# Patient Record
Sex: MALE | Race: WHITE | NOT HISPANIC OR LATINO | Employment: UNEMPLOYED
[De-identification: names, ages, dates, MRNs, and addresses within clinical notes are randomized per-mention and may not be internally consistent; named-entity substitution may affect disease eponyms.]

---

## 2017-06-10 ENCOUNTER — HEALTH MAINTENANCE LETTER (OUTPATIENT)
Age: 22
End: 2017-06-10

## 2017-09-18 ENCOUNTER — OFFICE VISIT (OUTPATIENT)
Dept: BEHAVIORAL HEALTH | Facility: OTHER | Age: 22
End: 2017-09-18
Attending: SOCIAL WORKER
Payer: COMMERCIAL

## 2017-09-18 ENCOUNTER — OFFICE VISIT (OUTPATIENT)
Dept: FAMILY MEDICINE | Facility: OTHER | Age: 22
End: 2017-09-18
Attending: NURSE PRACTITIONER
Payer: COMMERCIAL

## 2017-09-18 VITALS
SYSTOLIC BLOOD PRESSURE: 98 MMHG | BODY MASS INDEX: 18.32 KG/M2 | DIASTOLIC BLOOD PRESSURE: 60 MMHG | RESPIRATION RATE: 16 BRPM | HEART RATE: 71 BPM | WEIGHT: 128 LBS | HEIGHT: 70 IN | OXYGEN SATURATION: 98 % | TEMPERATURE: 98.5 F

## 2017-09-18 DIAGNOSIS — R69 DIAGNOSIS DEFERRED: Primary | ICD-10-CM

## 2017-09-18 DIAGNOSIS — F29 PSYCHOSIS, UNSPECIFIED PSYCHOSIS TYPE (H): Primary | ICD-10-CM

## 2017-09-18 PROCEDURE — 99212 OFFICE O/P EST SF 10 MIN: CPT

## 2017-09-18 PROCEDURE — 99203 OFFICE O/P NEW LOW 30 MIN: CPT | Performed by: NURSE PRACTITIONER

## 2017-09-18 PROCEDURE — 99207 ZZC NO CHARGE BEHAVIORAL WARM HANDOFF: CPT | Performed by: SOCIAL WORKER

## 2017-09-18 ASSESSMENT — PAIN SCALES - GENERAL: PAINLEVEL: NO PAIN (0)

## 2017-09-18 NOTE — PATIENT INSTRUCTIONS
Psychologists/ counselors  Yordy Mccarthy  [phone]  Dr. Nitin Eid [phone]  [facility]  [phone]  [facility] [phone]  Gregg Mejia  [phone]   [employer]  [phone]  (kids)  [employer] [phone]  (teens)  [facility]   [phone]  Counseling  [facility]      [phone]  [facility] [phone]    [facility]     [phone]   [facility] counseling [phone]  [facility]  [phone]  Yoni Sullivan [phone]  Sandra Kinney [phone]  [facility]     [phone]  [facility] behavioral/ adult family     [phone]  [facility] Well[facility]     [phone]  [facility]     [phone]  [place]  Russell Syed  [phone]  [facility]     [phone]  [facility] Dr. HUY Allen     [phone]  [facility] Psychological Services     [phone]

## 2017-09-18 NOTE — PROGRESS NOTES
"  SUBJECTIVE:   Joseph Ayon Jr is a 22 year old male who presents to clinic today for the following health issues:      New Patient/Transfer of Care    Joseph has been out of the area the past couple of years. He was living with his mom for the past few years. He did have a shot every 2 weeks, but cannot remember what it was. He believes he last had the shot a few months ago, but is not certain. He was on commitment in [facility] and [place], but that is completed at this time.  He moved back a couple weeks ago. He was working the the [employer] team of [facility]. He states he has not taking any medications for    referral      Duration: 2 years    Description (location/character/radiation): relates 2 history of mental changes and [facility] to see what happened to him and why    Intensity:  Severe in past, relates doing better now    Accompanying signs and symptoms: unknown    History (similar episodes/previous evaluation): None    Precipitating or alleviating factors: None    Therapies tried and outcome: None  PHQ-9 SCORE 3/13/2014 3/20/2014 4/1/2014   Total Score 14 11 16       States \"feels great\" wants to know what is going on - states he feels like he just won something and cannot under stand how mental illness can just go away.  [facility] mental health treatment - unable to remember when  Denies drug use except marijuana - 1/8 last a day half - but cannot afford it on a regular basis  Denies alcohol use  Unable to sit still in the room  Denies suicidal or homicidal ideation  States he has not heard any voices in his head for 1 to 2 years and does not see things/faces any more         Problem list and histories reviewed & adjusted, as indicated.  Additional history: as documented    Patient Active Problem List   Diagnosis     Psychosis     Past Surgical History:   Procedure Laterality Date     NO HISTORY OF SURGERY         Social History   Substance Use " "Topics     Smoking status: Current Every Day Smoker     Packs/day: 1.00     Years: 9.00     Types: Cigarettes     Smokeless tobacco: Never Used     Alcohol use Yes      Comment: rarely     Family History   Problem Relation Age of Onset     Neurologic Disorder Maternal Grandmother          No current outpatient prescriptions on file.     No Known Allergies      Reviewed and updated as needed this visit by clinical staffTobacco  Allergies  Meds  Med Hx  Surg Hx  Fam Hx  Soc Hx      Reviewed and updated as needed this visit by Provider         ROS:  C: NEGATIVE for fever, chills, change in weight  I: NEGATIVE for worrisome rashes, moles or lesions  E: NEGATIVE for vision changes or irritation  E/M: NEGATIVE for ear, mouth and throat problems  R: NEGATIVE for significant cough or SOB  CV: NEGATIVE for chest pain, palpitations or peripheral edema  GI: NEGATIVE for nausea, abdominal pain, heartburn, or change in bowel habits  : NEGATIVE for frequency, dysuria, or hematuria  M: NEGATIVE for significant arthralgias or myalgia  N: NEGATIVE for weakness, dizziness or paresthesias  E: NEGATIVE for temperature intolerance, skin/hair changes  H: NEGATIVE for bleeding problems  PSYCHIATRIC: HX depression    OBJECTIVE:     BP 98/60 (BP Location: Right arm, Patient Position: Sitting, Cuff Size: Adult Regular)  Pulse 71  Temp 98.5  F (36.9  C) (Tympanic)  Resp 16  Ht 5' 10\" (1.778 m)  Wt 128 lb (58.1 kg)  SpO2 98%  BMI 18.37 kg/m2  Body mass index is 18.37 kg/(m^2).   GENERAL: healthy, alert and no distress  EYES: Eyes grossly normal to inspection, PERRL and conjunctivae and sclerae normal  HENT: ear canals and TM's normal, nose and mouth without ulcers or lesions  NECK: no adenopathy, no asymmetry, masses, or scars and thyroid normal to palpation  RESP: lungs clear to auscultation - no rales, rhonchi or wheezes  CV: regular rate and rhythm, normal S1 S2, no S3 or S4, no murmur, click or rub, no peripheral edema and " peripheral pulses strong  ABDOMEN: soft, nontender, no hepatosplenomegaly, no masses and bowel sounds normal  MS: no gross musculoskeletal defects noted, no edema  SKIN: no suspicious lesions or rashes  NEURO: Normal strength and tone, mentation intact and speech normal  PSYCH: mentation cannot recall the last few years very well - sporadic memories, anxious and unable to sit still in the room   LYMPH: normal ant/post cervical, supraclavicular nodes    Diagnostic Test Results:  none     ASSESSMENT:       PLAN:   ASSESSMENT / PLAN:  (F29) Psychosis, unspecified psychosis type  (primary encounter diagnosis)  Comment: Jackie Wellington from [facility] into see patient. The plan is for Joseph to be involved with [facility] for support and assistance with his mental health. Joseph is referred to [facility] for medication management and treatment. Joseph agreed with the plan and wants the support.   Plan:  MENTAL HEALTH REFERRAL - [facility] & [facility]                Tobacco Cessation:   reports that he has been smoking Cigarettes.  He has a 9.00 pack-year smoking history. He has never used smokeless tobacco.  Tobacco Cessation Action Plan: Information offered: Patient not interested at this time        See Patient Instructions    GORGE Bruno [facility] AMANDA

## 2017-09-18 NOTE — PROGRESS NOTES
BEHAVIORAL HEALTH CLINICIAN introduced and explained integrated health model, brief therapy interventions and referral/ support services for ongoing therapy interventions.  Discussed [facility] services, patient interested and scheduled follow up appointment.  Presenting Issue: disorganization, psychosis, mental health stabilization and connection with community based services.    JASON Campos, [facility], [facility] September 18, 2017

## 2017-09-18 NOTE — MR AVS SNAPSHOT
"              After Visit Summary   9/18/2017    Joseph Ayon Jr    MRN: [id number]           Patient Information     Date Of Birth          1995        Visit Information        Provider Department      9/18/2017 2:00 PM Jackie Wellington LICSW [facility]        Today's Diagnoses     Diagnosis deferred    -  1       Follow-ups after your visit        Your next 10 appointments already scheduled     Sep 19, 2017  1:00 PM CDT   (Arrive by 12:45 PM)   New Visit with DANY Park, WIL Lowery   [facility]    [address] [place] [id number] [phone]              Who to contact     If you have questions or need follow up information about today's clinic visit or your schedule please contact [facility] directly at [phone].  Normal or non-critical lab and imaging results will be communicated to you by MyChart, letter or phone within 4 business days after the clinic has received the results. If you do not hear from us within 7 days, please contact the clinic through MyChart or phone. If you have a critical or abnormal lab result, we will notify you by phone as soon as possible.  Submit refill requests through [employer] or call your pharmacy and they will forward the refill request to us. Please allow 3 business days for your refill to be completed.          Additional Information About Your Visit        MyChart Information     [employer] lets you send messages to your doctor, view your test results, renew your prescriptions, schedule appointments and more. To sign up, go to [email or web address] . Click on \"Log in\" on the left side of the screen, which will take you to the Welcome page. Then click on \"Sign up Now\" on the right side of the page.     You will be asked to enter the access code listed below, as well as some personal information. Please follow the directions to create your username and " password.     Your access code is: [email or web address]  Expires: 2017  3:10 PM     Your access code will  in 90 days. If you need help or a new code, please call your [place] clinic or [phone].        Care EveryWhere ID     This is your Care EveryWhere ID. This could be used by other organizations to access your [place] medical records  [id number]         Blood Pressure from Last 3 Encounters:   17 98/60   14 94/66   14 106/72    Weight from Last 3 Encounters:   17 128 lb (58.1 kg)   14 125 lb (56.7 kg) (10 %)*   14 122 lb (55.3 kg) (7 %)*     * Growth percentiles are based on [facility] 2-20 Years data.              Today, you had the following     No orders found for display         Today's Medication Changes          These changes are accurate as of: 17  3:38 PM.  If you have any questions, ask your nurse or doctor.               Stop taking these medicines if you haven't already. Please contact your care team if you have questions.     traZODone 50 MG tablet   Commonly known as:  DESYREL   Stopped by:  Marlene Obando APRN CNP                    Primary Care Provider    No Pcp Confirmed       No address on file        Equal Access to Services     GALLITO ACUNA AH: Hadii sruthi fourniero Sokhanh, waaxda luqadaha, qaybta kaalmada adeegyada, dwayne castillo . So [facility] [phone].    ATENCIÓN: Si habla español, tiene a little disposición servicios gratuitos de asistencia lingüística. Llame al [phone].    We comply with applicable federal civil rights laws and [place] laws. We do not discriminate on the basis of race, color, national origin, age, disability sex, sexual orientation or gender identity.            Thank you!     Thank you for choosing [facility]  for your care. Our goal is always to provide you with excellent care. Hearing back from our patients is one way we can continue to improve our services. Please take a  few minutes to complete the written survey that you may receive in the mail after your visit with us. Thank you!             Your Updated Medication List - Protect others around you: Learn how to safely use, store and throw away your medicines at www.disposemymeds.org.      Notice  As of 9/18/2017  3:38 PM    You have not been prescribed any medications.

## 2017-09-18 NOTE — MR AVS SNAPSHOT
After Visit Summary   9/18/2017    Joseph Ayon Jr    MRN: [id number]           Patient Information     Date Of Birth          1995        Visit Information        Provider Department      9/18/2017 2:30 PM Marlene Obando APRN CNP [facility]        Today's Diagnoses     Psychosis, unspecified psychosis type    -  1      Care Instructions    Psychologists/ counselors  [facility]  [phone]  Dr. Nitin Eid [phone]  [facility]  [phone]  [facility] [phone]  Gregg Mejia  [phone]   [facility]  [phone]  (kids)  [facility] [phone]  (teens)  [facility]   [phone]  Counseling  [facility]      [phone]  [facility] [phone]    [facility]     [phone]   [facility] counseling [phone]  [facility]  [phone]  Yoni Sullivan [phone]  Sandra Kinney [phone]  [facility]     [phone]  Childrens behavioral/ adult family     [phone]  [facility] Health [facility] Wellness     [phone]  [facility]     [phone]  [place]  Russell Syed  [phone]  [facility]     [phone]  [facility] Dr. HUY Allen     [phone]  [facility] Services     [phone]                        Follow-ups after your visit        Who to contact     If you have questions or need follow up information about today's clinic visit or your schedule please contact [facility] directly at [phone].  Normal or non-critical lab and imaging results will be communicated to you by MyChart, letter or phone within 4 business days after the clinic has received the results. If you do not hear from us within 7 days, please contact the clinic through MyChart or phone. If you have a critical or abnormal lab result, we will notify you by phone  "as soon as possible.  Submit refill requests through [employer] or call your pharmacy and they will forward the refill request to us. Please allow 3 business days for your refill to be completed.          Additional Information About Your Visit        [employer] Information     [employer] lets you send messages to your doctor, view your test results, renew your prescriptions, schedule appointments and more. To sign up, go to [email or web address] . Click on \"Log in\" on the left side of the screen, which will take you to the Welcome page. Then click on \"Sign up Now\" on the right side of the page.     You will be asked to enter the access code listed below, as well as some personal information. Please follow the directions to create your username and password.     Your access code is: [email or web address]  Expires: 2017  3:10 PM     Your access code will  in 90 days. If you need help or a new code, please call your [place] clinic or [phone].        Care EveryWhere ID     This is your Care EveryWhere ID. This could be used by other organizations to access your [place] medical records  [id number]        Your Vitals Were     Pulse Temperature Respirations Height Pulse Oximetry BMI (Body Mass Index)    71 98.5  F (36.9  C) (Tympanic) 16 5' 10\" (1.778 m) 98% 18.37 kg/m2       Blood Pressure from Last 3 Encounters:   17 98/60   14 94/66   14 106/72    Weight from Last 3 Encounters:   17 128 lb (58.1 kg)   14 125 lb (56.7 kg) (10 %)*   14 122 lb (55.3 kg) (7 %)*     * Growth percentiles are based on CDC 2-20 Years data.              Today, you had the following     No orders found for display         Today's Medication Changes          These changes are accurate as of: 17  3:10 PM.  If you have any questions, ask your nurse or doctor.               Stop taking these medicines if you haven't already. Please contact your care team if you have questions.     traZODone 50 MG tablet "   Commonly known as:  DESYREL   Stopped by:  Marlene Obando, GORGE WARD                    Primary Care Provider    No Pcp Confirmed       No address on file        Equal Access to Services     VIDYA ACUNA : Hadii aad ku hadlindahaydee [place], emilie bernadinehasmukh, alie kaamaliada mercy, dwayne purviin hayaaletty gutiérreznara hatfield anna chance. So [facility] [phone].    ATENCIÓN: Si habla español, tiene a little disposición servicios gratuitos de asistencia lingüística. Llame al [phone].    We comply with applicable federal civil rights laws and [place] laws. We do not discriminate on the basis of race, color, national origin, age, disability sex, sexual orientation or gender identity.            Thank you!     Thank you for choosing [facility]  for your care. Our goal is always to provide you with excellent care. Hearing back from our patients is one way we can continue to improve our services. Please take a few minutes to complete the written survey that you may receive in the mail after your visit with us. Thank you!             Your Updated Medication List - Protect others around you: Learn how to safely use, store and throw away your medicines at www.disposemymeds.org.      Notice  As of 9/18/2017  3:10 PM    You have not been prescribed any medications.

## 2017-09-18 NOTE — NURSING NOTE
"Chief Complaint   Patient presents with     Establish Care     Referral     mental health-was doing very well at disc golf and started having mental health issues seeing things, memory loss       Initial BP 98/60 (BP Location: Right arm, Patient Position: Sitting, Cuff Size: Adult Regular)  Pulse 71  Temp 98.5  F (36.9  C) (Tympanic)  Resp 16  Ht 5' 10\" (1.778 m)  Wt 128 lb (58.1 kg)  SpO2 98%  BMI 18.37 kg/m2 Estimated body mass index is 18.37 kg/(m^2) as calculated from the following:    Height as of this encounter: 5' 10\" (1.778 m).    Weight as of this encounter: 128 lb (58.1 kg).  Medication Reconciliation: complete   Chastity Flores      "

## 2017-09-22 ENCOUNTER — OFFICE VISIT (OUTPATIENT)
Dept: BEHAVIORAL HEALTH | Facility: OTHER | Age: 22
End: 2017-09-22
Attending: SOCIAL WORKER
Payer: COMMERCIAL

## 2017-09-22 DIAGNOSIS — R69 DIAGNOSIS DEFERRED: Primary | ICD-10-CM

## 2017-09-22 NOTE — PROGRESS NOTES
Patient came into enroll in [facility], but is already enrolled in [facility] so he is ineligible. This worker did offer other services such as care coordination and therapy. Patient made a therapy appointment for later in the morning, but did not show up. Patient was given one bus pass.

## 2017-09-22 NOTE — MR AVS SNAPSHOT
"              After Visit Summary   2017    Joseph Ayon Jr    MRN: [id number]           Patient Information     Date Of Birth          1995        Visit Information        Provider Department      2017 10:00 AM [facility]        Today's Diagnoses     Diagnosis deferred    -  1       Follow-ups after your visit        Who to contact     If you have questions or need follow up information about today's clinic visit or your schedule please contact [facility] directly at [phone].  Normal or non-critical lab and imaging results will be communicated to you by [employer], letter or phone within 4 business days after the clinic has received the results. If you do not hear from us within 7 days, please contact the clinic through [employer] or phone. If you have a critical or abnormal lab result, we will notify you by phone as soon as possible.  Submit refill requests through [employer] or call your pharmacy and they will forward the refill request to us. Please allow 3 business days for your refill to be completed.          Additional Information About Your Visit        MyChart Information     [employer] lets you send messages to your doctor, view your test results, renew your prescriptions, schedule appointments and more. To sign up, go to [email or web address] . Click on \"Log in\" on the left side of the screen, which will take you to the Welcome page. Then click on \"Sign up Now\" on the right side of the page.     You will be asked to enter the access code listed below, as well as some personal information. Please follow the directions to create your username and password.     Your access code is: [email or web address]  Expires: 2017  3:10 PM     Your access code will  in 90 days. If you need help or a new code, please call your [facility] or [phone].        Care EveryWhere ID     This is your Care EveryWhere ID. This could be used by other " organizations to access your [place] medical records  [id number]         Blood Pressure from Last 3 Encounters:   09/18/17 98/60   04/01/14 94/66   03/20/14 106/72    Weight from Last 3 Encounters:   09/18/17 128 lb (58.1 kg)   04/01/14 125 lb (56.7 kg) (10 %)*   03/20/14 122 lb (55.3 kg) (7 %)*     * Growth percentiles are based on [facility] 2-20 Years data.              Today, you had the following     No orders found for display       Primary Care Provider    None Specified       No primary provider on file.        Equal Access to Services     VIDYA [facility] : Hadenriqueta Toscano, emilie almaraz, alie madrid, dwayne castillo . So [facility] [phone].    ATENCIÓN: Si habla español, tiene a little disposición servicios gratuitos de asistencia lingüística. Llame al [phone].    We comply with applicable federal civil rights laws and [place] laws. We do not discriminate on the basis of race, color, national origin, age, disability sex, sexual orientation or gender identity.            Thank you!     Thank you for choosing [facility]  for your care. Our goal is always to provide you with excellent care. Hearing back from our patients is one way we can continue to improve our services. Please take a few minutes to complete the written survey that you may receive in the mail after your visit with us. Thank you!             Your Updated Medication List - Protect others around you: Learn how to safely use, store and throw away your medicines at www.disposemymeds.org.      Notice  As of 9/22/2017  3:54 PM    You have not been prescribed any medications.

## 2017-11-03 ENCOUNTER — HISTORY (OUTPATIENT)
Dept: EMERGENCY MEDICINE | Facility: OTHER | Age: 22
End: 2017-11-03

## 2017-12-06 ENCOUNTER — TRANSFERRED RECORDS (OUTPATIENT)
Dept: HEALTH INFORMATION MANAGEMENT | Facility: HOSPITAL | Age: 22
End: 2017-12-06

## 2017-12-08 ENCOUNTER — TRANSFERRED RECORDS (OUTPATIENT)
Dept: HEALTH INFORMATION MANAGEMENT | Facility: HOSPITAL | Age: 22
End: 2017-12-08

## 2017-12-13 ENCOUNTER — OFFICE VISIT (OUTPATIENT)
Dept: PSYCHIATRY | Facility: OTHER | Age: 22
End: 2017-12-13
Attending: NURSE PRACTITIONER
Payer: COMMERCIAL

## 2017-12-13 VITALS
SYSTOLIC BLOOD PRESSURE: 118 MMHG | TEMPERATURE: 98.9 F | DIASTOLIC BLOOD PRESSURE: 76 MMHG | OXYGEN SATURATION: 98 % | BODY MASS INDEX: 20.52 KG/M2 | WEIGHT: 143 LBS | HEART RATE: 125 BPM

## 2017-12-13 DIAGNOSIS — F20.0 CHRONIC PARANOID SCHIZOPHRENIA (H): ICD-10-CM

## 2017-12-13 DIAGNOSIS — F20.3 UNDIFFERENTIATED SCHIZOPHRENIA (H): Primary | ICD-10-CM

## 2017-12-13 LAB
BASOPHILS # BLD AUTO: 0.1 10E9/L (ref 0–0.2)
BASOPHILS NFR BLD AUTO: 0.8 %
DIFFERENTIAL METHOD BLD: NORMAL
EOSINOPHIL # BLD AUTO: 0.1 10E9/L (ref 0–0.7)
EOSINOPHIL NFR BLD AUTO: 1.7 %
ERYTHROCYTE [DISTWIDTH] IN BLOOD BY AUTOMATED COUNT: 12.3 % (ref 10–15)
HCT VFR BLD AUTO: 40.2 % (ref 40–53)
HGB BLD-MCNC: 14.3 G/DL (ref 13.3–17.7)
IMM GRANULOCYTES # BLD: 0 10E9/L (ref 0–0.4)
IMM GRANULOCYTES NFR BLD: 0.3 %
LYMPHOCYTES # BLD AUTO: 1.8 10E9/L (ref 0.8–5.3)
LYMPHOCYTES NFR BLD AUTO: 25.3 %
MCH RBC QN AUTO: 29.8 PG (ref 26.5–33)
MCHC RBC AUTO-ENTMCNC: 35.6 G/DL (ref 31.5–36.5)
MCV RBC AUTO: 84 FL (ref 78–100)
MONOCYTES # BLD AUTO: 0.7 10E9/L (ref 0–1.3)
MONOCYTES NFR BLD AUTO: 10.1 %
NEUTROPHILS # BLD AUTO: 4.4 10E9/L (ref 1.6–8.3)
NEUTROPHILS NFR BLD AUTO: 61.8 %
NRBC # BLD AUTO: 0 10*3/UL
NRBC BLD AUTO-RTO: 0 /100
PLATELET # BLD AUTO: 236 10E9/L (ref 150–450)
RBC # BLD AUTO: 4.8 10E12/L (ref 4.4–5.9)
WBC # BLD AUTO: 7.2 10E9/L (ref 4–11)

## 2017-12-13 PROCEDURE — 36415 COLL VENOUS BLD VENIPUNCTURE: CPT | Mod: ZL | Performed by: NURSE PRACTITIONER

## 2017-12-13 PROCEDURE — 85025 COMPLETE CBC W/AUTO DIFF WBC: CPT | Mod: ZL | Performed by: NURSE PRACTITIONER

## 2017-12-13 PROCEDURE — 99213 OFFICE O/P EST LOW 20 MIN: CPT

## 2017-12-13 PROCEDURE — 99213 OFFICE O/P EST LOW 20 MIN: CPT | Performed by: NURSE PRACTITIONER

## 2017-12-13 RX ORDER — CLOZAPINE 100 MG/1
200 TABLET ORAL 2 TIMES DAILY
COMMUNITY
End: 2017-12-18

## 2017-12-13 ASSESSMENT — ANXIETY QUESTIONNAIRES
4. TROUBLE RELAXING: NEARLY EVERY DAY
1. FEELING NERVOUS, ANXIOUS, OR ON EDGE: SEVERAL DAYS
5. BEING SO RESTLESS THAT IT IS HARD TO SIT STILL: MORE THAN HALF THE DAYS
3. WORRYING TOO MUCH ABOUT DIFFERENT THINGS: MORE THAN HALF THE DAYS
IF YOU CHECKED OFF ANY PROBLEMS ON THIS QUESTIONNAIRE, HOW DIFFICULT HAVE THESE PROBLEMS MADE IT FOR YOU TO DO YOUR WORK, TAKE CARE OF THINGS AT HOME, OR GET ALONG WITH OTHER PEOPLE: SOMEWHAT DIFFICULT
7. FEELING AFRAID AS IF SOMETHING AWFUL MIGHT HAPPEN: NEARLY EVERY DAY
2. NOT BEING ABLE TO STOP OR CONTROL WORRYING: MORE THAN HALF THE DAYS
GAD7 TOTAL SCORE: 16
6. BECOMING EASILY ANNOYED OR IRRITABLE: NEARLY EVERY DAY

## 2017-12-13 ASSESSMENT — PATIENT HEALTH QUESTIONNAIRE - PHQ9: SUM OF ALL RESPONSES TO PHQ QUESTIONS 1-9: 6

## 2017-12-13 ASSESSMENT — PAIN SCALES - GENERAL: PAINLEVEL: NO PAIN (0)
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
## 2017-12-13 NOTE — MR AVS SNAPSHOT
"              After Visit Summary   12/13/2017    Joseph Ayon Jr    MRN: [id number]           Patient Information     Date Of Birth          1995        Visit Information        Provider Department      12/13/2017 10:00 AM Clair Luong APRN CNP [facility]        Today's Diagnoses     Undifferentiated schizophrenia (H)    -  1    Chronic paranoid schizophrenia (H)           Follow-ups after your visit        Your next 10 appointments already scheduled     Dec 20, 2017 10:45 AM CST   LAB with HC LAB   [facility]    [address] [facility] [address] [facility] [place] [address]   [phone]            Dec 20, 2017 11:00 AM CST   (Arrive by 10:45 AM)   Return Visit with GORGE Donaldson CNP   [facility]    [address]  [place] [address] [phone]              Who to contact     If you have questions or need follow up information about today's clinic visit or your schedule please contact [facility] directly at [phone].  Normal or non-critical lab and imaging results will be communicated to you by MyChart, letter or phone within 4 business days after the clinic has received the results. If you do not hear from us within 7 days, please contact the clinic through Mamahart or phone. If you have a critical or abnormal lab result, we will notify you by phone as soon as possible.  Submit refill requests through [email or web address] or call your pharmacy and they will forward the refill request to us. Please allow 3 business days for your refill to be completed.          Additional Information About Your Visit        MyChart Information     [email or web address] lets you send messages to your doctor, view your test results, renew your prescriptions, schedule appointments and more. To sign up, go to [email or web address] . Click on \"Log in\" on the left side of the screen, which will take you to the Welcome " "page. Then click on \"Sign up Now\" on the right side of the page.     You will be asked to enter the access code listed below, as well as some personal information. Please follow the directions to create your username and password.     Your access code is: [email or web address]  Expires: 2017  2:10 PM     Your access code will  in 90 days. If you need help or a new code, please call your [place] clinic or [phone].        Care EveryWhere ID     This is your Care EveryWhere ID. This could be used by other organizations to access your [place] medical records  [id number]        Your Vitals Were     Pulse Temperature Pulse Oximetry BMI (Body Mass Index)          125 98.9  F (37.2  C) (Tympanic) 98% 20.52 kg/m2         Blood Pressure from Last 3 Encounters:   17 118/76   17 98/60   14 94/66    Weight from Last 3 Encounters:   17 143 lb (64.9 kg)   17 128 lb (58.1 kg)   14 125 lb (56.7 kg) (10 %)*     * Growth percentiles are based on CDC 2-20 Years data.              We Performed the Following     CBC with platelets and differential     Clozapine Level ([facility])     CRP cardiac risk     Troponin I        Primary Care Provider Fax #    Physician No Ref-Primary [phone]       No address on file        Equal Access to Services     GALLITO ACUNA : Hadii sruthi fourniero Sokhanh, waaxda luqadaha, qaybta kaalmada aderubi, dwayne castillo . So [facility] [phone].    ATENCIÓN: Si habla español, tiene a little disposición servicios gratuitos de asistencia lingüística. Llame al [phone].    We comply with applicable federal civil rights laws and [place] laws. We do not discriminate on the basis of race, color, national origin, age, disability, sex, sexual orientation, or gender identity.            Thank you!     [facility] you [facility]  for your [facility]. Our goal is always to provide you with excellent care. Hearing back from our " patients is one way we can continue to improve our services. Please take a few minutes to complete the written survey that you may receive in the mail after your visit with us. Thank you!             Your Updated Medication List - Protect others around you: Learn how to safely use, store and throw away your medicines at www.disposemymeds.org.          This list is accurate as of: 12/13/17 11:07 AM.  Always use your most recent med list.                   Brand Name Dispense Instructions for use Diagnosis    CLOZARIL 100 MG tablet   Generic drug:  cloZAPine      Take 200 mg by mouth 2 times daily

## 2017-12-13 NOTE — NURSING NOTE
"Chief Complaint   Patient presents with     Mental Health Problem     patient recently got discharged for baxtor,  currently on clozaril- medication helping symptoms        Initial /76 (BP Location: Left arm, Patient Position: Chair, Cuff Size: Adult Regular)  Pulse 125  Temp 98.9  F (37.2  C) (Tympanic)  Wt 143 lb (64.9 kg)  SpO2 98%  BMI 20.52 kg/m2 Estimated body mass index is 20.52 kg/(m^2) as calculated from the following:    Height as of 9/18/17: 5' 10\" (1.778 m).    Weight as of this encounter: 143 lb (64.9 kg).  Medication Reconciliation: complete     PEPE VÁZQUEZ      "

## 2017-12-13 NOTE — PROGRESS NOTES
Wrote order for standing weekly CBC w/diff and platelets and weekly Clozapine level, with the request that result be faxed to pharmacy and to current prescriber:     Added to order:   [facility] [phone]  AND  Please FAX results to [facility] at [phone]  e faxed to

## 2017-12-14 ASSESSMENT — ANXIETY QUESTIONNAIRES: GAD7 TOTAL SCORE: 16

## 2017-12-18 ENCOUNTER — OFFICE VISIT (OUTPATIENT)
Dept: PSYCHIATRY | Facility: OTHER | Age: 22
End: 2017-12-18
Attending: NURSE PRACTITIONER
Payer: COMMERCIAL

## 2017-12-18 VITALS
BODY MASS INDEX: 21.47 KG/M2 | SYSTOLIC BLOOD PRESSURE: 130 MMHG | TEMPERATURE: 97.9 F | OXYGEN SATURATION: 99 % | HEIGHT: 70 IN | HEART RATE: 104 BPM | WEIGHT: 150 LBS | DIASTOLIC BLOOD PRESSURE: 80 MMHG

## 2017-12-18 DIAGNOSIS — F25.0 SCHIZOAFFECTIVE DISORDER, BIPOLAR TYPE (H): Primary | ICD-10-CM

## 2017-12-18 DIAGNOSIS — F20.0 CHRONIC PARANOID SCHIZOPHRENIA (H): ICD-10-CM

## 2017-12-18 DIAGNOSIS — Z76.0 ENCOUNTER FOR MEDICATION REFILL: ICD-10-CM

## 2017-12-18 DIAGNOSIS — F20.0 CHRONIC PARANOID SCHIZOPHRENIA (H): Primary | ICD-10-CM

## 2017-12-18 LAB
BASOPHILS # BLD AUTO: 0.1 10E9/L (ref 0–0.2)
BASOPHILS NFR BLD AUTO: 0.8 %
DIFFERENTIAL METHOD BLD: NORMAL
EOSINOPHIL # BLD AUTO: 0.1 10E9/L (ref 0–0.7)
EOSINOPHIL NFR BLD AUTO: 2 %
ERYTHROCYTE [DISTWIDTH] IN BLOOD BY AUTOMATED COUNT: 12.7 % (ref 10–15)
HCT VFR BLD AUTO: 40 % (ref 40–53)
HGB BLD-MCNC: 13.7 G/DL (ref 13.3–17.7)
IMM GRANULOCYTES # BLD: 0 10E9/L (ref 0–0.4)
IMM GRANULOCYTES NFR BLD: 0.3 %
LYMPHOCYTES # BLD AUTO: 1.7 10E9/L (ref 0.8–5.3)
LYMPHOCYTES NFR BLD AUTO: 27.1 %
MCH RBC QN AUTO: 29.6 PG (ref 26.5–33)
MCHC RBC AUTO-ENTMCNC: 34.3 G/DL (ref 31.5–36.5)
MCV RBC AUTO: 86 FL (ref 78–100)
MONOCYTES # BLD AUTO: 0.5 10E9/L (ref 0–1.3)
MONOCYTES NFR BLD AUTO: 8.5 %
NEUTROPHILS # BLD AUTO: 3.8 10E9/L (ref 1.6–8.3)
NEUTROPHILS NFR BLD AUTO: 61.3 %
NRBC # BLD AUTO: 0 10*3/UL
NRBC BLD AUTO-RTO: 0 /100
PLATELET # BLD AUTO: 243 10E9/L (ref 150–450)
RBC # BLD AUTO: 4.63 10E12/L (ref 4.4–5.9)
TROPONIN I SERPL-MCNC: <0.015 UG/L (ref 0–0.04)
WBC # BLD AUTO: 6.1 10E9/L (ref 4–11)

## 2017-12-18 PROCEDURE — 85025 COMPLETE CBC W/AUTO DIFF WBC: CPT | Mod: ZL | Performed by: NURSE PRACTITIONER

## 2017-12-18 PROCEDURE — 36415 COLL VENOUS BLD VENIPUNCTURE: CPT | Mod: ZL | Performed by: NURSE PRACTITIONER

## 2017-12-18 PROCEDURE — 86141 C-REACTIVE PROTEIN HS: CPT | Mod: ZL | Performed by: NURSE PRACTITIONER

## 2017-12-18 PROCEDURE — 99000 SPECIMEN HANDLING OFFICE-LAB: CPT | Performed by: NURSE PRACTITIONER

## 2017-12-18 PROCEDURE — 99212 OFFICE O/P EST SF 10 MIN: CPT

## 2017-12-18 PROCEDURE — 99214 OFFICE O/P EST MOD 30 MIN: CPT | Mod: 25 | Performed by: NURSE PRACTITIONER

## 2017-12-18 PROCEDURE — 80159 DRUG ASSAY CLOZAPINE: CPT | Mod: ZL | Performed by: NURSE PRACTITIONER

## 2017-12-18 PROCEDURE — 84484 ASSAY OF TROPONIN QUANT: CPT | Mod: ZL,59 | Performed by: NURSE PRACTITIONER

## 2017-12-18 RX ORDER — CLOZAPINE 200 MG/1
200 TABLET ORAL 2 TIMES DAILY
Qty: 14 TABLET | Refills: 0 | Status: SHIPPED | OUTPATIENT
Start: 2017-12-18 | End: 2017-12-29

## 2017-12-18 ASSESSMENT — ANXIETY QUESTIONNAIRES
6. BECOMING EASILY ANNOYED OR IRRITABLE: NEARLY EVERY DAY
3. WORRYING TOO MUCH ABOUT DIFFERENT THINGS: NEARLY EVERY DAY
4. TROUBLE RELAXING: NEARLY EVERY DAY
5. BEING SO RESTLESS THAT IT IS HARD TO SIT STILL: MORE THAN HALF THE DAYS
7. FEELING AFRAID AS IF SOMETHING AWFUL MIGHT HAPPEN: NEARLY EVERY DAY
IF YOU CHECKED OFF ANY PROBLEMS ON THIS QUESTIONNAIRE, HOW DIFFICULT HAVE THESE PROBLEMS MADE IT FOR YOU TO DO YOUR WORK, TAKE CARE OF THINGS AT HOME, OR GET ALONG WITH OTHER PEOPLE: VERY DIFFICULT
1. FEELING NERVOUS, ANXIOUS, OR ON EDGE: NEARLY EVERY DAY
GAD7 TOTAL SCORE: 20
2. NOT BEING ABLE TO STOP OR CONTROL WORRYING: NEARLY EVERY DAY

## 2017-12-18 ASSESSMENT — PATIENT HEALTH QUESTIONNAIRE - PHQ9: SUM OF ALL RESPONSES TO PHQ QUESTIONS 1-9: 22

## 2017-12-18 ASSESSMENT — PAIN SCALES - GENERAL: PAINLEVEL: NO PAIN (0)

## 2017-12-18 NOTE — MR AVS SNAPSHOT
"              After Visit Summary   12/18/2017    Joseph Ayon Jr    MRN: [id number]           Patient Information     Date Of Birth          1995        Visit Information        Provider Department      12/18/2017 11:00 AM Clair Luong APRN CNP [facility]        Today's Diagnoses     Schizoaffective disorder, bipolar type (H)    -  1    Encounter for medication refill           Follow-ups after your visit        Your next 10 appointments already scheduled     Dec 20, 2017 10:45 AM CST   LAB with HC LAB   [facility] ([facility] )    [address]  [place] [address]   [phone]              Who to contact     If you have questions or need follow up information about today's clinic visit or your schedule please contact [facility] directly at [phone].  Normal or non-critical lab and imaging results will be communicated to you by MyChart, letter or phone within 4 business days after the clinic has received the results. If you do not hear from us within 7 days, please contact the clinic through MyChart or phone. If you have a critical or abnormal lab result, we will notify you by phone as soon as possible.  Submit refill requests through Service Seeking or call your pharmacy and they will forward the refill request to us. Please allow 3 business days for your refill to be completed.          Additional Information About Your Visit        MyChart Information     Service Seeking lets you send messages to your doctor, view your test results, renew your prescriptions, schedule appointments and more. To sign up, go to [email or web address] . Click on \"Log in\" on the left side of the screen, which will take you to the Welcome page. Then click on \"Sign up Now\" on the right side of the page.     You will be asked to enter the access code listed below, as well as some personal information. Please follow the directions to create your username and " "password.     Your access code is: [email or web address]  Expires: 3/18/2018 11:27 AM     Your access code will  in 90 days. If you need help or a new code, please call your [place] clinic or [phone].        Care EveryWhere ID     This is your Care EveryWhere ID. This could be used by other organizations to access your [place] medical records  [id number]        Your Vitals Were     Pulse Temperature Height Pulse Oximetry BMI (Body Mass Index)       104 97.9  F (36.6  C) (Tympanic) 5' 10\" (1.778 m) 99% 21.52 kg/m2        Blood Pressure from Last 3 Encounters:   17 130/80   17 118/76   17 98/60    Weight from Last 3 Encounters:   17 150 lb (68 kg)   17 143 lb (64.9 kg)   17 128 lb (58.1 kg)              We Performed the Following     EKG 12-lead complete w/read - (Clinic Performed)        Primary Care Provider Fax #    Physician No Ref-Primary [phone]       No address on file        Equal Access to Services     GALLITO ACUNA : Hadii sruthi Toscano, waaxda luandreiadaha, qaybta kaalmada adenarayarc, dwayne castillo . So [facility] [phone].    ATENCIÓN: Si habla español, tiene a little disposición servicios gratuitos de asistencia lingüística. Llame al [phone].    We comply with applicable federal civil rights laws and [place] laws. We do not discriminate on the basis of race, color, national origin, age, disability, sex, sexual orientation, or gender identity.            Thank you!     Thank you for choosing [facility]  for your care. Our goal is always to provide you with excellent care. Hearing back from our patients is one way we can continue to improve our services. Please take a few minutes to complete the written survey that you may receive in the mail after your visit with us. Thank you!             Your Updated Medication List - Protect others around you: Learn how to safely use, store and throw away your medicines at " www.disposemymeds.org.          This list is accurate as of: 12/18/17 11:27 AM.  Always use your most recent med list.                   Brand Name Dispense Instructions for use Diagnosis    CLOZARIL 100 MG tablet   Generic drug:  cloZAPine      Take 200 mg by mouth 2 times daily

## 2017-12-18 NOTE — TELEPHONE ENCOUNTER
Joseph works with Renetta Luong here in clinic. He is taking clozapine 200 mg twice daily and was in psychiatric hospital at [place]. I reviewed discharge summary and confirmed clozapine is 200 mg twice daily. Renetta not registered yet in clozapineREMS so I will fill Joseph's meds. I reviewed his chart.  Most recent CBC with diff is already recorded in the clozapine registry. I will send clozapine 1-week supply thus 14 tabs over to [facility]. Most recent ANC is 5310 and I see in discharge summary they have bmp, BMI, lipid panel from 12/2017 as well.

## 2017-12-18 NOTE — PROGRESS NOTES
"PSYCHIATRY CLINIC PROGRESS NOTE   20 minute medication management, more than 50% of time spent counseling patient on medications, medication side effects, symptom history and management   SUBJECTIVE / INTERIM HISTORY                                                                       Joseph presents alone in a heavy jacket that he keeps on. His facial tic has resolved at least at this moment, at this time not showing at all. His speech is calm and quiet, and slightly slurred from his edentulous state.    Joseph has just gone to the lab and is monosyllabic today. In response to how the voices are says he doesn't know.  He denies smoking cigarettes, today, but has a very strong cigarette scent. We discuss how cigarettes may lower the level of his medications as an interaction.   He did not know that. He responds appropriately.   Joseph does not feel like hurting anyone today.  He does not feel like hurting himself.   Joseph will talk about his children, he has twins who are two years old and live in the [facility], he is trying to see whether he can leave [facility] to live closer to them.   He sleeps okay he says. \"Depends\"  He very much does not want to answer questions today, but does agree to an EKG for his Clozaril.   SUBSTANCE USE- no ETOH, no meth, does smoke MJ, daily.      SYMPTOMS- Somewhat depressed mood, low energy. No SI.   MEDICAL ROS- No N/V/D  MEDICAL / SURGICAL HISTORY                pregnant [if applicable]--no   Medical Team:     PMD-     Therapist-       Patient Active Problem List   Diagnosis     Schizoaffective disorder     Anxiety                           ALLERGY   Review of patient's allergies indicates  No Known Allergies    MEDICATIONS                                                                                                   Current Outpatient Prescriptions   Medication Sig     Current Outpatient Prescriptions   Medication Sig Dispense Refill     cloZAPine (CLOZARIL) 100 MG " "tablet Take 200 mg by mouth 2 times daily                                                    No current facility-administered medications for this visit.          VITALS    /80 (BP Location: Right arm, Patient Position: Chair, Cuff Size: Adult Regular)  Pulse 104  Temp 97.9  F (36.6  C) (Tympanic)  Ht 5' 10\" (1.778 m)  Wt 150 lb (68 kg)  SpO2 99%  BMI 21.52 kg/m2    PHQ9                        PHQ-9 SCORE date date date   Total Score - - -   Total Score 1 1          MENTAL STATUS EXAM                                                                                        Alert. Oriented to person, place, and date / time. Casually groomed, calm, cooperative with minimal eye contact. No problems with speech except those naturally occurring from his edentulous state,  No remarkable psychomotor behavior. Mood was flat and affect with very restricted range. Thought process, including associations, was unremarkable and thought content was devoid of suicidal and homicidal ideation but evidences some psychotic thought. Audio hallucinations still present. About which patient refuses to talk.. Insight was poor. Judgment was intact and adequate for safety. Fund of knowledge was intact. Pt demonstrates no obvious problems with attention, concentration, language, recent or remote memory although these were not formally tested.      ASSESSMENT                                                                                                       HISTORICAL:  follow-up psych consult       CURRENT: This patient provides a history which supports the diagnoses Schizoaffective Disorder, depressed mood and anxiety with psychotic features, notably AH. He denies commands from his AH today. He has requested an increase in his Clozaril but understands       TREATMENT RISK STATEMENT: The risks, benefits, alternatives and potential adverse effects have been explained and are understood by the pt. The pt agrees to the treatment plan " with the ability to do so. The pt knows to call the clinic for any problems or access emergency care if needed.    DIAGNOSES                Schizoaffective Disorder . Bipolar type (F25.0)        LABS      Results for orders placed or performed in visit on 12/18/17   CBC with platelets and differential   Result Value Ref Range    WBC 6.1 4.0 - 11.0 10e9/L    RBC Count 4.63 4.4 - 5.9 10e12/L    Hemoglobin 13.7 13.3 - 17.7 g/dL    Hematocrit 40.0 40.0 - 53.0 %    MCV 86 78 - 100 fl    MCH 29.6 26.5 - 33.0 pg    MCHC 34.3 31.5 - 36.5 g/dL    RDW 12.7 10.0 - 15.0 %    Platelet Count 243 150 - 450 10e9/L    Diff Method Automated Method     % Neutrophils 61.3 %    % Lymphocytes 27.1 %    % Monocytes 8.5 %    % Eosinophils 2.0 %    % Basophils 0.8 %    % Immature Granulocytes 0.3 %    Nucleated RBCs 0 0 /100    Absolute Neutrophil 3.8 1.6 - 8.3 10e9/L    Absolute Lymphocytes 1.7 0.8 - 5.3 10e9/L    Absolute Monocytes 0.5 0.0 - 1.3 10e9/L    Absolute Eosinophils 0.1 0.0 - 0.7 10e9/L    Absolute Basophils 0.1 0.0 - 0.2 10e9/L    Abs Immature Granulocytes 0.0 0 - 0.4 10e9/L    Absolute Nucleated RBC 0.0           PLAN                                                                                                                          1) MEDICATIONS:  Clozaril  200mg PO X 2 daily     2) THERAPY: No Change. Patient not interested at this time.   3) LABS:  Clozaril level and WBC done on 12/15/2017, Troponin and CRP being done today     4) PT MONITOR [call for probs]: Worsening symptoms, side effects from medications, SI/HI     5) REFERRALS [CD tx, medical, tests other]: EKG     6)  RTC: 3 weeks

## 2017-12-18 NOTE — NURSING NOTE
"No chief complaint on file.      Initial /80 (BP Location: Right arm, Patient Position: Chair, Cuff Size: Adult Regular)  Pulse 104  Temp 97.9  F (36.6  C) (Tympanic)  Ht 5' 10\" (1.778 m)  Wt 150 lb (68 kg)  SpO2 99%  BMI 21.52 kg/m2 Estimated body mass index is 21.52 kg/(m^2) as calculated from the following:    Height as of this encounter: 5' 10\" (1.778 m).    Weight as of this encounter: 150 lb (68 kg).  Medication Reconciliation: complete     Crystal De La Cruz   "

## 2017-12-19 LAB — CRP SERPL HS-MCNC: 0.3 MG/L

## 2017-12-19 ASSESSMENT — ANXIETY QUESTIONNAIRES: GAD7 TOTAL SCORE: 20

## 2017-12-21 LAB
CLOZAPINE AND METABOLITES TOTAL: 870 NG/ML
CLOZAPINE SERPL-MCNC: 485 NG/ML
CLOZAPINE-N-OXIDE QUANT: 151 NG/ML
NORCLOZAPINE SERPL-MCNC: 234 NG/ML

## 2017-12-26 DIAGNOSIS — F20.0 CHRONIC PARANOID SCHIZOPHRENIA (H): ICD-10-CM

## 2017-12-26 LAB
BASOPHILS # BLD AUTO: 0 10E9/L (ref 0–0.2)
BASOPHILS NFR BLD AUTO: 0.2 %
CRP SERPL-MCNC: 9.9 MG/L (ref 0–8)
DIFFERENTIAL METHOD BLD: NORMAL
EOSINOPHIL # BLD AUTO: 0.2 10E9/L (ref 0–0.7)
EOSINOPHIL NFR BLD AUTO: 1.9 %
ERYTHROCYTE [DISTWIDTH] IN BLOOD BY AUTOMATED COUNT: 12.6 % (ref 10–15)
HCT VFR BLD AUTO: 43.4 % (ref 40–53)
HGB BLD-MCNC: 14.8 G/DL (ref 13.3–17.7)
LYMPHOCYTES # BLD AUTO: 2.4 10E9/L (ref 0.8–5.3)
LYMPHOCYTES NFR BLD AUTO: 28 %
MCH RBC QN AUTO: 29.8 PG (ref 26.5–33)
MCHC RBC AUTO-ENTMCNC: 34.1 G/DL (ref 31.5–36.5)
MCV RBC AUTO: 87 FL (ref 78–100)
MONOCYTES # BLD AUTO: 0.9 10E9/L (ref 0–1.3)
MONOCYTES NFR BLD AUTO: 10.8 %
NEUTROPHILS # BLD AUTO: 5 10E9/L (ref 1.6–8.3)
NEUTROPHILS NFR BLD AUTO: 59.1 %
PLATELET # BLD AUTO: 289 10E9/L (ref 150–450)
RBC # BLD AUTO: 4.97 10E12/L (ref 4.4–5.9)
TROPONIN I SERPL-MCNC: <0.015 UG/L (ref 0–0.04)
WBC # BLD AUTO: 8.5 10E9/L (ref 4–11)

## 2017-12-26 PROCEDURE — 84484 ASSAY OF TROPONIN QUANT: CPT | Performed by: PHYSICIAN ASSISTANT

## 2017-12-26 PROCEDURE — 36415 COLL VENOUS BLD VENIPUNCTURE: CPT | Performed by: PHYSICIAN ASSISTANT

## 2017-12-26 PROCEDURE — 86140 C-REACTIVE PROTEIN: CPT | Performed by: PHYSICIAN ASSISTANT

## 2017-12-26 PROCEDURE — 80159 DRUG ASSAY CLOZAPINE: CPT | Mod: 90 | Performed by: PHYSICIAN ASSISTANT

## 2017-12-26 PROCEDURE — 99000 SPECIMEN HANDLING OFFICE-LAB: CPT | Performed by: PHYSICIAN ASSISTANT

## 2017-12-26 PROCEDURE — 85025 COMPLETE CBC W/AUTO DIFF WBC: CPT | Performed by: PHYSICIAN ASSISTANT

## 2017-12-28 LAB
CLOZAPINE AND METABOLITES TOTAL: NOT DETECTED NG/ML
CLOZAPINE SERPL-MCNC: NOT DETECTED NG/ML
CLOZAPINE-N-OXIDE QUANT: NOT DETECTED NG/ML
NORCLOZAPINE SERPL-MCNC: NOT DETECTED NG/ML

## 2017-12-29 ENCOUNTER — OFFICE VISIT (OUTPATIENT)
Dept: FAMILY MEDICINE | Facility: CLINIC | Age: 22
End: 2017-12-29
Payer: COMMERCIAL

## 2017-12-29 VITALS
SYSTOLIC BLOOD PRESSURE: 136 MMHG | DIASTOLIC BLOOD PRESSURE: 85 MMHG | BODY MASS INDEX: 20.04 KG/M2 | WEIGHT: 140 LBS | HEART RATE: 97 BPM | HEIGHT: 70 IN

## 2017-12-29 DIAGNOSIS — F31.12 BIPOLAR 1 DISORDER, MANIC, MODERATE (H): Primary | ICD-10-CM

## 2017-12-29 DIAGNOSIS — F29 PSYCHOSIS, UNSPECIFIED PSYCHOSIS TYPE (H): ICD-10-CM

## 2017-12-29 PROCEDURE — 99214 OFFICE O/P EST MOD 30 MIN: CPT | Performed by: PHYSICIAN ASSISTANT

## 2017-12-29 RX ORDER — CLOZAPINE 200 MG/1
200 TABLET ORAL 2 TIMES DAILY
Qty: 30 TABLET | Refills: 1 | Status: SHIPPED | OUTPATIENT
Start: 2017-12-29 | End: 2018-01-19

## 2017-12-29 RX ORDER — LAMOTRIGINE 25 MG/1
TABLET ORAL
Qty: 30 TABLET | Refills: 1 | Status: ON HOLD | OUTPATIENT
Start: 2017-12-29 | End: 2018-01-03

## 2017-12-29 NOTE — MR AVS SNAPSHOT
After Visit Summary   12/29/2017    Joseph Ayon Jr    MRN: [id number]           Patient Information     Date Of Birth          1995        Visit Information        Provider Department      12/29/2017 4:40 PM Jacky Agustin PA-C [facility]        Today's Diagnoses     Bipolar 1 disorder, manic, moderate (H)    -  1    Psychosis, unspecified psychosis type           Follow-ups after your visit        Additional Services     MENTAL HEALTH REFERRAL  - Adult; Psychiatry and Medication Management; Psychiatry; [facility] [place]: Collaborative Care Psychiatry Service   [place], [place] [phone]  Medication management & future refills will be returned to [facility] PCP upon compl...       All scheduling is subject to the client's specific insurance plan & benefits, provider/location availability, and provider clinical specialities.  Please arrive 15 minutes early for your first appointment and bring your completed paperwork.    Please be aware that coverage of these services is subject to the terms and limitations of your health insurance plan.  Call member services at your health plan with any benefit or coverage questions.                            Who to contact     Normal or non-critical lab and imaging results will be communicated to you by MyChart, letter or phone within 4 business days after the clinic has received the results. If you do not hear from us within 7 days, please contact the clinic through [employer] or phone. If you have a critical or abnormal lab result, we will notify you by phone as soon as possible.  Submit refill requests through [employer] or call your pharmacy and they will forward the refill request to us. Please allow 3 business days for your refill to be completed.          If you need to speak with a  for additional information , please call: [phone]             Additional Information About Your Visit        MyChart Information     [employer]  "lets you send messages to your doctor, view your test results, renew your prescriptions, schedule appointments and more. To sign up, go to [email or web address] . Click on \"Log in\" on the left side of the screen, which will take you to the Welcome page. Then click on \"Sign up Now\" on the right side of the page.     You will be asked to enter the access code listed below, as well as some personal information. Please follow the directions to create your username and password.     Your access code is: [email or web address]  Expires: 3/18/2018 11:27 AM     Your access code will  in 90 days. If you need help or a new code, please call your [facility] clinic or [phone].        Care EveryWhere ID     This is your Care EveryWhere ID. This could be used by other organizations to access your [facility] medical records  [id number]        Your Vitals Were     Pulse Height BMI (Body Mass Index)             97 5' 10\" (1.778 m) 20.09 kg/m2          Blood Pressure from Last 3 Encounters:   17 136/85   17 130/80   17 118/76    Weight from Last 3 Encounters:   17 140 lb (63.5 kg)   17 150 lb (68 kg)   17 143 lb (64.9 kg)              We Performed the Following     MENTAL HEALTH REFERRAL  - Adult; Psychiatry and Medication Management; Psychiatry; [facility]: [facility]iatry Service   [place], [place] [phone]  Medication management & future refills will be returned to [facility] PCP upon compl...          Today's Medication Changes          These changes are accurate as of: 17  4:59 PM.  If you have any questions, ask your nurse or doctor.               Start taking these medicines.        Dose/Directions    lamoTRIgine 25 MG tablet   Commonly known as:  LaMICtal   Used for:  Bipolar 1 disorder, manic, moderate (H)   Started by:  Jacky Agustin PA-C        Take 1 tab daily for 2 weeks, then increase to 2 tabs daily thereafter   Quantity:  30 tablet   Refills:  1          "   Where to get your medicines      These medications were sent to [facility] [place] - [address] [facility] [address] [facility] [address] [facility]  [address] [address] [address]     Phone:  [phone]     lamoTRIgine 25 MG tablet         Some of these will need a paper prescription and others can be bought over the counter.  Ask your nurse if you have questions.     Bring a paper prescription for each of these medications     Clozapine 200 MG tablet                Primary Care Provider Fax #    Physician No Ref-Primary [phone]       No address on file        Equal Access to Services     VIDYA ACUNA : Hadii sruthi ku hadasho Soomaali, waaxda luqadaha, qaybta kaalmada adeegyada, dwayne castillo . So [facility] [phone].    ATENCIÓN: Si habla español, tiene a little disposición servicios gratuitos de asistencia lingüística. Drakeame al [phone].    We comply with applicable federal civil rights laws and [place] laws. We do not discriminate on the basis of race, color, national origin, age, disability, sex, sexual orientation, or gender identity.            Thank you!     Thank you for choosing [facility]  for your care. Our goal is always to provide you with excellent care. Hearing back from our patients is one way we can continue to improve our services. Please take a few minutes to complete the written survey that you may receive in the mail after your visit with us. Thank you!             Your Updated Medication List - Protect others around you: Learn how to safely use, store and throw away your medicines at www.disposemymeds.org.          This list is accurate as of: 12/29/17  4:59 PM.  Always use your most recent med list.                   Brand Name Dispense Instructions for use Diagnosis    Clozapine 200 MG tablet    CLOZARIL    30 tablet    Take 1 tablet (200 mg) by mouth 2 times daily    Psychosis, unspecified psychosis type       lamoTRIgine 25 MG tablet    LaMICtal    30  tablet    Take 1 tab daily for 2 weeks, then increase to 2 tabs daily thereafter    Bipolar 1 disorder, manic, moderate (H)

## 2017-12-29 NOTE — PROGRESS NOTES
SUBJECTIVE:   Joseph Ayon Jr is a 22 year old male who presents to clinic today for the following health issues:      Mental Health Problem-needing mental health referral today for future medication and weekly blood draws at [facility]. Referral placed.    History of bipolar disorder with psychotic features.   Currently clozapine is helping a lot.   Living with his mother [facility] currently.   Still hearing some voices. Some delusions re: grandiosity. No depression currently. Reports that he doesn't lose energy. He reports not being suicidal, in fact he feels the best he ever has. He reports not sleeping and not even felling tired.   Problem list and histories reviewed & adjusted, as indicated.  Additional history: as documented        Reviewed and updated as needed this visit by clinical staff  Tobacco       Reviewed and updated as needed this visit by Provider         All other systems negative except as outline above  OBJECTIVE:  Eye exam - right eye normal lid, conjunctiva, cornea, pupil and fundus, left eye normal lid, conjunctiva, cornea, pupil and fundus.  Thyroid not palpable, not enlarged, no nodules detected.  CHEST:chest clear to IPPA, no tachypnea, retractions or cyanosis and S1, S2 normal, no murmur, no gallop, rate regular.    Joseph was seen today for mental health problem.    Diagnoses and all orders for this visit:    Bipolar 1 disorder, manic, moderate (H)  -     MENTAL HEALTH REFERRAL  - Adult; Psychiatry and Medication Management; Psychiatry; [facility]: [facility]iatry Service - [place], [place] [phone]  Medication management & future refills will be returned to G PCP upon compl...  -     lamoTRIgine (LAMICTAL) 25 MG tablet; Take 1 tab daily for 2 weeks, then increase to 2 tabs daily thereafter    Psychosis, unspecified psychosis type  -     MENTAL HEALTH REFERRAL  - Adult; Psychiatry and Medication Management; Psychiatry; [facility]: Collaborative Care  Psychiatry Service - [place], [place] [phone]  Medication management & future refills will be returned to [facility] PCP upon compl...  -     Clozapine (CLOZARIL) 200 MG tablet; Take 1 tablet (200 mg) by mouth 2 times daily      If can't get in with psych , i'd like him to see me in 2 wks for a recheck.

## 2018-01-02 ENCOUNTER — TELEPHONE (OUTPATIENT)
Dept: FAMILY MEDICINE | Facility: CLINIC | Age: 23
End: 2018-01-02

## 2018-01-02 ENCOUNTER — HOSPITAL ENCOUNTER (EMERGENCY)
Facility: CLINIC | Age: 23
Discharge: PSYCHIATRIC OR SUBSTANCE USE FACILITY | End: 2018-01-03
Attending: EMERGENCY MEDICINE | Admitting: EMERGENCY MEDICINE
Payer: COMMERCIAL

## 2018-01-02 VITALS
BODY MASS INDEX: 20.09 KG/M2 | RESPIRATION RATE: 16 BRPM | TEMPERATURE: 98.5 F | OXYGEN SATURATION: 98 % | WEIGHT: 140 LBS | HEART RATE: 105 BPM

## 2018-01-02 DIAGNOSIS — R44.0 AUDITORY HALLUCINATIONS: ICD-10-CM

## 2018-01-02 DIAGNOSIS — R41.89 DISORGANIZED THINKING: ICD-10-CM

## 2018-01-02 DIAGNOSIS — F30.9 MANIA (H): ICD-10-CM

## 2018-01-02 LAB
AMPHETAMINES UR QL SCN: NEGATIVE
BARBITURATES UR QL: NEGATIVE
BENZODIAZ UR QL: NEGATIVE
CANNABINOIDS UR QL SCN: NEGATIVE
COCAINE UR QL: NEGATIVE
OPIATES UR QL SCN: NEGATIVE
PCP UR QL SCN: NEGATIVE

## 2018-01-02 PROCEDURE — 90791 PSYCH DIAGNOSTIC EVALUATION: CPT

## 2018-01-02 PROCEDURE — 99285 EMERGENCY DEPT VISIT HI MDM: CPT | Mod: Z6 | Performed by: EMERGENCY MEDICINE

## 2018-01-02 PROCEDURE — 25000132 ZZH RX MED GY IP 250 OP 250 PS 637: Performed by: EMERGENCY MEDICINE

## 2018-01-02 PROCEDURE — 80307 DRUG TEST PRSMV CHEM ANLYZR: CPT | Performed by: EMERGENCY MEDICINE

## 2018-01-02 PROCEDURE — 99285 EMERGENCY DEPT VISIT HI MDM: CPT | Mod: 25 | Performed by: EMERGENCY MEDICINE

## 2018-01-02 RX ORDER — CLOZAPINE 100 MG/1
200 TABLET ORAL ONCE
Status: COMPLETED | OUTPATIENT
Start: 2018-01-02 | End: 2018-01-02

## 2018-01-02 RX ADMIN — CLOZAPINE 200 MG: 100 TABLET ORAL at 21:53

## 2018-01-02 ASSESSMENT — ENCOUNTER SYMPTOMS
HEMATOLOGIC/LYMPHATIC NEGATIVE: 1
RESPIRATORY NEGATIVE: 1
ALLERGIC/IMMUNOLOGIC NEGATIVE: 1
EYES NEGATIVE: 1
MUSCULOSKELETAL NEGATIVE: 1
HALLUCINATIONS: 1
NEUROLOGICAL NEGATIVE: 1
CARDIOVASCULAR NEGATIVE: 1
GASTROINTESTINAL NEGATIVE: 1
CONSTITUTIONAL NEGATIVE: 1
ENDOCRINE NEGATIVE: 1

## 2018-01-02 NOTE — TELEPHONE ENCOUNTER
Chart reviewed. Patient has not yet established care with Matilda High, RN, MS, APRN. Unclear who the PCP is.     RN attempted outreach.  RN could NOT leave a voicemail, mailbox is full.     Patient should be directed to seek care at the nearest emergency room. Patient could also seek assistance from [facility], his PCP, or current psychiatry provider.

## 2018-01-02 NOTE — TELEPHONE ENCOUNTER
Patient is calling back stated he just got off the phone with nurse but is very nervous about voices, would like to speak with provider about what he is to do.  Please call to advice  Thank you

## 2018-01-02 NOTE — TELEPHONE ENCOUNTER
"Call was transferred to me by [facility], concerned about patient reporting he was hearing voices,  \"I am hearing voices in my head and they are not good. \"  States he did talk to Jacky Agustin about this when he was in on 12/29/17  \"I have had these for a year . It is not normal to have these voices in your head. I can't wait until the 15th to see the psychiatrist. \"    Right now is at his Mom's in [place]. )   When asked what has changed,   \"the voices have increased since I was in to see Jacky. \"  Specifically denies that the voices are telling him to hurt himself or others.   No suicidal thoughts or plan.   No homicidal thoughts or plan.   \"the pills help. I love taking my pills.  \"  \"I am supposed to get my blood done tomorrow and  more pills then, \"    When asked how I can help him today, he says \"I don't know but I will just go to the emergency room if I have to\"   Reviewed note from recent clinic visit , and advised him to perhaps see Jacky Agustin again, as per his note, (if he can't get in to psych to see him again)   \"Ok, well, ok, I will call him then. \"  Also encouraged him to call [facility] again to possibly be seen sooner somewhere else. \"ok. \"  Gave him that number again also.     Matilda/ CM, palmira. He has appt with Matilda on the 15th which I did verify with him.    Isabelle Akins RNC          "

## 2018-01-03 ENCOUNTER — HOSPITAL ENCOUNTER (INPATIENT)
Facility: CLINIC | Age: 23
LOS: 1 days | Discharge: HOME | DRG: 885 | End: 2018-01-03
Attending: PSYCHIATRY & NEUROLOGY | Admitting: PSYCHIATRY & NEUROLOGY
Payer: COMMERCIAL

## 2018-01-03 VITALS
HEIGHT: 70 IN | TEMPERATURE: 97.1 F | HEART RATE: 93 BPM | DIASTOLIC BLOOD PRESSURE: 84 MMHG | SYSTOLIC BLOOD PRESSURE: 129 MMHG | BODY MASS INDEX: 19.61 KG/M2 | RESPIRATION RATE: 16 BRPM | WEIGHT: 137 LBS

## 2018-01-03 DIAGNOSIS — Z79.899 ENCOUNTER FOR LONG-TERM (CURRENT) USE OF MEDICATIONS: Primary | ICD-10-CM

## 2018-01-03 DIAGNOSIS — F25.0 SCHIZOAFFECTIVE DISORDER, BIPOLAR TYPE (H): ICD-10-CM

## 2018-01-03 PROBLEM — R45.851 SUICIDAL THOUGHTS: Status: ACTIVE | Noted: 2018-01-03

## 2018-01-03 PROCEDURE — 25000132 ZZH RX MED GY IP 250 OP 250 PS 637: Performed by: EMERGENCY MEDICINE

## 2018-01-03 PROCEDURE — 12400007 ZZH R&B MH INTERMEDIATE UMMC

## 2018-01-03 PROCEDURE — 25000132 ZZH RX MED GY IP 250 OP 250 PS 637: Performed by: PSYCHIATRY & NEUROLOGY

## 2018-01-03 RX ORDER — LAMOTRIGINE 25 MG/1
50 TABLET ORAL DAILY
Status: DISCONTINUED | OUTPATIENT
Start: 2018-01-03 | End: 2018-01-03 | Stop reason: HOSPADM

## 2018-01-03 RX ORDER — CLOZAPINE 100 MG/1
200 TABLET ORAL 2 TIMES DAILY
Status: DISCONTINUED | OUTPATIENT
Start: 2018-01-03 | End: 2018-01-03 | Stop reason: HOSPADM

## 2018-01-03 RX ORDER — PERPHENAZINE 4 MG/1
4-8 TABLET ORAL 2 TIMES DAILY PRN
Qty: 30 TABLET | Refills: 0 | Status: SHIPPED | OUTPATIENT
Start: 2018-01-03 | End: 2022-06-16

## 2018-01-03 RX ORDER — DIAZEPAM 5 MG
5 TABLET ORAL
Status: COMPLETED | OUTPATIENT
Start: 2018-01-03 | End: 2018-01-03

## 2018-01-03 RX ORDER — TRAZODONE HYDROCHLORIDE 50 MG/1
50 TABLET, FILM COATED ORAL
Status: DISCONTINUED | OUTPATIENT
Start: 2018-01-03 | End: 2018-01-03 | Stop reason: HOSPADM

## 2018-01-03 RX ORDER — ACETAMINOPHEN 325 MG/1
650 TABLET ORAL EVERY 4 HOURS PRN
Status: DISCONTINUED | OUTPATIENT
Start: 2018-01-03 | End: 2018-01-03 | Stop reason: HOSPADM

## 2018-01-03 RX ORDER — HYDROXYZINE HYDROCHLORIDE 25 MG/1
25-50 TABLET, FILM COATED ORAL EVERY 4 HOURS PRN
Status: DISCONTINUED | OUTPATIENT
Start: 2018-01-03 | End: 2018-01-03 | Stop reason: HOSPADM

## 2018-01-03 RX ORDER — OLANZAPINE 10 MG/2ML
10 INJECTION, POWDER, FOR SOLUTION INTRAMUSCULAR
Status: DISCONTINUED | OUTPATIENT
Start: 2018-01-03 | End: 2018-01-03 | Stop reason: HOSPADM

## 2018-01-03 RX ORDER — OLANZAPINE 10 MG/1
10 TABLET ORAL
Status: DISCONTINUED | OUTPATIENT
Start: 2018-01-03 | End: 2018-01-03 | Stop reason: HOSPADM

## 2018-01-03 RX ORDER — ALUMINA, MAGNESIA, AND SIMETHICONE 2400; 2400; 240 MG/30ML; MG/30ML; MG/30ML
30 SUSPENSION ORAL EVERY 4 HOURS PRN
Status: DISCONTINUED | OUTPATIENT
Start: 2018-01-03 | End: 2018-01-03 | Stop reason: HOSPADM

## 2018-01-03 RX ORDER — LAMOTRIGINE 25 MG/1
TABLET ORAL
Refills: 0 | COMMUNITY
Start: 2018-01-03 | End: 2018-02-01

## 2018-01-03 RX ADMIN — DIAZEPAM 5 MG: 5 TABLET ORAL at 00:11

## 2018-01-03 RX ADMIN — LAMOTRIGINE 50 MG: 25 TABLET ORAL at 11:33

## 2018-01-03 RX ADMIN — CLOZAPINE 200 MG: 100 TABLET ORAL at 11:32

## 2018-01-03 ASSESSMENT — ACTIVITIES OF DAILY LIVING (ADL): GROOMING: SHOWER

## 2018-01-03 NOTE — PROGRESS NOTES
01/03/18 0134   Patient Belongings   Did you bring any home meds/supplements to the hospital?  No   Patient Belongings clothing;cell phone/electronics;shoes   Belongings Search Yes   Clothing Search Yes   Second Staff Amie MELENDEZ   General Info Comment Non sent to security     Items kept in storage  Coat, Cell phone, Sweat shirt (hoodie), Shoes, Belt, Hat.    Non sent to security    No ID, No Credit Card, No Cash    Admission Signature    Patient Signature      __________________    Staff Signature      _______________     2nd Staff if pt can't sign      ___________________    Discharge Signature    All my personal items were returned to me     Patient Signature      __________________    Staff Signature      __________________

## 2018-01-03 NOTE — ED NOTES
Pt brought back to room 5.  Health officer hold placed on pt and security called.   Pt reports hallucinations with seeing objects x 4 days and hallucinations with self harm.    Pt placed in orange scrubs.   Pt remains calm and cooperative.   Pt denies drug use.   Explained procedure and protocol to pt, pt gave verbal understanding

## 2018-01-03 NOTE — PLAN OF CARE
Problem: Suicidal Behavior (Adult)  Goal: Suicidal Behavior is Absent/Minimized/Managed  Outcome: No Change  Patient is denying thoughts of suicide, mostly complains of anxiety. The plan is for him to discharge to home with a RX of Stelazine. He states feeling safe for discharge. Will follow up with outside providers.
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
## 2018-01-03 NOTE — IP AVS SNAPSHOT
MRN:[id number]                      After Visit Summary   1/3/2018    Joseph Ayon Jr    MRN: [id number]           Thank you!     Thank you for choosing [facility] for your care. Our goal is always to provide you with excellent care.        Patient Information     Date Of Birth          1995        About your hospital stay     You were admitted on:  January 3, 2018 You last received care in the:  [facility] 30NR    You were discharged on:  January 3, 2018       Who to Call     For medical emergencies, please call 911.  For non-urgent questions about your medical care, please call your primary care provider or clinic, None          Attending Provider     Provider Garth Shepherd MD Psychiatry       Primary Care Provider Fax #    Physician No Ref-Primary [phone]      Your next 10 appointments already scheduled     Sedrick 15, 2018  8:45 AM CST   New Visit with GORGE Rabago [facility] ([facility])    [address] [facility] [address] [phone]              Future tests that were ordered for you     Clozapine Level ([facility])                 Further instructions from your care team        Behavioral Discharge Planning and Instructions      Summary:  You were admitted on 1/3/2018  due to Psychotic Symptomology.  You were treated by Dr. Garth Tapia MD and discharged on 01/03/2018 from [facility] to your mother's Home in [place].  Your mother was in agreement with your plan.      Principal Diagnosis:   Schizoaffective Disorder    Health Care Follow-up Appointments:   Date/Time: Monday, January 15, 2018 @ 8:45am   Provider: GORGE Gaines, [facility]  [address] [place] [address]  Phone: [phone]  Attend all scheduled appointments with your outpatient providers. Call at least 24 hours in advance if you need to reschedule an appointment to ensure continued access to your  outpatient providers.   Major Treatments, Procedures and Findings:  You were provided with: a psychiatric assessment, assessed for medical stability, medication evaluation and/or management, group therapy, individual therapy, milieu management and medical interventions    Symptoms to Report: feeling more aggressive, increased confusion, losing more sleep, mood getting worse or thoughts of suicide    Early warning signs can include: increased depression or anxiety sleep disturbances increased thoughts or behaviors of suicide or self-harm  increased unusual thinking, such as paranoia or hearing voices    Safety and Wellness:  Take all medicines as directed.  Make no changes unless your doctor suggests them.      Follow treatment recommendations.  Refrain from alcohol and non-prescribed drugs.  If there is a concern for safety, call [phone].    Resources:   [place] [facility] Crisis [facility] - [facility] Part - 6.012.975.6935    [place] Mental Health Intake:   In the [facility] call [phone] for information about services.   In [place], [place] or [place] call [phone] or [phone] for information.  For [place]  and mental health intake call [phone].    [place] of [facility]ness  You and your family would benefit from the support groups at [facility] [place] [facility].   Www.namihelps.org    www.[facility].org    The [place] [facility]ness - [facility] has a parent support and educator staff - Himanshu Bazzi - that can be a support for your parents. There are also many classes that are available to you and your family.  Himanshu can be reached at [phone] xt [phone] or through e-mail at [email or web address]@[email or web address].   Joseph,  please take care and make your recovery a daily recovery.  The treatment team has appreciated the opportunity to work with you.  If you have any questions or concerns  "our unit number is [phone].  You may be receiving a follow-up phone call within the next three days from a representative from behavioral [facility].  You have identified the best phone number to reach you as [phone].          Pending Results     No orders found from 2018 to 2018.            Admission Information     Date & Time Provider Department Dept. Phone    1/3/2018 Garth Tapia MD [facility] 30NR [phone]      Your Vitals Were     Blood Pressure Pulse Temperature Respirations Height Weight    129/84 (BP Location: Right arm) 93 97.1  F (36.2  C) (Oral) 16 1.778 m (5' 10\") 62.1 kg (137 lb)    BMI (Body Mass Index)                   19.66 kg/m2           MyChart Information     [employer]APP lets you send messages to your doctor, view your test results, renew your prescriptions, schedule appointments and more. To sign up, go to [email or web address] . Click on \"Log in\" on the left side of the screen, which will take you to the Welcome page. Then click on \"Sign up Now\" on the right side of the page.     You will be asked to enter the access code listed below, as well as some personal information. Please follow the directions to create your username and password.     Your access code is: [email or web address]  Expires: 3/18/2018 11:27 AM     Your access code will  in 90 days. If you need help or a new code, please call your [facility] clinic or [phone].        Care EveryWhere ID     This is your Care EveryWhere ID. This could be used by other organizations to access your [facility] medical records  [id number]        Equal Access to Services     GALLITO ACUNA : Hadii sruthi Toscano, emilie almaraz, dwayne sánchez. So [facility] [phone].    ATENCIÓN: Si habla español, tiene a little disposición servicios gratuitos de asistencia lingüística. Llame al [phone].    We comply with applicable federal civil rights laws and [place] laws. We do " not discriminate on the basis of race, color, national origin, age, disability, sex, sexual orientation, or gender identity.               Review of your medicines      START taking        Dose / Directions    perphenazine 4 MG tablet   Used for:  Schizoaffective disorder, bipolar type (H)        Dose:  4-8 mg   Take 1-2 tablets (4-8 mg) by mouth 2 times daily as needed for hallucinations   Quantity:  30 tablet   Refills:  0         CONTINUE these medicines which may have CHANGED, or have new prescriptions. If we are uncertain of the size of tablets/capsules you have at home, strength may be listed as something that might have changed.        Dose / Directions    lamoTRIgine 25 MG tablet   Commonly known as:  LaMICtal   This may have changed:  additional instructions   Used for:  Schizoaffective disorder, bipolar type (H)        Take 2 tabs daily (50mg)   Refills:  0         CONTINUE these medicines which have NOT CHANGED        Dose / Directions    Clozapine 200 MG tablet   Commonly known as:  CLOZARIL   Used for:  Psychosis, unspecified psychosis type        Dose:  200 mg   Take 1 tablet (200 mg) by mouth 2 times daily   Quantity:  30 tablet   Refills:  1            Where to get your medicines      These medications were sent to [facility] [address]  [address] [address]     Phone:  [phone]     perphenazine 4 MG tablet                Protect others around you: Learn how to safely use, store and throw away your medicines at www.disposemymeds.org.             Medication List: This is a list of all your medications and when to take them. Check marks below indicate your daily home schedule. Keep this list as a reference.      Medications           Morning Afternoon Evening Bedtime As Needed    Clozapine 200 MG tablet   Commonly known as:  CLOZARIL   Take 1 tablet (200 mg) by mouth 2 times daily   Last time this was given:  200 mg on 1/3/2018 11:32  AM                                lamoTRIgine 25 MG tablet   Commonly known as:  LaMICtal   Take 2 tabs daily (50mg)   Last time this was given:  50 mg on 1/3/2018 11:33 AM                                perphenazine 4 MG tablet   Take 1-2 tablets (4-8 mg) by mouth 2 times daily as needed for hallucinations

## 2018-01-03 NOTE — PROGRESS NOTES
S:  Pt was brought to [facility] ED by his uncle for increased auditory hallucinations with disorganized thoughts and bipolar with moderate judi  He arrived on station 30 via ambulance. He is voluntary.  B:  He was committed in the past with similar symptoms in 2015.   He lives with his mother in [place].  He reports Auditory hallucinations have gotten worse over the past 4 days.  His UDS was negative.  A:  Pt was angry when he arrived on the unit.  He did not want to answer any questions.  Nurse asked pt if he was suicidal at this current moment he said no.  He contracted for safety and stated he has never tried to escape from a unit before.  Pt does not want flu shot.  He refused to answer anymore questions and told this staff to look up the info needed on the computer.  He signed consent for care and went to bed.  R:  Complete admission.  Continue to monitor.

## 2018-01-03 NOTE — ED PROVIDER NOTES
History     Chief Complaint   Patient presents with     Hallucinations     hearing voices to hurt himself     HPI  Joseph Ayon Jr is a 22 year old male with a history of auditory hallucinations including bipolar disorder with moderate judi, psychosis presented for increasing auditory hallucinations with disorganized thoughts.  He is dropped off by his uncle.  He lives with his mother in [facility].  He has been committed in the past for similar symptoms in 2015.  Patient reports he has not needed much sleep he also reports the intensity of his auditory hallucinations is worsening.  He reports he has thoughts of ending it all.    Problem List:    Patient Active Problem List    Diagnosis Date Noted     Bipolar 1 disorder, manic, moderate (H) 12/29/2017     Priority: Medium     Psychosis, unspecified psychosis type 12/29/2017     Priority: Medium     Diagnosis deferred 09/18/2017     Priority: Medium     Psychosis 01/13/2014     Priority: Medium        Past Medical History:    No past medical history on file.    Past Surgical History:    Past Surgical History:   Procedure Laterality Date     NO HISTORY OF SURGERY         Family History:    Family History   Problem Relation Age of Onset     Neurologic Disorder Maternal Grandmother        Social History:  Marital Status:  Single [1]  Social History   Substance Use Topics     Smoking status: Current Every Day Smoker     Packs/day: 1.00     Years: 9.00     Types: Cigarettes     Smokeless tobacco: Never Used     Alcohol use Yes      Comment: rarely        Medications:      lamoTRIgine (LAMICTAL) 25 MG tablet   Clozapine (CLOZARIL) 200 MG tablet         Review of Systems   Constitutional: Negative.    HENT: Negative.    Eyes: Negative.    Respiratory: Negative.    Cardiovascular: Negative.    Gastrointestinal: Negative.    Endocrine: Negative.    Genitourinary: Negative.    Musculoskeletal: Negative.    Skin: Negative.    Allergic/Immunologic: Negative.     Neurological: Negative.    Hematological: Negative.    Psychiatric/Behavioral: Positive for hallucinations (auditory).       Physical Exam   Pulse: 105  Temp: 98.5  F (36.9  C)  Resp: 16  Weight: 63.5 kg (140 lb)  SpO2: 98 %      Physical Exam   Constitutional: He is oriented to person, place, and time. He appears well-developed and well-nourished. No distress.   HENT:   Head: Normocephalic and atraumatic.   Eyes: Conjunctivae and EOM are normal. Pupils are equal, round, and reactive to light. Right eye exhibits no discharge. Left eye exhibits no discharge. No scleral icterus.   Neck: Normal range of motion. Neck supple. No JVD present. No tracheal deviation present. No thyromegaly present.   Cardiovascular: Normal rate and regular rhythm.  Exam reveals no gallop and no friction rub.    No murmur heard.  Pulmonary/Chest: Effort normal and breath sounds normal. No stridor.   Abdominal: Soft. Bowel sounds are normal. He exhibits no distension and no mass. There is no tenderness. There is no rebound and no guarding.   Lymphadenopathy:     He has no cervical adenopathy.   Neurological: He is alert and oriented to person, place, and time.   Skin: No rash noted. He is not diaphoretic. No erythema. No pallor.   Psychiatric: His speech is tangential. He expresses suicidal ideation.       ED Course     ED Course     Procedures               Critical Care time:  none                 ED medications:  Medications   cloZAPine (CLOZARIL) tablet 200 mg (200 mg Oral Given 1/2/18 1345)   diazepam (VALIUM) tablet 5 mg (5 mg Oral Given 1/3/18 0011)         ED labs and imaging:  Results for orders placed or performed during the hospital encounter of 01/02/18   Drug Screen Urine   Result Value Ref Range    Amphetamine Qual Urine Negative NEG^Negative    Barbiturates Qual Urine Negative NEG^Negative    Benzodiazepine Qual Urine Negative NEG^Negative    Cannabinoids Qual Urine Negative NEG^Negative    Cocaine Qual Urine Negative  NEG^Negative    Opiates Qualitative Urine Negative NEG^Negative    PCP Qual Urine Negative NEG^Negative           ED vitals:  Vitals:    01/02/18 2018   Pulse: 105   Resp: 16   Temp: 98.5  F (36.9  C)   TempSrc: Temporal   SpO2: 98%   Weight: 63.5 kg (140 lb)     Assessments & Plan (with Medical Decision Making)   Clinical impression: 22-year-old male who presented for concern for auditory hallucinations with suicidal ideations.  Patient has a history of paranoia, psychosis, severe judi with bipolar and suicidal thoughts and a history of being at risk for hurting other people.  He has been committed in the past in 2015. He arrived today stating he is hearing voices telling him to hurt himself.  His auditory hallucinations are not new but he reports the intensity has worsened and reports being suicidal.  He does not report a definite plan.  He was seen in clinic on December 29, 2017.  Patient has disorganized thinking, he also reports that he used to be a successful disc golf player and his auditory hallucinations have affected his career.  He is also manic reporting that he has not required much sleep lately. On my exam he is pleasant.   GCS is 15. Poor insight, disorganized thinking and tangential.    ED course and Plan:  I reviewed his medical record including his clinic visit on December 29.  Please see his medical records for details of care provided.  Because of his history of psychosis with auditory hallucination despite compliance with his medications DEC assessment is completed. Please see DEC consult note for details  DEC consult was completed by Sherly Garay..  After discussion, assessment and evaluation I felt it  is best that the patient is admitted for further care.  He was placed on a transport hold.  He received his nighttime dose of his clozaril.  Patient was given a dose of oral Valium to help him with relaxation prior to transport in the context of pacing in the room consistent with  keli.      Disclaimer: This note consists of symbols derived from keyboarding, dictation and/or voice recognition software. As a result, there may be errors in the script that have gone undetected. Please consider this when interpreting information found in this chart.  I have reviewed the nursing notes.    I have reviewed the findings, diagnosis, plan and need for follow up with the patient.       New Prescriptions    No medications on file       Final diagnoses:   Auditory hallucinations   Disorganized thinking   Keli (H)       1/2/2018   [facility] EMERGENCY DEPARTMENT     Luís Duque MD  01/03/18 0131

## 2018-01-03 NOTE — PLAN OF CARE
Pt has yet to attend OT-facilitated group sessions. Pt will be encouraged to attend groups and be provided a self assessment form, or writer will review this information verbally with patient.  OT staff will explain the value of OT including pt in treatment planning and offer options to meet needs and identified goals.

## 2018-01-03 NOTE — IP AVS SNAPSHOT
30    [address]    [facility] [address]    Phone:  [phone]                                       After Visit Summary   1/3/2018    Joseph Ayon Jr    MRN: [id number]           After Visit Summary Signature Page     I have received my discharge instructions, and my questions have been answered. I have discussed any challenges I see with this plan with the nurse or doctor.    ..........................................................................................................................................  Patient/Patient Representative Signature      ..........................................................................................................................................  Patient Representative Print Name and Relationship to Patient    ..................................................               ................................................  Date                                            Time    ..........................................................................................................................................  Reviewed by Signature/Title    ...................................................              ..............................................  Date                                                            Time

## 2018-01-04 DIAGNOSIS — F20.0 CHRONIC PARANOID SCHIZOPHRENIA (H): ICD-10-CM

## 2018-01-04 LAB
BASOPHILS # BLD AUTO: 0 10E9/L (ref 0–0.2)
BASOPHILS NFR BLD AUTO: 0.4 %
DIFFERENTIAL METHOD BLD: NORMAL
EOSINOPHIL # BLD AUTO: 0.1 10E9/L (ref 0–0.7)
EOSINOPHIL NFR BLD AUTO: 1.5 %
ERYTHROCYTE [DISTWIDTH] IN BLOOD BY AUTOMATED COUNT: 12.4 % (ref 10–15)
HCT VFR BLD AUTO: 43.2 % (ref 40–53)
HGB BLD-MCNC: 14.6 G/DL (ref 13.3–17.7)
LYMPHOCYTES # BLD AUTO: 2.5 10E9/L (ref 0.8–5.3)
LYMPHOCYTES NFR BLD AUTO: 31.5 %
MCH RBC QN AUTO: 29.9 PG (ref 26.5–33)
MCHC RBC AUTO-ENTMCNC: 33.8 G/DL (ref 31.5–36.5)
MCV RBC AUTO: 88 FL (ref 78–100)
MONOCYTES # BLD AUTO: 0.7 10E9/L (ref 0–1.3)
MONOCYTES NFR BLD AUTO: 8.8 %
NEUTROPHILS # BLD AUTO: 4.6 10E9/L (ref 1.6–8.3)
NEUTROPHILS NFR BLD AUTO: 57.8 %
PLATELET # BLD AUTO: 204 10E9/L (ref 150–450)
RBC # BLD AUTO: 4.89 10E12/L (ref 4.4–5.9)
WBC # BLD AUTO: 7.9 10E9/L (ref 4–11)

## 2018-01-04 PROCEDURE — 99000 SPECIMEN HANDLING OFFICE-LAB: CPT | Performed by: NURSE PRACTITIONER

## 2018-01-04 PROCEDURE — 80159 DRUG ASSAY CLOZAPINE: CPT | Mod: 90 | Performed by: NURSE PRACTITIONER

## 2018-01-04 PROCEDURE — 36415 COLL VENOUS BLD VENIPUNCTURE: CPT | Performed by: NURSE PRACTITIONER

## 2018-01-04 PROCEDURE — 85025 COMPLETE CBC W/AUTO DIFF WBC: CPT | Performed by: NURSE PRACTITIONER

## 2018-01-04 NOTE — DISCHARGE SUMMARY
"COMBINATION DISCHARGE SUMMARY/ADMISSION NOTE      DATE OF ASSESSMENT:  01/03/2018.      IDENTIFYING INFORMATION:  Joseph Ayon Jr. is a 22-year-old single  male currently residing with his parents, unemployed as he awaits his application for disability.      CHIEF COMPLAINT:  \"I don't like being locked up.  I came to the emergency room cause I wanted to talk to a psychiatrist, not get locked up.  I want to go home today.\"        HISTORY OF PRESENT ILLNESS:  The patient has a history of bipolar disorder versus schizophrenia who presented voluntarily to the emergency room reporting a recent worsening of auditory hallucinations.  He was admitted to our behavioral health unit voluntarily.  On examination today, he tells me that over the Christmas holiday he had stopped taking his medications for approximately 3 days as he was busy with various holiday related activities.  Shortly afterward, he noticed worsening of auditory hallucinations which he would make negative comments which progressively worsened over the next few days despite him restarting his medications.  During an intensity of auditory hallucinations, he decided to come to the emergency room in order to talk to a psychiatrist hoping for a quick intervention to alleviate the symptoms.  He was hoping to make it until his appointment on 01/15 with his outpatient psychiatrist, however, due to the intensity of his symptoms, he decided to come to the hospital instead.  On examination today, he tells me that the intensity of what prompted his admission has since resolved.  Auditory hallucinations are decreased today and to a tolerable degree.  He adamantly denies any suicidal thoughts and is hoping to return back home today.  He inquired regarding any interventions which could minimize the intensity of hallucinations after returning home.      PSYCHIATRIC REVIEW OF SYSTEMS:  He denies symptoms corresponding to a depressive episode.  Regarding judi, he " "endorsed decreased need for sleep and racing thoughts.  Otherwise, there were no reports or goal directed behaviors such as elevation of psychomotor activity or impulsivity corresponding to a manic episode.  Regarding psychosis, he endorsed paranoia and auditory hallucinations as mentioned above.  No visual hallucinations reported.  He denied symptoms corresponding to FRANCES, panic disorder, eating disorder or OCD.      PAST PSYCHIATRIC HISTORY:  He first experienced a psychotic break at the age of 18 resulting in hospitalization and eventual involuntary commitment for treatment of mental illness.  He had tried a few antipsychotic medications which he is not able to recall today and at some point was transitioned to a long-acting injectable which he is not able to recall the name of.  He does, however, add that he developed dystonia secondary to the medication which has since resolved.  More recently he has been working with a psychiatrist on titrating up on Clozaril, currently at 400 mg daily with weekly CBCs.  Approximately 1 week ago, Lamictal was added to further augment his Clozaril.  He denied prior suicide attempts or history of SIB.        SUBSTANCE ABUSE HISTORY:  He denied any recent substance usage or chemical addiction.  No prior admissions to detox or treatment.  In the remote past he had smoked cannabis, however, has abstained from that substance for some time.      PAST MEDICAL HISTORY:  No active issues.      FAMILY HISTORY:  No knowledge of mental illness or suicides in the family.      SOCIAL HISTORY:  Refer to the psychosocial assessment completed by our .  He tells me that he resides at home with his family and finds it to be a supportive and positive environment.  He also referenced having 2 children and showed me the tattoos of their names on both arms.  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  He is currently unemployed and tells me that he has a , who is helping him apply for disability.  No legal issues reported.      MEDICAL REVIEW OF SYSTEMS:  Ten point systems were reviewed and were positive for psychiatric symptoms as noted above, otherwise negative.      PHYSICAL EXAMINATION:  Blood pressure is 129/84, respirations 16, pulse 93, temperature 97.1, weight is 137 pounds.  The rest of the physical examination was reviewed in the emergency room documentation.      MENTAL STATUS EXAMINATION:  The patient appears his stated age, appropriately dressed, fair hygiene.  He was in his room sleeping.  He woke up easily and sat up in bed and was fully engaged in the interview.  Eye contact was good.  No psychomotor abnormalities.  Speech was normal, not pushed or pressured.  Mood was described as being \"better\" and his affect was full and appropriate.  He did not appear overtly depressed, tearful, irritable or hostile.  He was not threatening.  He appeared well engaged and help seeking.  Thought process was linear, logical, future oriented.  Thought content did not display evidence of psychosis, although he did report experiencing auditory hallucinations.  He did not appear overtly paranoid and did not discuss any specific delusions.  He did not appear to be responding to any psychotic stimuli.  He denied suicidal and homicidal thoughts.  The content of his discussion did not leave me with a feeling of him being hopeless or helpless.  Insight and judgment were fair.  Associations were intact.  Cognition appeared intact to interviewing including orientation to person, place, time and situation, use of language, fund of knowledge, recent and remote memory.  Muscle strength, tone and gait appear normal on visual inspection.  Insight and judgment are fair.      ASSESSMENT:  Schizoaffective disorder, bipolar type.       PLAN: "  The patient has been admitted to our behavioral health unit under voluntary status after presenting to the emergency room reporting a recent worsening of auditory hallucinations.  Today, he is requesting to be discharged.  I encouraged him to consider continued hospitalization to monitor the improvements he has gained and the stability of these improvements, however, he is declining this recommendation in favor of returning home and resuming outpatient care.  He adamantly denies suicidal thoughts and homicidal thoughts.  He did confirm recent worsening of hallucinations, however, was open to accepting interventions to help minimize these symptoms.  We discussed the addition of Trilafon to be used p.r.n. at 4-8 mg twice a day as needed for breakthrough psychosis.  Risks and benefits of all his medications were reviewed including the risk of rash to Barroso-Donnell syndrome, and agranulocytosis on Clozaril.  He will continue weekly CBCs on an outpatient basis and follow up with his outpatient psychiatrist at his next appointment on 01/15 or sooner if we are able to obtain him one.  The dose of Lamictal will also be increased to 50 mg daily, noting that he has tolerated the medication well over the past 1 week.  He will closely monitor for any rash which may occur and how to intervene in that circumstance was also reviewed.      CONDITION AT DISCHARGE:  Improved, the patient's acute risk for suicide is low due to the following factors:  Improved mood and anxiety, denies suicidal ideation, psychotic symptoms are now tolerable, not actively intoxicated, and plans to abstain from alcohol and illicit substances.  Denied immediate access to guns, denied feeling helpless or hopeless.  He was future oriented.  His chronic risk for suicide is moderate given the following factors:  White race, diagnosis of schizoaffective disorder, unemployed, no prior suicide attempts, no history of suicides in the family.  Preventative  factors include social supports, stable housing, children.      DISPOSITION:  The patient is discharged home per his request.      FOLLOWUP APPOINTMENTS:  Refer to the AVS.      DISCHARGE MEDICATIONS:   1.  Perphenazine 4 mg to 8 mg, 2 times daily as needed for psychosis, quantity of 30 with 0 refills.   2.  Lamictal 50 mg daily.   3.  Clozaril 200 mg twice a day.  He will also be provided with a script for a serum Clozaril level to be drawn at his next scheduled CBC.      PERTINENT LABORATORY DATA:  WBC on  was normal.  Clozaril level on  was not detectable.  Urine drug screen on the day of admission was negative.         YEN GUARDADO MD             D: 2018 11:40   T: 2018 04:15   MT: [place]      Name:     KAYLEIGH MEYER JR   MRN:      [id number]        Account:        [id number]   :      1995           Admit Date:                                       Discharge Date: 2018      Document: [id number]

## 2018-01-07 LAB
CLOZAPINE AND METABOLITES TOTAL: 316 NG/ML
CLOZAPINE SERPL-MCNC: 198 NG/ML
CLOZAPINE-N-OXIDE QUANT: <100 NG/ML
NORCLOZAPINE SERPL-MCNC: 118 NG/ML

## 2018-01-15 ENCOUNTER — OFFICE VISIT (OUTPATIENT)
Dept: PSYCHIATRY | Facility: CLINIC | Age: 23
End: 2018-01-15
Payer: COMMERCIAL

## 2018-01-15 VITALS
TEMPERATURE: 97.6 F | SYSTOLIC BLOOD PRESSURE: 95 MMHG | BODY MASS INDEX: 19.8 KG/M2 | HEART RATE: 92 BPM | DIASTOLIC BLOOD PRESSURE: 74 MMHG | WEIGHT: 138 LBS

## 2018-01-15 DIAGNOSIS — F48.9 MENTAL HEALTH PROBLEM: Primary | ICD-10-CM

## 2018-01-15 PROCEDURE — 99207 ZZC NO CHARGE LOS: CPT | Performed by: CLINICAL NURSE SPECIALIST

## 2018-01-15 ASSESSMENT — ANXIETY QUESTIONNAIRES
6. BECOMING EASILY ANNOYED OR IRRITABLE: NEARLY EVERY DAY
5. BEING SO RESTLESS THAT IT IS HARD TO SIT STILL: NEARLY EVERY DAY
3. WORRYING TOO MUCH ABOUT DIFFERENT THINGS: NEARLY EVERY DAY
IF YOU CHECKED OFF ANY PROBLEMS ON THIS QUESTIONNAIRE, HOW DIFFICULT HAVE THESE PROBLEMS MADE IT FOR YOU TO DO YOUR WORK, TAKE CARE OF THINGS AT HOME, OR GET ALONG WITH OTHER PEOPLE: VERY DIFFICULT
2. NOT BEING ABLE TO STOP OR CONTROL WORRYING: NEARLY EVERY DAY
GAD7 TOTAL SCORE: 20
4. TROUBLE RELAXING: NEARLY EVERY DAY
7. FEELING AFRAID AS IF SOMETHING AWFUL MIGHT HAPPEN: NEARLY EVERY DAY
1. FEELING NERVOUS, ANXIOUS, OR ON EDGE: MORE THAN HALF THE DAYS

## 2018-01-15 ASSESSMENT — PATIENT HEALTH QUESTIONNAIRE - PHQ9: SUM OF ALL RESPONSES TO PHQ QUESTIONS 1-9: 20

## 2018-01-15 NOTE — NURSING NOTE
"Chief Complaint   Patient presents with     Consult       Initial BP 95/74 (BP Location: Right arm, Patient Position: Sitting, Cuff Size: Adult Regular)  Pulse 92  Temp 97.6  F (36.4  C) (Tympanic)  Wt 138 lb (62.6 kg)  BMI 19.8 kg/m2 Estimated body mass index is 19.8 kg/(m^2) as calculated from the following:    Height as of 1/3/18: 5' 10\" (1.778 m).    Weight as of this encounter: 138 lb (62.6 kg).  Medication Reconciliation: complete    "

## 2018-01-15 NOTE — PROGRESS NOTES
"                                                         Met with patient briefly explaining the Collaborative Care Model. Informed the patient that I would meet with him, prescribe medications as needed, until he could establish a long term psychiatric provider. Offered referral to care coordination to assist him in establishing care with a primary care provider and a long term psychiatric provider. Patient stated \"This is all fucked up\" and left the room. He did accept a list of community psychiatric providers.     "

## 2018-01-15 NOTE — MR AVS SNAPSHOT
"              After Visit Summary   1/15/2018    Joseph Ayon Jr    MRN: [id number]           Patient Information     Date Of Birth          1995        Visit Information        Provider Department      1/15/2018 8:45 AM Matilda High APRN CNS [facility]        Today's Diagnoses     Mental health problem    -  1       Follow-ups after your visit        Who to contact     If you have questions or need follow up information about today's clinic visit or your schedule please contact [facility] directly at [phone].  Normal or non-critical lab and imaging results will be communicated to you by [employer], letter or phone within 4 business days after the clinic has received the results. If you do not hear from us within 7 days, please contact the clinic through [employer] or phone. If you have a critical or abnormal lab result, we will notify you by phone as soon as possible.  Submit refill requests through [employer] or call your pharmacy and they will forward the refill request to us. Please allow 3 business days for your refill to be completed.          Additional Information About Your Visit        MyChart Information     [employer] lets you send messages to your doctor, view your test results, renew your prescriptions, schedule appointments and more. To sign up, go to [email or web address] . Click on \"Log in\" on the left side of the screen, which will take you to the Welcome page. Then click on \"Sign up Now\" on the right side of the page.     You will be asked to enter the access code listed below, as well as some personal information. Please follow the directions to create your username and password.     Your access code is: [email or web address]  Expires: 3/18/2018 11:27 AM     Your access code will  in 90 days. If you need help or a new code, please call your [facility] or [phone].        Care EveryWhere ID     This is your Care EveryWhere ID. This could be used " by other organizations to access your [facility] medical records  [id number]        Your Vitals Were     Pulse Temperature BMI (Body Mass Index)             92 97.6  F (36.4  C) (Tympanic) 19.8 kg/m2          Blood Pressure from Last 3 Encounters:   01/15/18 95/74   01/03/18 129/84   12/29/17 136/85    Weight from Last 3 Encounters:   01/15/18 138 lb (62.6 kg)   01/03/18 137 lb (62.1 kg)   01/02/18 140 lb (63.5 kg)              Today, you had the following     No orders found for display       Primary Care Provider Fax #    Physician No Ref-Primary [phone]       No address on file        Equal Access to Services     VIDYA ACUNA : Shantelle Toscano, emilie almaraz, alie madrid, dwayne castillo . So [facility] [phone].    ATENCIÓN: Si habla español, tiene a little disposición servicios gratuitos de asistencia lingüística. Llame al [phone].    We comply with applicable federal civil rights laws and [place] laws. We do not discriminate on the basis of race, color, national origin, age, disability, sex, sexual orientation, or gender identity.            Thank you!     Thank you for choosing [facility]  for your care. Our goal is always to provide you with excellent care. Hearing back from our patients is one way we can continue to improve our services. Please take a few minutes to complete the written survey that you may receive in the mail after your visit with us. Thank you!             Your Updated Medication List - Protect others around you: Learn how to safely use, store and throw away your medicines at www.disposemymeds.org.          This list is accurate as of: 1/15/18  9:28 AM.  Always use your most recent med list.                   Brand Name Dispense Instructions for use Diagnosis    Clozapine 200 MG tablet    CLOZARIL    30 tablet    Take 1 tablet (200 mg) by mouth 2 times daily    Psychosis, unspecified psychosis type       lamoTRIgine  25 MG tablet    LaMICtal     Take 2 tabs daily (50mg)    Schizoaffective disorder, bipolar type (H)       perphenazine 4 MG tablet     30 tablet    Take 1-2 tablets (4-8 mg) by mouth 2 times daily as needed for hallucinations    Schizoaffective disorder, bipolar type (H)

## 2018-01-16 ASSESSMENT — ANXIETY QUESTIONNAIRES: GAD7 TOTAL SCORE: 20

## 2018-01-19 ENCOUNTER — TELEPHONE (OUTPATIENT)
Dept: FAMILY MEDICINE | Facility: CLINIC | Age: 23
End: 2018-01-19

## 2018-01-19 DIAGNOSIS — F20.0 CHRONIC PARANOID SCHIZOPHRENIA (H): ICD-10-CM

## 2018-01-19 LAB
BASOPHILS # BLD AUTO: 0 10E9/L (ref 0–0.2)
BASOPHILS NFR BLD AUTO: 0.5 %
CRP SERPL-MCNC: <2.9 MG/L (ref 0–8)
DIFFERENTIAL METHOD BLD: NORMAL
EOSINOPHIL # BLD AUTO: 0 10E9/L (ref 0–0.7)
EOSINOPHIL NFR BLD AUTO: 0.6 %
ERYTHROCYTE [DISTWIDTH] IN BLOOD BY AUTOMATED COUNT: 12.2 % (ref 10–15)
HCT VFR BLD AUTO: 42 % (ref 40–53)
HGB BLD-MCNC: 14 G/DL (ref 13.3–17.7)
LYMPHOCYTES # BLD AUTO: 1.9 10E9/L (ref 0.8–5.3)
LYMPHOCYTES NFR BLD AUTO: 29.3 %
MCH RBC QN AUTO: 29.4 PG (ref 26.5–33)
MCHC RBC AUTO-ENTMCNC: 33.3 G/DL (ref 31.5–36.5)
MCV RBC AUTO: 88 FL (ref 78–100)
MONOCYTES # BLD AUTO: 0.6 10E9/L (ref 0–1.3)
MONOCYTES NFR BLD AUTO: 9.2 %
NEUTROPHILS # BLD AUTO: 3.9 10E9/L (ref 1.6–8.3)
NEUTROPHILS NFR BLD AUTO: 60.4 %
PLATELET # BLD AUTO: 199 10E9/L (ref 150–450)
RBC # BLD AUTO: 4.76 10E12/L (ref 4.4–5.9)
TROPONIN I SERPL-MCNC: <0.015 UG/L (ref 0–0.04)
WBC # BLD AUTO: 6.4 10E9/L (ref 4–11)

## 2018-01-19 PROCEDURE — 99000 SPECIMEN HANDLING OFFICE-LAB: CPT | Performed by: NURSE PRACTITIONER

## 2018-01-19 PROCEDURE — 84484 ASSAY OF TROPONIN QUANT: CPT | Performed by: NURSE PRACTITIONER

## 2018-01-19 PROCEDURE — 80159 DRUG ASSAY CLOZAPINE: CPT | Mod: 90 | Performed by: NURSE PRACTITIONER

## 2018-01-19 PROCEDURE — 86140 C-REACTIVE PROTEIN: CPT | Performed by: NURSE PRACTITIONER

## 2018-01-19 PROCEDURE — 36415 COLL VENOUS BLD VENIPUNCTURE: CPT | Performed by: NURSE PRACTITIONER

## 2018-01-19 PROCEDURE — 85025 COMPLETE CBC W/AUTO DIFF WBC: CPT | Performed by: NURSE PRACTITIONER

## 2018-01-19 NOTE — TELEPHONE ENCOUNTER
Calling to states Jacky needs to enroll in the Clozapine Rems program to prescribe medication for this patient.

## 2018-01-22 LAB
CLOZAPINE AND METABOLITES TOTAL: 1201 NG/ML
CLOZAPINE SERPL-MCNC: 752 NG/ML
CLOZAPINE-N-OXIDE QUANT: 223 NG/ML
NORCLOZAPINE SERPL-MCNC: 226 NG/ML

## 2018-01-22 NOTE — TELEPHONE ENCOUNTER
Sarah Beth  calling to check on status of request for patients Clozapine, patient will be out of this medication after today wondering if script could be changed to at least a 60 days. Wondering why qty is only 15 days.  Could patient get a 60 day refill  Patient will see  new provider has appointment March 15th with Elin. Please call to advise.

## 2018-01-22 NOTE — TELEPHONE ENCOUNTER
Tried to get this done over the weekend, but the [facility] site was experiencing some technical problems. Will try again when i'm back in the clinic on Tuesday.

## 2018-01-23 ENCOUNTER — DOCUMENTATION ONLY (OUTPATIENT)
Dept: FAMILY MEDICINE | Facility: OTHER | Age: 23
End: 2018-01-23

## 2018-01-23 PROBLEM — M67.919 DISORDER OF BURSAE AND TENDONS IN SHOULDER REGION: Status: ACTIVE | Noted: 2018-01-23

## 2018-01-23 PROBLEM — M71.9 DISORDER OF BURSAE AND TENDONS IN SHOULDER REGION: Status: ACTIVE | Noted: 2018-01-23

## 2018-01-23 NOTE — TELEPHONE ENCOUNTER
[facility] is not certified to dispense Clozapine, but  [place] is. Pharmacist will be filling in [place]. Message left with Sarah Beth to return call, no answer when patient was called and his mailbox is full.

## 2018-01-23 NOTE — TELEPHONE ENCOUNTER
Cristian, needs to have lab work done. I have now completed the clozapine rems certification. I will refill him a month at a time. He needs to see me Thursday to recheck a blood count and to see how he's doing. Will prescribe more clozapine at that time as long as no severe neutropenia.

## 2018-01-24 NOTE — TELEPHONE ENCOUNTER
Appointment with Jacky on 01/25/18. Routing message to provider, Clozapine still needs to be dispensed to [facility]. Patient would like to  today.

## 2018-01-25 ENCOUNTER — TELEPHONE (OUTPATIENT)
Dept: PHARMACY | Facility: CLINIC | Age: 23
End: 2018-01-25

## 2018-01-25 ENCOUNTER — OFFICE VISIT (OUTPATIENT)
Dept: FAMILY MEDICINE | Facility: CLINIC | Age: 23
End: 2018-01-25
Payer: COMMERCIAL

## 2018-01-25 VITALS
SYSTOLIC BLOOD PRESSURE: 124 MMHG | RESPIRATION RATE: 18 BRPM | BODY MASS INDEX: 20.04 KG/M2 | HEART RATE: 106 BPM | HEIGHT: 70 IN | WEIGHT: 140 LBS | OXYGEN SATURATION: 97 % | DIASTOLIC BLOOD PRESSURE: 82 MMHG

## 2018-01-25 DIAGNOSIS — Z79.899 HIGH RISK MEDICATION USE: Primary | ICD-10-CM

## 2018-01-25 DIAGNOSIS — F29 PSYCHOSIS, UNSPECIFIED PSYCHOSIS TYPE (H): ICD-10-CM

## 2018-01-25 DIAGNOSIS — F31.13 BIPOLAR AFFECTIVE DISORDER, MANIC, SEVERE (H): ICD-10-CM

## 2018-01-25 LAB
BASOPHILS # BLD AUTO: 0 10E9/L (ref 0–0.2)
BASOPHILS NFR BLD AUTO: 0.2 %
DIFFERENTIAL METHOD BLD: NORMAL
EOSINOPHIL # BLD AUTO: 0.1 10E9/L (ref 0–0.7)
EOSINOPHIL NFR BLD AUTO: 1.2 %
ERYTHROCYTE [DISTWIDTH] IN BLOOD BY AUTOMATED COUNT: 12.4 % (ref 10–15)
HCT VFR BLD AUTO: 42 % (ref 40–53)
HGB BLD-MCNC: 14.1 G/DL (ref 13.3–17.7)
LYMPHOCYTES # BLD AUTO: 2.1 10E9/L (ref 0.8–5.3)
LYMPHOCYTES NFR BLD AUTO: 24.9 %
MCH RBC QN AUTO: 29.6 PG (ref 26.5–33)
MCHC RBC AUTO-ENTMCNC: 33.6 G/DL (ref 31.5–36.5)
MCV RBC AUTO: 88 FL (ref 78–100)
MONOCYTES # BLD AUTO: 0.8 10E9/L (ref 0–1.3)
MONOCYTES NFR BLD AUTO: 9.1 %
NEUTROPHILS # BLD AUTO: 5.5 10E9/L (ref 1.6–8.3)
NEUTROPHILS NFR BLD AUTO: 64.6 %
PLATELET # BLD AUTO: 203 10E9/L (ref 150–450)
RBC # BLD AUTO: 4.77 10E12/L (ref 4.4–5.9)
WBC # BLD AUTO: 8.6 10E9/L (ref 4–11)

## 2018-01-25 PROCEDURE — 99214 OFFICE O/P EST MOD 30 MIN: CPT | Performed by: PHYSICIAN ASSISTANT

## 2018-01-25 PROCEDURE — 85025 COMPLETE CBC W/AUTO DIFF WBC: CPT | Performed by: PHYSICIAN ASSISTANT

## 2018-01-25 PROCEDURE — 36415 COLL VENOUS BLD VENIPUNCTURE: CPT | Performed by: PHYSICIAN ASSISTANT

## 2018-01-25 NOTE — MR AVS SNAPSHOT
"              After Visit Summary   2018    Joseph Aoyn Jr    MRN: [id number]           Patient Information     Date Of Birth          1995        Visit Information        Provider Department      2018 3:00 PM Jacky Agustin [facility]-C [facility]        Today's Diagnoses     High risk medication use    -  1    Psychosis, unspecified psychosis type        Bipolar affective disorder, manic, severe (H)           Follow-ups after your visit        Who to contact     Normal or non-critical lab and imaging results will be communicated to you by [employer], letter or phone within 4 business days after the clinic has received the results. If you do not hear from us within 7 days, please contact the clinic through [employer] or phone. If you have a critical or abnormal lab result, we will notify you by phone as soon as possible.  Submit refill requests through [employer] or call your pharmacy and they will forward the refill request to us. Please allow 3 business days for your refill to be completed.          If you need to speak with a  for additional information , please call: [phone]             Additional Information About Your Visit        [employer] Information     [employer] lets you send messages to your doctor, view your test results, renew your prescriptions, schedule appointments and more. To sign up, go to [email or web address] . Click on \"Log in\" on the left side of the screen, which will take you to the Welcome page. Then click on \"Sign up Now\" on the right side of the page.     You will be asked to enter the access code listed below, as well as some personal information. Please follow the directions to create your username and password.     Your access code is: [email or web address]  Expires: 3/18/2018 11:27 AM     Your access code will  in 90 days. If you need help or a new code, please call your [place] clinic or [phone].        Care EveryWhere ID     This is your " "Care EveryWhere ID. This could be used by other organizations to access your [place] medical records  [id number]        Your Vitals Were     Pulse Respirations Height Pulse Oximetry BMI (Body Mass Index)       106 18 5' 10\" (1.778 m) 97% 20.09 kg/m2        Blood Pressure from Last 3 Encounters:   01/25/18 124/82   01/15/18 95/74   01/03/18 129/84    Weight from Last 3 Encounters:   01/25/18 140 lb (63.5 kg)   01/15/18 138 lb (62.6 kg)   01/03/18 137 lb (62.1 kg)              We Performed the Following     CBC with platelets differential        Primary Care Provider Office Phone # Fax #    Jacky Agustin PA-C [phone]       [address] [place] [address]        Equal Access to Services     [facility]: Hadii aad ku hadasho Soomaali, waaxda luqadaha, qaybta kaalmada adeegyada, waxay idiin hayaan adenara kharaiman castillo . So [facility] [phone].    ATENCIÓN: Si habla español, tiene a little disposición servicios gratuitos de asistencia lingüística. Radha al [phone].    We comply with applicable federal civil rights laws and [place] laws. We do not discriminate on the basis of race, color, national origin, age, disability, sex, sexual orientation, or gender identity.            Thank you!     Thank you for choosing [facility]  for your care. Our goal is always to provide you with excellent care. Hearing back from our patients is one way we can continue to improve our services. Please take a few minutes to complete the written survey that you may receive in the mail after your visit with us. Thank you!             Your Updated Medication List - Protect others around you: Learn how to safely use, store and throw away your medicines at www.disposemymeds.org.          This list is accurate as of 1/25/18  4:24 PM.  Always use your most recent med list.                   Brand Name Dispense Instructions for use Diagnosis    Clozapine 200 MG tablet    CLOZARIL    14 tablet    Take 1 " tablet (200 mg) by mouth 2 times daily    Psychosis, unspecified psychosis type       lamoTRIgine 25 MG tablet    LaMICtal     Take 2 tabs daily (50mg)    Schizoaffective disorder, bipolar type (H)       perphenazine 4 MG tablet     30 tablet    Take 1-2 tablets (4-8 mg) by mouth 2 times daily as needed for hallucinations    Schizoaffective disorder, bipolar type (H)

## 2018-01-25 NOTE — PROGRESS NOTES
SUBJECTIVE:   Joseph Ayon Jr is a 22 year old male who presents to clinic today for the following health issues:      Mental Health follow up-discuss medication options today.   Flatter affect today. Denies depression mood. Not suicidal. No fever. Patient feeling well overall. No pressured speech or flight of ideas. Denies profound insomnia. Reports hearing voices.     Establishing care at [facility] . But not till 3/15/18  Problem list and histories reviewed & adjusted, as indicated.  Additional history: as documented    BP Readings from Last 3 Encounters:   01/25/18 124/82   01/15/18 95/74   01/03/18 129/84    Wt Readings from Last 3 Encounters:   01/25/18 140 lb (63.5 kg)   01/15/18 138 lb (62.6 kg)   01/03/18 137 lb (62.1 kg)                    Reviewed and updated as needed this visit by clinical staff  Tobacco  Allergies  Meds       Reviewed and updated as needed this visit by Provider         All other systems negative except as outline above      OBJECTIVE:  Eye exam - right eye normal lid, conjunctiva, cornea, pupil and fundus, left eye normal lid, conjunctiva, cornea, pupil and fundus.  Thyroid not palpable, not enlarged, no nodules detected.  CHEST:chest clear to IPPA, no tachypnea, retractions or cyanosis and S1, S2 normal, no murmur, no gallop, rate regular.      Joseph was seen today for mental health problem.    Diagnoses and all orders for this visit:    High risk medication use    -     CBC with platelets differential    Psychosis, unspecified psychosis type    -     CBC with platelets differential    Bipolar affective disorder, manic, severe (H)    -     CBC with platelets differential      Recheck in 1 week.   Sooner if necessary.

## 2018-01-25 NOTE — TELEPHONE ENCOUNTER
Joseph Ayon Jr was referred to [facility] for clozapine monitoring.     Current Dose: 200mg BID  Laboratory Monitoring:weekly at [facility]    Recent Labs   Lab Test  01/19/18   1230  01/04/18   0845  12/26/17   1605  12/18/17   1052   WBC  6.4  7.9  8.5  6.1   ANEU  3.9  4.6  5.0  3.8   HGB  14.0  14.6  14.8  13.7   PLT  199  204  289  243       Clozapine REMS guidelines recommend ANC monitoring only.  Regular monitoring can continue until ANC<1.5.  ANC WNL. Pt is registered in clozapine REMS under prescriber, Jacky Agustin. Pt will be [place] his girlfriend, [place] ([phone][place] [facility] [place] [facility] for clozapine monitoring for the time being.  Pt will obtain bld draw today in [place] and will be dispensed a 7 day supply of medication if ANC within normal limits.  Next draw and refill  will be 2/1/18.       Rowan Shankar, PharmD, BCPP  Medication Therapy Management Pharmacist  [facility]  [phone]

## 2018-01-25 NOTE — TELEPHONE ENCOUNTER
[facility] completed his certification thru the clozapine REMS program. Their phone # is [phone]. [email or web address].

## 2018-01-26 NOTE — TELEPHONE ENCOUNTER
ANC yesterday was WNL and 7 day supply was dispensed.    Per notes, it appears that pt likely started clozapine during hospitalization in [place] in November 2017.  Confirmation with Clozapine REMS program indicates that first lab results were entered 11/8/17 and appear weekly since that time except for one missed entry ~12/15/17.  Pt eligible to move to Q2 week monitoring on 5/8/18.  Will follow up on 2/1/18.    Recent Labs   Lab Test  01/25/18   1539  01/19/18   1230  01/04/18   0845   WBC  8.6  6.4  7.9   ANEU  5.5  3.9  4.6   HGB  14.1  14.0  14.6   PLT  203  199  204     Jennifer Mcdermott, PharmD  Medication Therapy Management Pharmacist  [facility]  Phone: [phone]  Pager: [phone]

## 2018-01-31 DIAGNOSIS — F29 PSYCHOSIS, UNSPECIFIED PSYCHOSIS TYPE (H): ICD-10-CM

## 2018-01-31 RX ORDER — CLOZAPINE 200 MG/1
200 TABLET ORAL 2 TIMES DAILY
Qty: 14 TABLET | Refills: 0 | Status: CANCELLED | OUTPATIENT
Start: 2018-01-31

## 2018-02-01 ENCOUNTER — TELEPHONE (OUTPATIENT)
Dept: PHARMACY | Facility: CLINIC | Age: 23
End: 2018-02-01

## 2018-02-01 ENCOUNTER — OFFICE VISIT (OUTPATIENT)
Dept: FAMILY MEDICINE | Facility: CLINIC | Age: 23
End: 2018-02-01
Payer: COMMERCIAL

## 2018-02-01 ENCOUNTER — TELEPHONE (OUTPATIENT)
Dept: FAMILY MEDICINE | Facility: CLINIC | Age: 23
End: 2018-02-01

## 2018-02-01 VITALS
RESPIRATION RATE: 16 BRPM | BODY MASS INDEX: 19.47 KG/M2 | DIASTOLIC BLOOD PRESSURE: 82 MMHG | WEIGHT: 136 LBS | HEART RATE: 88 BPM | HEIGHT: 70 IN | OXYGEN SATURATION: 97 % | SYSTOLIC BLOOD PRESSURE: 123 MMHG

## 2018-02-01 DIAGNOSIS — F29 PSYCHOSIS, UNSPECIFIED PSYCHOSIS TYPE (H): ICD-10-CM

## 2018-02-01 DIAGNOSIS — F31.12 BIPOLAR 1 DISORDER, MANIC, MODERATE (H): Primary | ICD-10-CM

## 2018-02-01 DIAGNOSIS — F25.0 SCHIZOAFFECTIVE DISORDER, BIPOLAR TYPE (H): ICD-10-CM

## 2018-02-01 DIAGNOSIS — F20.0 CHRONIC PARANOID SCHIZOPHRENIA (H): ICD-10-CM

## 2018-02-01 LAB
BASOPHILS # BLD AUTO: 0 10E9/L (ref 0–0.2)
BASOPHILS NFR BLD AUTO: 0.2 %
DIFFERENTIAL METHOD BLD: NORMAL
EOSINOPHIL # BLD AUTO: 0.1 10E9/L (ref 0–0.7)
EOSINOPHIL NFR BLD AUTO: 1.2 %
ERYTHROCYTE [DISTWIDTH] IN BLOOD BY AUTOMATED COUNT: 12.6 % (ref 10–15)
HCT VFR BLD AUTO: 45.4 % (ref 40–53)
HGB BLD-MCNC: 15.4 G/DL (ref 13.3–17.7)
LYMPHOCYTES # BLD AUTO: 2 10E9/L (ref 0.8–5.3)
LYMPHOCYTES NFR BLD AUTO: 24.4 %
MCH RBC QN AUTO: 29.3 PG (ref 26.5–33)
MCHC RBC AUTO-ENTMCNC: 33.9 G/DL (ref 31.5–36.5)
MCV RBC AUTO: 87 FL (ref 78–100)
MONOCYTES # BLD AUTO: 0.7 10E9/L (ref 0–1.3)
MONOCYTES NFR BLD AUTO: 9.1 %
NEUTROPHILS # BLD AUTO: 5.2 10E9/L (ref 1.6–8.3)
NEUTROPHILS NFR BLD AUTO: 65.1 %
PLATELET # BLD AUTO: 238 10E9/L (ref 150–450)
RBC # BLD AUTO: 5.25 10E12/L (ref 4.4–5.9)
WBC # BLD AUTO: 8 10E9/L (ref 4–11)

## 2018-02-01 PROCEDURE — 36415 COLL VENOUS BLD VENIPUNCTURE: CPT | Performed by: NURSE PRACTITIONER

## 2018-02-01 PROCEDURE — 99214 OFFICE O/P EST MOD 30 MIN: CPT | Performed by: PHYSICIAN ASSISTANT

## 2018-02-01 PROCEDURE — 85025 COMPLETE CBC W/AUTO DIFF WBC: CPT | Performed by: NURSE PRACTITIONER

## 2018-02-01 RX ORDER — LAMOTRIGINE 25 MG/1
25 TABLET ORAL DAILY
Qty: 30 TABLET | Refills: 1 | Status: SHIPPED | OUTPATIENT
Start: 2018-02-01 | End: 2018-03-19

## 2018-02-01 RX ORDER — CLOZAPINE 100 MG/1
TABLET ORAL
Qty: 21 TABLET | Refills: 0 | Status: SHIPPED | OUTPATIENT
Start: 2018-02-01 | End: 2018-02-16

## 2018-02-01 RX ORDER — CLOZAPINE 100 MG/1
100 TABLET ORAL 2 TIMES DAILY
Qty: 21 TABLET | Refills: 0 | Status: SHIPPED | OUTPATIENT
Start: 2018-02-01 | End: 2018-02-01

## 2018-02-01 NOTE — TELEPHONE ENCOUNTER
Joseph Ayon Jr was referred to [facility] for clozapine monitoring. Per notes, it appears that pt likely started clozapine during hospitalization in [place] in November 2017. Confirmation with Clozapine REMS program indicates that first lab results were entered 11/8/17 and appear weekly since that time except for one missed entry ~12/15/17.    Current Dose: 100mg QAM and 200mg QHS (dose increase 2/1/18)  Laboratory Monitoring:weekly at [facility].  Pt eligible to move to Q2 week monitoring on 5/8/18.    Recent Labs   Lab Test  02/01/18   1400  01/25/18   1539  01/19/18   1230  01/04/18   0845   WBC  8.0  8.6  6.4  7.9   ANEU  5.2  5.5  3.9  4.6   HGB  15.4  14.1  14.0  14.6   PLT  238  203  199  204       Clozapine REMS guidelines recommend ANC monitoring only.  Regular monitoring can continue until ANC<1.5.  ANC WNL.  7 day supply of medication will be dispensed.  Next draw and refill  will be 2/8/18.       Rowan Shankar, PharmD, BCPP  Medication Therapy Management Pharmacist  [facility]  [phone]

## 2018-02-01 NOTE — MR AVS SNAPSHOT
"              After Visit Summary   2018    Joseph yAon Jr    MRN: [id number]           Patient Information     Date Of Birth          1995        Visit Information        Provider Department      2018 1:40 PM Jacky Agustin PA-C [facility] [place]        Today's Diagnoses     Bipolar 1 disorder, manic, moderate (H)    -  1    Psychosis, unspecified psychosis type        Chronic paranoid schizophrenia (H)        Schizoaffective disorder, bipolar type (H)           Follow-ups after your visit        Who to contact     Normal or non-critical lab and imaging results will be communicated to you by JobTalentshart, letter or phone within 4 business days after the clinic has received the results. If you do not hear from us within 7 days, please contact the clinic through JobTalentshart or phone. If you have a critical or abnormal lab result, we will notify you by phone as soon as possible.  Submit refill requests through My[employer] or call your pharmacy and they will forward the refill request to us. Please allow 3 business days for your refill to be completed.          If you need to speak with a  for additional information , please call: [phone]             Additional Information About Your Visit        JobTalentsharMoasis Information     MyWealth lets you send messages to your doctor, view your test results, renew your prescriptions, schedule appointments and more. To sign up, go to [email or web address] . Click on \"Log in\" on the left side of the screen, which will take you to the Welcome page. Then click on \"Sign up Now\" on the right side of the page.     You will be asked to enter the access code listed below, as well as some personal information. Please follow the directions to create your username and password.     Your access code is: [email or web address]  Expires: 3/18/2018 11:27 AM     Your access code will  in 90 days. If you need help or a new code, please call your [place] clinic or " "[phone].        Care EveryWhere ID     This is your Care EveryWhere ID. This could be used by other organizations to access your [place] medical records  [id number]        Your Vitals Were     Pulse Respirations Height Pulse Oximetry BMI (Body Mass Index)       88 16 5' 10\" (1.778 m) 97% 19.51 kg/m2        Blood Pressure from Last 3 Encounters:   02/01/18 123/82   01/25/18 124/82   01/15/18 95/74    Weight from Last 3 Encounters:   02/01/18 136 lb (61.7 kg)   01/25/18 140 lb (63.5 kg)   01/15/18 138 lb (62.6 kg)              We Performed the Following     CBC with platelets and differential          Today's Medication Changes          These changes are accurate as of 2/1/18  2:32 PM.  If you have any questions, ask your nurse or doctor.               These medicines have changed or have updated prescriptions.        Dose/Directions    cloZAPine 100 MG tablet   Commonly known as:  CLOZARIL   This may have changed:    - medication strength  - how much to take  - additional instructions   Used for:  Psychosis, unspecified psychosis type   Changed by:  Jacky Agustin PA-C        Dose:  100 mg   Take 1 tablet (100 mg) by mouth 2 times daily Take one tab in the morning and 2 tabs at night.   Quantity:  21 tablet   Refills:  0       lamoTRIgine 25 MG tablet   Commonly known as:  LaMICtal   This may have changed:    - how much to take  - how to take this  - when to take this  - additional instructions   Used for:  Schizoaffective disorder, bipolar type (H)   Changed by:  Jacky Agustin PA-C        Dose:  25 mg   Take 1 tablet (25 mg) by mouth daily   Quantity:  30 tablet   Refills:  1            Where to get your medicines      These medications were sent to [facility] [place], [place] - [address]  [address] [address]     Phone:  [phone]     cloZAPine 100 MG tablet    lamoTRIgine 25 MG tablet                Primary Care Provider Office Phone # Fax #    " Jacky Agustin PA-C [phone]       [address] [facility] [address]        Equal Access to Services     ANTONIGALLITO ARMAND : Hadii sruthi sinha irlandao Soanastacioali, waaxda luqadaha, qaybta kaalmada adeegyada, dwayne murrietaletty robson. So [facility] [phone].    ATENCIÓN: Si habla español, tiene a little disposición servicios gratuitos de asistencia lingüística. Llame al [phone].    We comply with applicable federal civil rights laws and [place] laws. We do not discriminate on the basis of race, color, national origin, age, disability, sex, sexual orientation, or gender identity.            Thank you!     Thank you for choosing [facility]  for your care. Our goal is always to provide you with excellent care. Hearing back from our patients is one way we can continue to improve our services. Please take a few minutes to complete the written survey that you may receive in the mail after your visit with us. Thank you!             Your Updated Medication List - Protect others around you: Learn how to safely use, store and throw away your medicines at www.disposemymeds.org.          This list is accurate as of 2/1/18  2:32 PM.  Always use your most recent med list.                   Brand Name Dispense Instructions for use Diagnosis    cloZAPine 100 MG tablet    CLOZARIL    21 tablet    Take 1 tablet (100 mg) by mouth 2 times daily Take one tab in the morning and 2 tabs at night.    Psychosis, unspecified psychosis type       lamoTRIgine 25 MG tablet    LaMICtal    30 tablet    Take 1 tablet (25 mg) by mouth daily    Schizoaffective disorder, bipolar type (H)       perphenazine 4 MG tablet     30 tablet    Take 1-2 tablets (4-8 mg) by mouth 2 times daily as needed for hallucinations    Schizoaffective disorder, bipolar type (H)

## 2018-02-01 NOTE — PROGRESS NOTES
SUBJECTIVE:   Joseph Ayon Jr is a 22 year old male who presents to clinic today for the following health issues:      Manic Behavior-discuss medication refill today please        Problem list and histories reviewed & adjusted, as indicated.  Additional history: as documented    BP Readings from Last 3 Encounters:   02/01/18 123/82   01/25/18 124/82   01/15/18 95/74    Wt Readings from Last 3 Encounters:   02/01/18 136 lb (61.7 kg)   01/25/18 140 lb (63.5 kg)   01/15/18 138 lb (62.6 kg)                    Reviewed and updated as needed this visit by clinical staff  Tobacco  Allergies  Meds       Reviewed and updated as needed this visit by Provider         All other systems negative except as outline above  OBJECTIVE:  Eye exam - right eye normal lid, conjunctiva, cornea, pupil and fundus, left eye normal lid, conjunctiva, cornea, pupil and fundus.  Thyroid not palpable, not enlarged, no nodules detected.  CHEST:chest clear to IPPA, no tachypnea, retractions or cyanosis and S1, S2 normal, no murmur, no gallop, rate regular.    Cbc : normal .    Joseph was seen today for manic behavior.    Diagnoses and all orders for this visit:    Bipolar 1 disorder, manic, moderate (H)    Psychosis, unspecified psychosis type  -     Clozapine (CLOZARIL) 100 MG tablet; Take 1 tablet (100 mg) by mouth 2 times daily Take one tab in the morning and 2 tabs at night.    Chronic paranoid schizophrenia (H)  -     CBC with platelets and differential    Schizoaffective disorder, bipolar type (H)  -     lamoTRIgine (LAMICTAL) 25 MG tablet; Take 1 tablet (25 mg) by mouth daily      Recheck in 1 week.

## 2018-02-01 NOTE — TELEPHONE ENCOUNTER
Elida states you put 2 sets of directions for patient's clozepine today and she wants to know which one is correct.  States patient will be there in about 20 minutes to pick it up.  Please call.    Thank you.

## 2018-02-07 DIAGNOSIS — F29 PSYCHOSIS, UNSPECIFIED PSYCHOSIS TYPE (H): ICD-10-CM

## 2018-02-07 RX ORDER — CLOZAPINE 100 MG/1
TABLET ORAL
Qty: 21 TABLET | Refills: 0 | Status: CANCELLED | OUTPATIENT
Start: 2018-02-07

## 2018-02-07 NOTE — TELEPHONE ENCOUNTER
Routing refill request to provider for review/approval because:  Drug not on the [facility] refill protocol, has appt tomorrow, will route to provider.  Also see 2/1/18 Pharm D encounter.  Riya Marcial RN

## 2018-02-07 NOTE — TELEPHONE ENCOUNTER
Last Written Prescription Date:  2-1-18  Last Fill Quantity: 21,  # refills: 0   Last Office Visit with [facility] provider:  2-1-18   Future Office Visit:    Next 5 appointments (look out 90 days)     Feb 08, 2018  2:00 PM CST   SHORT with Jacky Agustin PA-C   [facility] [place] [facility]    [address]  [place] [address]   [phone]

## 2018-02-08 NOTE — TELEPHONE ENCOUNTER
Patient needs to come in for a lab visit to have a cbc assessed before i'll prescribe him more cloziril .

## 2018-02-12 NOTE — TELEPHONE ENCOUNTER
Attempted reaching patient x 2 with no success.  Mobile # in demo tab is disconnected.  Home # VM is full; unable to leave message.  Noted that refill request was initiated to PCP and OV with that provider was missed on 2/8.  Will continue trying to reach patient.    Note routed to PCP Jacky Agustin.    Jennifer Mcdermott, PharmD  Medication Therapy Management Pharmacist  [facility]  Phone: [phone]  Pager: [phone]

## 2018-02-14 NOTE — TELEPHONE ENCOUNTER
"Spoke with patient to follow up on missed blood draw last Thursday, 2/8/18.  Pt last picked up clozapine 100mg #21 on 2/1/18 with instruction to take one tab QAM and 2 tabs QHS.  Pt reported today that he had the understanding he was to take 100mg QHS after OV with Dr. Agustin on 2/1/18 and has been taking 100mg QHS since that time.  He stated that he still has seven 200mg tablets remaining from a previous fill, but that he last took a 100mg dose on Sunday, 2/11/18.    He reported that clozapine makes him \"extremely sedated\" and he sometimes lays in bed all day long.  He made mention that \"some days are worse than others\" and it seemed as though he adjusts his dose based on symptoms, though this was difficult to verify.    Pt stated he missed last week's blood draw and we discussed optional home draws, but girlfriend, who he is staying with in [facility] [place] [facility], declined a phlebotomist visiting the home.  We discussed getting medical rides to [place] for labs and refill , which he plans to set up weekly.  He does have a ride for this Friday, 2/16/18.  Currently no refills on file, which we hope to have available to him after assessment of lab results on Friday in order to minimize missed doses.    Per REMS guidelines, clozapine dose should be retitrated after missing >2 doses.  Per package insert, \"it is recommended that treatment be reinitiated with one-half of a 25-mg  tablet (12.5 mg) once or twice daily. If that dose is well tolerated, it may be feasible to titrate patients back to a therapeutic dose more quickly than is recommended for  initial treatment.\"  It would be reasonable to start back at 25mg QHS and increase by 25-50mg daily until reaching desired dose.    Informed patient that I would call him on Friday to verify that pharmacy has Rx on file before he comes here for labs, to ensure he will have a refill to .  Patient could use his 200mg tablets to achieve a lower dose in the meantime, " but will await guidance from provider.  Writer is not very familiar with patient, so it is unclear how quickly he may decompensate after missed doses.    Will follow up on 2/16 or sooner.  Note routed to NORA Avendano.    Jennifer Mcdermott, PharmD  Medication Therapy Management Pharmacist  [facility]  Phone: [phone]  Pager: [phone]

## 2018-02-15 NOTE — TELEPHONE ENCOUNTER
"Spoke with Rowan at [facility], patient can get a ride to the pharmacy and have his labs done there at the walk in lab. He has a ride arranged for Friday 02/16/18.  Pharmacist is asking Jacky to have a Rx for clozapine on the pharmacy profile for clozapine-dosage may need to be adjusted,see message below.    Per REMS guidelines, clozapine dose should be retitrated after missing >2 doses.  Per package insert, \"it is recommended that treatment be reinitiated with one-half of a 25-mg  tablet (12.5 mg) once or twice daily. If that dose is well tolerated, it may be feasible to titrate patients back to a therapeutic dose more quickly than is recommended for  initial treatment.\"  It would be reasonable to start back at 25mg QHS and increase by 25-50mg daily until reaching desired dose.  "

## 2018-02-15 NOTE — TELEPHONE ENCOUNTER
Spoke with ARIN Alarcon regarding information outlined below.  Requested new Rx that we will place on profile until laboratory work is obtained.  Agnes with communicate message with Jacky Agustin.

## 2018-02-16 ENCOUNTER — TELEPHONE (OUTPATIENT)
Dept: PHARMACY | Facility: CLINIC | Age: 23
End: 2018-02-16

## 2018-02-16 DIAGNOSIS — F20.0 CHRONIC PARANOID SCHIZOPHRENIA (H): ICD-10-CM

## 2018-02-16 LAB
BASOPHILS # BLD AUTO: 0 10E9/L (ref 0–0.2)
BASOPHILS NFR BLD AUTO: 0.3 %
DIFFERENTIAL METHOD BLD: NORMAL
EOSINOPHIL # BLD AUTO: 0 10E9/L (ref 0–0.7)
EOSINOPHIL NFR BLD AUTO: 0.6 %
ERYTHROCYTE [DISTWIDTH] IN BLOOD BY AUTOMATED COUNT: 12.2 % (ref 10–15)
HCT VFR BLD AUTO: 44.2 % (ref 40–53)
HGB BLD-MCNC: 14.6 G/DL (ref 13.3–17.7)
IMM GRANULOCYTES # BLD: 0 10E9/L (ref 0–0.4)
IMM GRANULOCYTES NFR BLD: 0.1 %
LYMPHOCYTES # BLD AUTO: 2.6 10E9/L (ref 0.8–5.3)
LYMPHOCYTES NFR BLD AUTO: 36.4 %
MCH RBC QN AUTO: 28.7 PG (ref 26.5–33)
MCHC RBC AUTO-ENTMCNC: 33 G/DL (ref 31.5–36.5)
MCV RBC AUTO: 87 FL (ref 78–100)
MONOCYTES # BLD AUTO: 0.7 10E9/L (ref 0–1.3)
MONOCYTES NFR BLD AUTO: 9.2 %
NEUTROPHILS # BLD AUTO: 3.9 10E9/L (ref 1.6–8.3)
NEUTROPHILS NFR BLD AUTO: 53.4 %
NRBC # BLD AUTO: 0 10*3/UL
NRBC BLD AUTO-RTO: 0 /100
PLATELET # BLD AUTO: 274 10E9/L (ref 150–450)
RBC # BLD AUTO: 5.09 10E12/L (ref 4.4–5.9)
WBC # BLD AUTO: 7.2 10E9/L (ref 4–11)

## 2018-02-16 PROCEDURE — 85025 COMPLETE CBC W/AUTO DIFF WBC: CPT | Performed by: NURSE PRACTITIONER

## 2018-02-16 PROCEDURE — 36415 COLL VENOUS BLD VENIPUNCTURE: CPT | Performed by: NURSE PRACTITIONER

## 2018-02-16 PROCEDURE — 80159 DRUG ASSAY CLOZAPINE: CPT | Performed by: NURSE PRACTITIONER

## 2018-02-16 RX ORDER — CLOZAPINE 100 MG/1
TABLET ORAL
Qty: 21 TABLET | Refills: 0 | Status: SHIPPED | OUTPATIENT
Start: 2018-02-16 | End: 2018-02-21

## 2018-02-16 RX ORDER — CLOZAPINE 25 MG/1
TABLET ORAL
Qty: 36 TABLET | Refills: 0 | Status: SHIPPED | OUTPATIENT
Start: 2018-02-16 | End: 2018-02-16

## 2018-02-16 NOTE — TELEPHONE ENCOUNTER
Joseph Ayon Jr was referred to [facility] for clozapine monitoring. Per notes, it appears that pt likely started clozapine during hospitalization in [place] in November 2017. Confirmation with Clozapine REMS program indicates that first lab results were entered 11/8/17 and appear weekly since that time except for one missed entry ~12/15/17.    Current Dose: 100mg QAM and 200mg QHS  Laboratory Monitoring: weekly at [facility].  Pt eligible to move to Q2 week monitoring on 5/8/18.    Recent Labs   Lab Test  02/16/18   1244  02/01/18   1400  01/25/18   1539  01/19/18   1230   WBC  7.2  8.0  8.6  6.4   ANEU  3.9  5.2  5.5  3.9   HGB  14.6  15.4  14.1  14.0   PLT  274  238  203  199       Clozapine REMS guidelines recommend ANC monitoring only.  Regular monitoring can continue until ANC<1.5.  ANC WNL.  7 day supply of medication will be dispensed.  Per previous notes, patient had reported missing two days of clozapine and Rx was submitted to [facility].  Upon picking up Rx, patient stated that he had not missed those doses and MD verified ok to continue dosing at 100mg QAM and 200mg QHS.  Next draw and refill  will be 2/22/18, as pt stated he intended to schedule an MD appointment that day and would get labs at the same time, followed by  at [place].     Jennifer Mcdermott, PharmD  Medication Therapy Management Pharmacist  [facility]  Phone: [phone]  Pager: [phone]

## 2018-02-18 LAB
CLOZAPINE AND METABOLITES TOTAL: NORMAL NG/ML
CLOZAPINE SERPL-MCNC: <100 NG/ML
CLOZAPINE-N-OXIDE QUANT: <100 NG/ML
NORCLOZAPINE SERPL-MCNC: <100 NG/ML

## 2018-02-21 DIAGNOSIS — F29 PSYCHOSIS, UNSPECIFIED PSYCHOSIS TYPE (H): ICD-10-CM

## 2018-02-21 NOTE — TELEPHONE ENCOUNTER
Requested Prescriptions   Pending Prescriptions Disp Refills     cloZAPine (CLOZARIL) 100 MG tablet  Last Written Prescription Date:  02/16/18  Last Fill Quantity: 21,  # refills: 0   Last office visit: 2/1/2018 with prescribing provider:  Jacky Agustin   Future Office Visit:     21 tablet 0     Sig: Take one tab in the morning and 2 tabs at night.    There is no refill protocol information for this order

## 2018-02-22 RX ORDER — CLOZAPINE 100 MG/1
TABLET ORAL
Qty: 21 TABLET | Refills: 0 | Status: SHIPPED | OUTPATIENT
Start: 2018-02-22 | End: 2018-02-23

## 2018-02-23 ENCOUNTER — TELEPHONE (OUTPATIENT)
Dept: FAMILY MEDICINE | Facility: CLINIC | Age: 23
End: 2018-02-23

## 2018-02-23 ENCOUNTER — TELEPHONE (OUTPATIENT)
Dept: PHARMACY | Facility: CLINIC | Age: 23
End: 2018-02-23

## 2018-02-23 DIAGNOSIS — F29 PSYCHOSIS, UNSPECIFIED PSYCHOSIS TYPE (H): ICD-10-CM

## 2018-02-23 DIAGNOSIS — F20.0 CHRONIC PARANOID SCHIZOPHRENIA (H): ICD-10-CM

## 2018-02-23 LAB
BASOPHILS # BLD AUTO: 0 10E9/L (ref 0–0.2)
BASOPHILS NFR BLD AUTO: 0.4 %
DIFFERENTIAL METHOD BLD: NORMAL
EOSINOPHIL # BLD AUTO: 0.1 10E9/L (ref 0–0.7)
EOSINOPHIL NFR BLD AUTO: 1.9 %
ERYTHROCYTE [DISTWIDTH] IN BLOOD BY AUTOMATED COUNT: 12.3 % (ref 10–15)
HCT VFR BLD AUTO: 44.4 % (ref 40–53)
HGB BLD-MCNC: 14.7 G/DL (ref 13.3–17.7)
IMM GRANULOCYTES # BLD: 0 10E9/L (ref 0–0.4)
IMM GRANULOCYTES NFR BLD: 0.1 %
LYMPHOCYTES # BLD AUTO: 2.7 10E9/L (ref 0.8–5.3)
LYMPHOCYTES NFR BLD AUTO: 39.3 %
MCH RBC QN AUTO: 29 PG (ref 26.5–33)
MCHC RBC AUTO-ENTMCNC: 33.1 G/DL (ref 31.5–36.5)
MCV RBC AUTO: 88 FL (ref 78–100)
MONOCYTES # BLD AUTO: 0.5 10E9/L (ref 0–1.3)
MONOCYTES NFR BLD AUTO: 7.7 %
NEUTROPHILS # BLD AUTO: 3.5 10E9/L (ref 1.6–8.3)
NEUTROPHILS NFR BLD AUTO: 50.6 %
NRBC # BLD AUTO: 0 10*3/UL
NRBC BLD AUTO-RTO: 0 /100
PLATELET # BLD AUTO: 203 10E9/L (ref 150–450)
RBC # BLD AUTO: 5.07 10E12/L (ref 4.4–5.9)
WBC # BLD AUTO: 6.9 10E9/L (ref 4–11)

## 2018-02-23 PROCEDURE — 80159 DRUG ASSAY CLOZAPINE: CPT | Performed by: NURSE PRACTITIONER

## 2018-02-23 PROCEDURE — 85025 COMPLETE CBC W/AUTO DIFF WBC: CPT | Performed by: NURSE PRACTITIONER

## 2018-02-23 PROCEDURE — 36415 COLL VENOUS BLD VENIPUNCTURE: CPT | Performed by: NURSE PRACTITIONER

## 2018-02-23 RX ORDER — CLOZAPINE 100 MG/1
TABLET ORAL
Qty: 21 TABLET | Refills: 0 | Status: SHIPPED | OUTPATIENT
Start: 2018-02-23 | End: 2018-03-05

## 2018-02-23 NOTE — TELEPHONE ENCOUNTER
Joseph Ayon Jr was referred to [facility] for clozapine monitoring. Per notes, it appears that pt likely started clozapine during hospitalization in [place] in November 2017. Confirmation with Clozapine REMS program indicates that first lab results were entered 11/8/17 and appear weekly since that time except for one missed entry ~12/15/17.    Current Dose: 100mg QAM and 200mg QHS  Laboratory Monitoring: weekly at [facility].  Pt eligible to move to Q2 week monitoring on 5/8/18.    Recent Labs   Lab Test  02/23/18   1434  02/16/18   1244  02/01/18   1400  01/25/18   1539   WBC  6.9  7.2  8.0  8.6   ANEU  3.5  3.9  5.2  5.5   HGB  14.7  14.6  15.4  14.1   PLT  203  274  238  203       Clozapine REMS guidelines recommend ANC monitoring only.  Regular monitoring can continue until ANC<1.5.  ANC WNL.  7 day supply of medication will be dispensed.  Next draw 3/2/18.    Jennifer Mcdermott, PharmD  Medication Therapy Management Pharmacist  [facility]  Phone: [phone]  Pager: [phone]

## 2018-02-23 NOTE — TELEPHONE ENCOUNTER
Girlfriend is calling to request refill RX: cloZAPine (CLOZARIL) 100 MG tablet. Please call to advise. Thank  You.

## 2018-02-23 NOTE — TELEPHONE ENCOUNTER
Spoke with Joseph and his girlfriend Lisa on the phone, as pharmacy expected pt to get labs yesterday.  Lisa reported that they were waiting to hear back from the clinic to verify that a new Rx had been sent so that they didn't come get labs if there were no refills.  Relaying info that pharmacy did receive new 7 day clozapine prescription and provided pharmacy number ([phone]) for her to call in the future.    Pt will come to [place] today for labs and refill  and will plan for Friday labs going forward.    Jennifer Mcdermott, PharmD  Medication Therapy Management Pharmacist  [facility]  Phone: [phone]

## 2018-02-26 LAB
CLOZAPINE AND METABOLITES TOTAL: 179 NG/ML
CLOZAPINE SERPL-MCNC: 179 NG/ML
CLOZAPINE-N-OXIDE QUANT: <100 NG/ML
NORCLOZAPINE SERPL-MCNC: <100 NG/ML

## 2018-03-02 ENCOUNTER — TELEPHONE (OUTPATIENT)
Dept: PHARMACY | Facility: CLINIC | Age: 23
End: 2018-03-02

## 2018-03-02 DIAGNOSIS — F20.0 CHRONIC PARANOID SCHIZOPHRENIA (H): ICD-10-CM

## 2018-03-02 LAB
BASOPHILS # BLD AUTO: 0 10E9/L (ref 0–0.2)
BASOPHILS NFR BLD AUTO: 0.4 %
DIFFERENTIAL METHOD BLD: NORMAL
EOSINOPHIL # BLD AUTO: 0.1 10E9/L (ref 0–0.7)
EOSINOPHIL NFR BLD AUTO: 1.4 %
ERYTHROCYTE [DISTWIDTH] IN BLOOD BY AUTOMATED COUNT: 12.1 % (ref 10–15)
HCT VFR BLD AUTO: 44.5 % (ref 40–53)
HGB BLD-MCNC: 15.1 G/DL (ref 13.3–17.7)
IMM GRANULOCYTES # BLD: 0 10E9/L (ref 0–0.4)
IMM GRANULOCYTES NFR BLD: 0.3 %
LYMPHOCYTES # BLD AUTO: 1.8 10E9/L (ref 0.8–5.3)
LYMPHOCYTES NFR BLD AUTO: 25.7 %
MCH RBC QN AUTO: 29.7 PG (ref 26.5–33)
MCHC RBC AUTO-ENTMCNC: 33.9 G/DL (ref 31.5–36.5)
MCV RBC AUTO: 87 FL (ref 78–100)
MONOCYTES # BLD AUTO: 0.6 10E9/L (ref 0–1.3)
MONOCYTES NFR BLD AUTO: 9.1 %
NEUTROPHILS # BLD AUTO: 4.4 10E9/L (ref 1.6–8.3)
NEUTROPHILS NFR BLD AUTO: 63.1 %
NRBC # BLD AUTO: 0 10*3/UL
NRBC BLD AUTO-RTO: 0 /100
PLATELET # BLD AUTO: 158 10E9/L (ref 150–450)
RBC # BLD AUTO: 5.09 10E12/L (ref 4.4–5.9)
WBC # BLD AUTO: 6.9 10E9/L (ref 4–11)

## 2018-03-02 PROCEDURE — 36415 COLL VENOUS BLD VENIPUNCTURE: CPT | Performed by: NURSE PRACTITIONER

## 2018-03-02 PROCEDURE — 85025 COMPLETE CBC W/AUTO DIFF WBC: CPT | Performed by: NURSE PRACTITIONER

## 2018-03-02 PROCEDURE — 80159 DRUG ASSAY CLOZAPINE: CPT | Performed by: NURSE PRACTITIONER

## 2018-03-02 NOTE — TELEPHONE ENCOUNTER
Joseph Ayon Jr was referred to [facility] for clozapine monitoring. Per notes, it appears that pt likely started clozapine during hospitalization in [place] in November 2017. Confirmation with Clozapine REMS program indicates that first lab results were entered 11/8/17 and appear weekly since that time except for one missed entry ~12/15/17.    Current Dose: 100mg QAM and 200mg QHS  Laboratory Monitoring: weekly at [facility].  Pt eligible to move to Q2 week monitoring on 5/8/18.    Recent Labs   Lab Test  03/02/18   1145  02/23/18   1434  02/16/18   1244  02/01/18   1400   WBC  6.9  6.9  7.2  8.0   ANEU  4.4  3.5  3.9  5.2   HGB  15.1  14.7  14.6  15.4   PLT  158  203  274  238       Clozapine REMS guidelines recommend ANC monitoring only.  Regular monitoring can continue until ANC<1.5.  ANC WNL.  7 day supply of medication will be dispensed.  Next draw 3/9/18.    Jennifer Mcdermott, PharmD  Medication Therapy Management Pharmacist  [facility]  Phone: [phone]  Pager: [phone]

## 2018-03-05 DIAGNOSIS — F29 PSYCHOSIS, UNSPECIFIED PSYCHOSIS TYPE (H): ICD-10-CM

## 2018-03-05 LAB
CLOZAPINE AND METABOLITES TOTAL: 682 NG/ML
CLOZAPINE SERPL-MCNC: 373 NG/ML
CLOZAPINE-N-OXIDE QUANT: 125 NG/ML
NORCLOZAPINE SERPL-MCNC: 184 NG/ML

## 2018-03-05 NOTE — TELEPHONE ENCOUNTER
Patient is now living in [place] and requested home blood draws and mailed refills.  [facility] address verified.  He will have weekly blood draws on Wednesdays.

## 2018-03-05 NOTE — TELEPHONE ENCOUNTER
Requested Prescriptions   Pending Prescriptions Disp Refills     cloZAPine (CLOZARIL) 100 MG tablet 21 tablet 0     Sig: Take one tab in the morning and 2 tabs at night.    There is no refill protocol information for this order      Clozaril      Last Written Prescription Date:  3/2/18  Last Fill Quantity: 21,   # refills: 0  Last Office Visit: 2/1/18 UMAIR Agustin  Future Office visit:       Routing refill request to provider for review/approval because:  Drug not on the [facility], [facility] or [facility] refill protocol or controlled substance

## 2018-03-07 DIAGNOSIS — F20.0 CHRONIC PARANOID SCHIZOPHRENIA (H): ICD-10-CM

## 2018-03-07 LAB
BASOPHILS # BLD AUTO: 0 10E9/L (ref 0–0.2)
BASOPHILS NFR BLD AUTO: 0.3 %
DIFFERENTIAL METHOD BLD: NORMAL
EOSINOPHIL # BLD AUTO: 0.2 10E9/L (ref 0–0.7)
EOSINOPHIL NFR BLD AUTO: 2.2 %
ERYTHROCYTE [DISTWIDTH] IN BLOOD BY AUTOMATED COUNT: 12.4 % (ref 10–15)
HCT VFR BLD AUTO: 43.9 % (ref 40–53)
HGB BLD-MCNC: 15 G/DL (ref 13.3–17.7)
LYMPHOCYTES # BLD AUTO: 2.8 10E9/L (ref 0.8–5.3)
LYMPHOCYTES NFR BLD AUTO: 29.8 %
MCH RBC QN AUTO: 29.2 PG (ref 26.5–33)
MCHC RBC AUTO-ENTMCNC: 34.2 G/DL (ref 31.5–36.5)
MCV RBC AUTO: 86 FL (ref 78–100)
MONOCYTES # BLD AUTO: 0.8 10E9/L (ref 0–1.3)
MONOCYTES NFR BLD AUTO: 8.5 %
NEUTROPHILS # BLD AUTO: 5.5 10E9/L (ref 1.6–8.3)
NEUTROPHILS NFR BLD AUTO: 59.2 %
PLATELET # BLD AUTO: 222 10E9/L (ref 150–450)
RBC # BLD AUTO: 5.13 10E12/L (ref 4.4–5.9)
WBC # BLD AUTO: 9.3 10E9/L (ref 4–11)

## 2018-03-07 PROCEDURE — 85025 COMPLETE CBC W/AUTO DIFF WBC: CPT | Performed by: NURSE PRACTITIONER

## 2018-03-07 PROCEDURE — 36415 COLL VENOUS BLD VENIPUNCTURE: CPT | Performed by: NURSE PRACTITIONER

## 2018-03-07 RX ORDER — CLOZAPINE 100 MG/1
TABLET ORAL
Qty: 21 TABLET | Refills: 0 | Status: SHIPPED | OUTPATIENT
Start: 2018-03-07 | End: 2018-03-09

## 2018-03-08 ENCOUNTER — TELEPHONE (OUTPATIENT)
Dept: PHARMACY | Facility: CLINIC | Age: 23
End: 2018-03-08

## 2018-03-08 NOTE — TELEPHONE ENCOUNTER
Joseph Ayon Jr was referred to [facility] for clozapine monitoring. Per notes, it appears that pt likely started clozapine during hospitalization in [place] in November 2017. Confirmation with Clozapine REMS program indicates that first lab results were entered 11/8/17 and appear weekly since that time except for one missed entry ~12/15/17.    Current Dose: 100mg QAM and 200mg QHS  Laboratory Monitoring: weekly home draws every Wednesday.  Pt eligible to move to Q2 week monitoring on 5/8/18.    Recent Labs   Lab Test  03/07/18   1526  03/02/18   1145  02/23/18   1434  02/16/18   1244   WBC  9.3  6.9  6.9  7.2   ANEU  5.5  4.4  3.5  3.9   HGB  15.0  15.1  14.7  14.6   PLT  222  158  203  274       Clozapine REMS guidelines recommend ANC monitoring only.  Regular monitoring can continue until ANC<1.5.  ANC WNL.  Refill will be mailed to patient.  Next draw 3/14/18.    Rowan Shankar, PharmD, BCPP  Medication Therapy Management Pharmacist  [facility]  [phone]

## 2018-03-09 RX ORDER — CLOZAPINE 100 MG/1
TABLET ORAL
Qty: 21 TABLET | Refills: 0 | Status: SHIPPED | OUTPATIENT
Start: 2018-03-09 | End: 2018-03-14

## 2018-03-14 DIAGNOSIS — F29 PSYCHOSIS, UNSPECIFIED PSYCHOSIS TYPE (H): ICD-10-CM

## 2018-03-15 ENCOUNTER — TRANSFERRED RECORDS (OUTPATIENT)
Dept: HEALTH INFORMATION MANAGEMENT | Facility: CLINIC | Age: 23
End: 2018-03-15

## 2018-03-15 RX ORDER — CLOZAPINE 100 MG/1
TABLET ORAL
Qty: 21 TABLET | Refills: 0 | Status: SHIPPED | OUTPATIENT
Start: 2018-03-15 | End: 2018-03-19

## 2018-03-15 NOTE — TELEPHONE ENCOUNTER
Called pt to discuss missed home draw on 3/14. Spoke with pt. He is currently staying with his girlfriend in [facility], but may be returning home to his mom's house tomorrow. Pt unsure when he will be able to obtain lab draw. Background conversation between pt and girlfriend indicated that girlfriend would bring pt to lab today, however pt seems to be declining this.  Stressed the importance of getting CBC with diff draw and refill so he doesn't run out of medication. Unknown how much medication pt has at home.  Will follow up with pt the morning of 3/16 if labs have not been obtained.     Roawn Shankar, PharmD, BCPP  Medication Therapy Management Pharmacist  [facility]  [phone]

## 2018-03-16 NOTE — TELEPHONE ENCOUNTER
Pt called to report that he and girlfriend broke up and he will be living back in [place] with his mother indefinitely.  Blood draw was missed this week during this transition and pt is unable to get to lab.  He did end up with an extra week of medication due to getting an early refill when medication was mailed during a previous address change between [place] and [place].  Will continue with next scheduled home draw in [place] on 3/21/18 due to patient's inability to get a draw sooner.  Past ANC values have been WNL.  Encouraged patient to continue his medication as prescribed.    He did see a psychiatrist in [place], who changed clozapine dose to be administered all at bedtime (300mg QHS), however patient reported he did not wish to make the change.  He does not plan to return to that provider and hopes his PCP, Dr. Jacky Agustin will be ok prescribing the medication until he can find a new psychiatrist.  Pt does have appointment scheduled with Dr. Agustin on 3/19/18.    Jennifer Mcdermott, PharmD  Medication Therapy Management Pharmacist  [facility]  Phone: [phone]

## 2018-03-19 ENCOUNTER — OFFICE VISIT (OUTPATIENT)
Dept: FAMILY MEDICINE | Facility: CLINIC | Age: 23
End: 2018-03-19
Payer: COMMERCIAL

## 2018-03-19 VITALS
WEIGHT: 144 LBS | SYSTOLIC BLOOD PRESSURE: 136 MMHG | HEART RATE: 98 BPM | TEMPERATURE: 98.5 F | OXYGEN SATURATION: 96 % | RESPIRATION RATE: 18 BRPM | DIASTOLIC BLOOD PRESSURE: 85 MMHG | HEIGHT: 70 IN | BODY MASS INDEX: 20.62 KG/M2

## 2018-03-19 DIAGNOSIS — F29 PSYCHOSIS, UNSPECIFIED PSYCHOSIS TYPE (H): ICD-10-CM

## 2018-03-19 DIAGNOSIS — F25.0 SCHIZOAFFECTIVE DISORDER, BIPOLAR TYPE (H): ICD-10-CM

## 2018-03-19 LAB
BASOPHILS # BLD AUTO: 0 10E9/L (ref 0–0.2)
BASOPHILS NFR BLD AUTO: 0.3 %
DIFFERENTIAL METHOD BLD: NORMAL
EOSINOPHIL # BLD AUTO: 0.2 10E9/L (ref 0–0.7)
EOSINOPHIL NFR BLD AUTO: 1.6 %
ERYTHROCYTE [DISTWIDTH] IN BLOOD BY AUTOMATED COUNT: 12.2 % (ref 10–15)
HCT VFR BLD AUTO: 43.7 % (ref 40–53)
HGB BLD-MCNC: 15 G/DL (ref 13.3–17.7)
LYMPHOCYTES # BLD AUTO: 3.6 10E9/L (ref 0.8–5.3)
LYMPHOCYTES NFR BLD AUTO: 33.3 %
MCH RBC QN AUTO: 29.3 PG (ref 26.5–33)
MCHC RBC AUTO-ENTMCNC: 34.3 G/DL (ref 31.5–36.5)
MCV RBC AUTO: 85 FL (ref 78–100)
MONOCYTES # BLD AUTO: 0.6 10E9/L (ref 0–1.3)
MONOCYTES NFR BLD AUTO: 5.7 %
NEUTROPHILS # BLD AUTO: 6.4 10E9/L (ref 1.6–8.3)
NEUTROPHILS NFR BLD AUTO: 59.1 %
PLATELET # BLD AUTO: 214 10E9/L (ref 150–450)
RBC # BLD AUTO: 5.12 10E12/L (ref 4.4–5.9)
WBC # BLD AUTO: 10.8 10E9/L (ref 4–11)

## 2018-03-19 PROCEDURE — 99213 OFFICE O/P EST LOW 20 MIN: CPT | Performed by: PHYSICIAN ASSISTANT

## 2018-03-19 PROCEDURE — 85025 COMPLETE CBC W/AUTO DIFF WBC: CPT | Performed by: PHYSICIAN ASSISTANT

## 2018-03-19 PROCEDURE — 36415 COLL VENOUS BLD VENIPUNCTURE: CPT | Performed by: PHYSICIAN ASSISTANT

## 2018-03-19 RX ORDER — LAMOTRIGINE 100 MG/1
50 TABLET ORAL DAILY
Qty: 30 TABLET | Refills: 1 | Status: SHIPPED | OUTPATIENT
Start: 2018-03-19 | End: 2022-06-16

## 2018-03-19 RX ORDER — CLOZAPINE 100 MG/1
TABLET ORAL
Qty: 21 TABLET | Refills: 0 | Status: SHIPPED | OUTPATIENT
Start: 2018-03-19 | End: 2018-03-30

## 2018-03-19 NOTE — PROGRESS NOTES
SUBJECTIVE:   Joseph Ayon Jr is a 22 year old male who presents to clinic today for the following health issues:      Behavior Problem-working well with target symptoms. Would like refill today    Tolerating his current dose pretty well now. Seeing psych at [facility].     Noting less voices. Back home living with his mother.   Problem list and histories reviewed & adjusted, as indicated.  Additional history: as documented    BP Readings from Last 3 Encounters:   03/19/18 136/85   02/01/18 123/82   01/25/18 124/82    Wt Readings from Last 3 Encounters:   03/19/18 144 lb (65.3 kg)   02/01/18 136 lb (61.7 kg)   01/25/18 140 lb (63.5 kg)                    Reviewed and updated as needed this visit by clinical staff  Tobacco  Allergies  Meds  Med Hx  Surg Hx  Fam Hx  Soc Hx      Reviewed and updated as needed this visit by Provider         All other systems negative except as outline above  OBJECTIVE:        Eye exam - right eye normal lid, conjunctiva, cornea, pupil and fundus, left eye normal lid, conjunctiva, cornea, pupil and fundus.   Thyroid not palpable, not enlarged, no nodules detected.  CHEST:chest clear to IPPA, no tachypnea, retractions or cyanosis and S1, S2 normal, no murmur, no gallop, rate regular.    Joseph was seen today for manic behavior.    Diagnoses and all orders for this visit:    Psychosis, unspecified psychosis type  -     cloZAPine (CLOZARIL) 100 MG tablet; Take one tab in the morning and 2 tabs at night.  -     CBC with platelets differential    Schizoaffective disorder, bipolar type (H)  -     lamoTRIgine (LAMICTAL) 100 MG tablet; Take 0.5 tablets (50 mg) by mouth daily      Paperwork filled out.   Recheck in 1 wk.

## 2018-03-19 NOTE — MR AVS SNAPSHOT
"              After Visit Summary   3/19/2018    Joseph Ayon Jr    MRN: [id number]           Patient Information     Date Of Birth          1995        Visit Information        Provider Department      3/19/2018 5:40 PM Jacky Agustin PA-C [facility] [place]        Today's Diagnoses     Psychosis, unspecified psychosis type        Schizoaffective disorder, bipolar type (H)           Follow-ups after your visit        Who to contact     Normal or non-critical lab and imaging results will be communicated to you by [employer], letter or phone within 4 business days after the clinic has received the results. If you do not hear from us within 7 days, please contact the clinic through [employer] or phone. If you have a critical or abnormal lab result, we will notify you by phone as soon as possible.  Submit refill requests through [employer] or call your pharmacy and they will forward the refill request to us. Please allow 3 business days for your refill to be completed.          If you need to speak with a  for additional information , please call: [phone]             Additional Information About Your Visit        [facility] Information     [employer] lets you send messages to your doctor, view your test results, renew your prescriptions, schedule appointments and more. To sign up, go to [email or web address] . Click on \"Log in\" on the left side of the screen, which will take you to the Welcome page. Then click on \"Sign up Now\" on the right side of the page.     You will be asked to enter the access code listed below, as well as some personal information. Please follow the directions to create your username and password.     Your access code is: [email or web address]  Expires: 2018  5:46 PM     Your access code will  in 90 days. If you need help or a new code, please call your [place] clinic or [phone].        Care EveryWhere ID     This is your Care EveryWhere ID. This could be used " "by other organizations to access your [place] medical records  [id number]        Your Vitals Were     Pulse Temperature Respirations Height Pulse Oximetry BMI (Body Mass Index)    98 98.5  F (36.9  C) (Tympanic) 18 5' 10\" (1.778 m) 96% 20.66 kg/m2       Blood Pressure from Last 3 Encounters:   03/19/18 136/85   02/01/18 123/82   01/25/18 124/82    Weight from Last 3 Encounters:   03/19/18 144 lb (65.3 kg)   02/01/18 136 lb (61.7 kg)   01/25/18 140 lb (63.5 kg)              We Performed the Following     CBC with platelets differential          Today's Medication Changes          These changes are accurate as of 3/19/18  5:46 PM.  If you have any questions, ask your nurse or doctor.               These medicines have changed or have updated prescriptions.        Dose/Directions    lamoTRIgine 100 MG tablet   Commonly known as:  LaMICtal   This may have changed:    - medication strength  - how much to take   Used for:  Schizoaffective disorder, bipolar type (H)   Changed by:  Jacky Agustin PA-C        Dose:  50 mg   Take 0.5 tablets (50 mg) by mouth daily   Quantity:  30 tablet   Refills:  1            Where to get your medicines      These medications were sent to [facility] [place], [place] - [address]  [address] [address]     Phone:  [phone]     cloZAPine 100 MG tablet    lamoTRIgine 100 MG tablet                Primary Care Provider Office Phone # Fax #    Jacky Agustin PA-C [phone]       [address]  [place] [address]        Equal Access to Services     [facility]: Hadii sruthi fink Sokhanh, waaxda luqadaha, qaybta kaalmada adeegyada, dwayne castillo . So [facility] [phone].    ATENCIÓN: Si habla español, tiene a little disposición servicios gratuitos de asistencia lingüística. Llame al [phone].    We comply with applicable federal civil rights laws and [place] laws. We do not discriminate " on the basis of race, color, national origin, age, disability, sex, sexual orientation, or gender identity.            Thank you!     Thank you for choosing [facility]  for your care. Our goal is always to provide you with excellent care. Hearing back from our patients is one way we can continue to improve our services. Please take a few minutes to complete the written survey that you may receive in the mail after your visit with us. Thank you!             Your Updated Medication List - Protect others around you: Learn how to safely use, store and throw away your medicines at www.disposemymeds.org.          This list is accurate as of 3/19/18  5:46 PM.  Always use your most recent med list.                   Brand Name Dispense Instructions for use Diagnosis    cloZAPine 100 MG tablet    CLOZARIL    21 tablet    Take one tab in the morning and 2 tabs at night.    Psychosis, unspecified psychosis type       lamoTRIgine 100 MG tablet    LaMICtal    30 tablet    Take 0.5 tablets (50 mg) by mouth daily    Schizoaffective disorder, bipolar type (H)       perphenazine 4 MG tablet     30 tablet    Take 1-2 tablets (4-8 mg) by mouth 2 times daily as needed for hallucinations    Schizoaffective disorder, bipolar type (H)

## 2018-03-21 ENCOUNTER — TELEPHONE (OUTPATIENT)
Dept: PHARMACY | Facility: CLINIC | Age: 23
End: 2018-03-21

## 2018-03-21 NOTE — TELEPHONE ENCOUNTER
Joseph Ayon Jr was referred to [facility] for clozapine monitoring. Per notes, it appears that pt likely started clozapine during hospitalization in [place] in November 2017. Confirmation with Clozapine REMS program indicates that first lab results were entered 11/8/17 and appear weekly since that time except for one missed entry ~12/15/17.    Current Dose: 100mg QAM and 200mg QHS  Laboratory Monitoring: weekly home draws every Wednesday.  Pt eligible to move to Q2 week monitoring on 5/8/18.    Recent Labs   Lab Test  03/19/18   1747  03/07/18   1526  03/02/18   1145  02/23/18   1434   WBC  10.8  9.3  6.9  6.9   ANEU  6.4  5.5  4.4  3.5   HGB  15.0  15.0  15.1  14.7   PLT  214  222  158  203       Clozapine REMS guidelines recommend ANC monitoring only.  Regular monitoring can continue until ANC<1.5.  ANC WNL.  Refill mailed to patient.  Next draw 3/28/18.    Jennifer Mcdermott, PharmD  Medication Therapy Management Pharmacist  [facility]  Phone: [phone]

## 2018-03-22 NOTE — PROGRESS NOTES
Request for medical opinion faxed to [facility] and sent to [facility].

## 2018-03-26 DIAGNOSIS — F20.0 CHRONIC PARANOID SCHIZOPHRENIA (H): ICD-10-CM

## 2018-03-26 LAB
BASOPHILS # BLD AUTO: 0 10E9/L (ref 0–0.2)
BASOPHILS NFR BLD AUTO: 0.5 %
DIFFERENTIAL METHOD BLD: NORMAL
EOSINOPHIL # BLD AUTO: 0.1 10E9/L (ref 0–0.7)
EOSINOPHIL NFR BLD AUTO: 1.8 %
ERYTHROCYTE [DISTWIDTH] IN BLOOD BY AUTOMATED COUNT: 12.4 % (ref 10–15)
HCT VFR BLD AUTO: 42.2 % (ref 40–53)
HGB BLD-MCNC: 14.4 G/DL (ref 13.3–17.7)
LYMPHOCYTES # BLD AUTO: 2.9 10E9/L (ref 0.8–5.3)
LYMPHOCYTES NFR BLD AUTO: 43.6 %
MCH RBC QN AUTO: 29.3 PG (ref 26.5–33)
MCHC RBC AUTO-ENTMCNC: 34.1 G/DL (ref 31.5–36.5)
MCV RBC AUTO: 86 FL (ref 78–100)
MONOCYTES # BLD AUTO: 0.6 10E9/L (ref 0–1.3)
MONOCYTES NFR BLD AUTO: 9.7 %
NEUTROPHILS # BLD AUTO: 2.9 10E9/L (ref 1.6–8.3)
NEUTROPHILS NFR BLD AUTO: 44.4 %
PLATELET # BLD AUTO: 211 10E9/L (ref 150–450)
RBC # BLD AUTO: 4.92 10E12/L (ref 4.4–5.9)
WBC # BLD AUTO: 6.6 10E9/L (ref 4–11)

## 2018-03-26 PROCEDURE — 99000 SPECIMEN HANDLING OFFICE-LAB: CPT | Performed by: NURSE PRACTITIONER

## 2018-03-26 PROCEDURE — 36415 COLL VENOUS BLD VENIPUNCTURE: CPT | Performed by: NURSE PRACTITIONER

## 2018-03-26 PROCEDURE — 80159 DRUG ASSAY CLOZAPINE: CPT | Mod: 90 | Performed by: NURSE PRACTITIONER

## 2018-03-26 PROCEDURE — 85025 COMPLETE CBC W/AUTO DIFF WBC: CPT | Performed by: NURSE PRACTITIONER

## 2018-03-27 ENCOUNTER — TELEPHONE (OUTPATIENT)
Dept: PHARMACY | Facility: CLINIC | Age: 23
End: 2018-03-27

## 2018-03-27 NOTE — TELEPHONE ENCOUNTER
Joseph Ayon Jr was referred to [facility] for clozapine monitoring. Per notes, it appears that pt likely started clozapine during hospitalization in [place] in November 2017. Confirmation with Clozapine REMS program indicates that first lab results were entered 11/8/17 and appear weekly since that time except for one missed entry ~12/15/17.    Current Dose: 100mg QAM and 200mg QHS  Laboratory Monitoring: weekly draws every Monday.  Pt eligible to move to Q2 week monitoring on 5/8/18.    Recent Labs   Lab Test  03/26/18   1746  03/19/18   1747  03/07/18   1526  03/02/18   1145   WBC  6.6  10.8  9.3  6.9   ANEU  2.9  6.4  5.5  4.4   HGB  14.4  15.0  15.0  15.1   PLT  211  214  222  158       Clozapine REMS guidelines recommend ANC monitoring only.  Regular monitoring can continue until ANC<1.5.  ANC WNL.  Called and spoke with pt regarding clozapine monitoring plan. Pt reports he will be living with his mother in [place] for the foreseeable future and would prefer to get labs drawn at [facility] as opposed to home draws. Pt will get weekly labs on Mondays with medication mailout to [place].  Next draw and refill 4/2/18.    Rowan Shankar, PharmD, BCPP  Medication Therapy Management Pharmacist  [facility]  [phone]

## 2018-03-30 DIAGNOSIS — F29 PSYCHOSIS, UNSPECIFIED PSYCHOSIS TYPE (H): ICD-10-CM

## 2018-03-31 RX ORDER — CLOZAPINE 100 MG/1
TABLET ORAL
Qty: 21 TABLET | Refills: 0 | Status: SHIPPED | OUTPATIENT
Start: 2018-03-31 | End: 2018-04-06

## 2018-04-02 DIAGNOSIS — F20.0 CHRONIC PARANOID SCHIZOPHRENIA (H): ICD-10-CM

## 2018-04-02 LAB
BASOPHILS # BLD AUTO: 0 10E9/L (ref 0–0.2)
BASOPHILS NFR BLD AUTO: 0.4 %
DIFFERENTIAL METHOD BLD: NORMAL
EOSINOPHIL # BLD AUTO: 0.1 10E9/L (ref 0–0.7)
EOSINOPHIL NFR BLD AUTO: 1.2 %
ERYTHROCYTE [DISTWIDTH] IN BLOOD BY AUTOMATED COUNT: 12.7 % (ref 10–15)
HCT VFR BLD AUTO: 40.7 % (ref 40–53)
HGB BLD-MCNC: 13.8 G/DL (ref 13.3–17.7)
LYMPHOCYTES # BLD AUTO: 1.9 10E9/L (ref 0.8–5.3)
LYMPHOCYTES NFR BLD AUTO: 27.3 %
MCH RBC QN AUTO: 29.1 PG (ref 26.5–33)
MCHC RBC AUTO-ENTMCNC: 33.9 G/DL (ref 31.5–36.5)
MCV RBC AUTO: 86 FL (ref 78–100)
MONOCYTES # BLD AUTO: 0.7 10E9/L (ref 0–1.3)
MONOCYTES NFR BLD AUTO: 10.9 %
NEUTROPHILS # BLD AUTO: 4.1 10E9/L (ref 1.6–8.3)
NEUTROPHILS NFR BLD AUTO: 60.2 %
PLATELET # BLD AUTO: 202 10E9/L (ref 150–450)
RBC # BLD AUTO: 4.74 10E12/L (ref 4.4–5.9)
WBC # BLD AUTO: 6.8 10E9/L (ref 4–11)

## 2018-04-02 PROCEDURE — 99000 SPECIMEN HANDLING OFFICE-LAB: CPT | Performed by: NURSE PRACTITIONER

## 2018-04-02 PROCEDURE — 36415 COLL VENOUS BLD VENIPUNCTURE: CPT | Performed by: NURSE PRACTITIONER

## 2018-04-02 PROCEDURE — 80159 DRUG ASSAY CLOZAPINE: CPT | Mod: 90 | Performed by: NURSE PRACTITIONER

## 2018-04-02 PROCEDURE — 85025 COMPLETE CBC W/AUTO DIFF WBC: CPT | Performed by: NURSE PRACTITIONER

## 2018-04-03 ENCOUNTER — TELEPHONE (OUTPATIENT)
Dept: PHARMACY | Facility: CLINIC | Age: 23
End: 2018-04-03

## 2018-04-03 NOTE — TELEPHONE ENCOUNTER
Joseph Ayon Jr was referred to [facility] for clozapine monitoring. Per notes, it appears that pt likely started clozapine during hospitalization in [place] in November 2017. Confirmation with Clozapine REMS program indicates that first lab results were entered 11/8/17 and appear weekly since that time except for one missed entry ~12/15/17.    Current Dose: 100mg QAM and 200mg QHS  Laboratory Monitoring: weekly draws every Monday at [facility].  Pt eligible to move to Q2 week monitoring on 5/8/18.    Recent Labs   Lab Test  04/02/18   1723  03/26/18   1746  03/19/18   1747  03/07/18   1526   WBC  6.8  6.6  10.8  9.3   ANEU  4.1  2.9  6.4  5.5   HGB  13.8  14.4  15.0  15.0   PLT  202  211  214  222       Clozapine REMS guidelines recommend ANC monitoring only.  Regular monitoring can continue until ANC<1.5.  ANC WNL.  Next draw and refill 4/9/18.    Rowan Shankar, PharmD, BCPP  Medication Therapy Management Pharmacist  [facility]  [phone]

## 2018-04-06 DIAGNOSIS — F29 PSYCHOSIS, UNSPECIFIED PSYCHOSIS TYPE (H): ICD-10-CM

## 2018-04-06 RX ORDER — CLOZAPINE 100 MG/1
TABLET ORAL
Qty: 21 TABLET | Refills: 0 | Status: SHIPPED | OUTPATIENT
Start: 2018-04-06 | End: 2018-04-17

## 2018-04-09 ENCOUNTER — TELEPHONE (OUTPATIENT)
Dept: FAMILY MEDICINE | Facility: CLINIC | Age: 23
End: 2018-04-09

## 2018-04-09 DIAGNOSIS — F29 PSYCHOSIS, UNSPECIFIED PSYCHOSIS TYPE (H): Primary | ICD-10-CM

## 2018-04-09 LAB
BASOPHILS # BLD AUTO: 0 10E9/L (ref 0–0.2)
BASOPHILS NFR BLD AUTO: 0.5 %
DIFFERENTIAL METHOD BLD: NORMAL
EOSINOPHIL # BLD AUTO: 0.1 10E9/L (ref 0–0.7)
EOSINOPHIL NFR BLD AUTO: 1.4 %
ERYTHROCYTE [DISTWIDTH] IN BLOOD BY AUTOMATED COUNT: 12.8 % (ref 10–15)
HCT VFR BLD AUTO: 42.6 % (ref 40–53)
HGB BLD-MCNC: 14.5 G/DL (ref 13.3–17.7)
LYMPHOCYTES # BLD AUTO: 2.6 10E9/L (ref 0.8–5.3)
LYMPHOCYTES NFR BLD AUTO: 44.6 %
MCH RBC QN AUTO: 28.9 PG (ref 26.5–33)
MCHC RBC AUTO-ENTMCNC: 34 G/DL (ref 31.5–36.5)
MCV RBC AUTO: 85 FL (ref 78–100)
MONOCYTES # BLD AUTO: 0.5 10E9/L (ref 0–1.3)
MONOCYTES NFR BLD AUTO: 8.5 %
NEUTROPHILS # BLD AUTO: 2.7 10E9/L (ref 1.6–8.3)
NEUTROPHILS NFR BLD AUTO: 45 %
PLATELET # BLD AUTO: 209 10E9/L (ref 150–450)
RBC # BLD AUTO: 5.02 10E12/L (ref 4.4–5.9)
WBC # BLD AUTO: 5.9 10E9/L (ref 4–11)

## 2018-04-09 PROCEDURE — 85025 COMPLETE CBC W/AUTO DIFF WBC: CPT | Performed by: PHYSICIAN ASSISTANT

## 2018-04-09 PROCEDURE — 80159 DRUG ASSAY CLOZAPINE: CPT | Mod: 90 | Performed by: PHYSICIAN ASSISTANT

## 2018-04-09 PROCEDURE — 99000 SPECIMEN HANDLING OFFICE-LAB: CPT | Performed by: PHYSICIAN ASSISTANT

## 2018-04-09 PROCEDURE — 36415 COLL VENOUS BLD VENIPUNCTURE: CPT | Performed by: PHYSICIAN ASSISTANT

## 2018-04-09 NOTE — TELEPHONE ENCOUNTER
Please see 04/03/18 encounter: Rowan Shankar, [facility]        4/3/18 9:32 AM        Joseph Ayon Jr was referred to [facility] for clozapine monitoring. Per notes, it appears that pt likely started clozapine during hospitalization in [place] in November 2017. Confirmation with Clozapine REMS program indicates that first lab results were entered 11/8/17 and appear weekly since that time except for one missed entry ~12/15/17.     Current Dose: 100mg QAM and 200mg QHS  Laboratory Monitoring: weekly draws every Monday at [facility].  Pt eligible to move to Q2 week monitoring on 5/8/18.

## 2018-04-09 NOTE — TELEPHONE ENCOUNTER
Patient is calling stated that he need to do his labs and needs order for the lab.   Please call to discuss  Thank you

## 2018-04-10 ENCOUNTER — TELEPHONE (OUTPATIENT)
Dept: PHARMACY | Facility: CLINIC | Age: 23
End: 2018-04-10

## 2018-04-10 NOTE — TELEPHONE ENCOUNTER
Joseph Ayon Jr was referred to [facility] for clozapine monitoring. Per notes, it appears that pt likely started clozapine during hospitalization in [place] in November 2017. Confirmation with Clozapine REMS program indicates that first lab results were entered 11/8/17 and appear weekly since that time except for one missed entry ~12/15/17.    Current Dose: 100mg QAM and 200mg QHS  Laboratory Monitoring: weekly draws every Monday at [facility].  Pt eligible to move to Q2 week monitoring on 5/8/18.    Recent Labs   Lab Test  04/09/18   1701  04/02/18   1723  03/26/18   1746  03/19/18   1747   WBC  5.9  6.8  6.6  10.8   ANEU  2.7  4.1  2.9  6.4   HGB  14.5  13.8  14.4  15.0   PLT  209  202  211  214       Clozapine REMS guidelines recommend ANC monitoring only.  Regular monitoring can continue until ANC<1.5.  ANC WNL.  Next draw and refill 4/16/18.    Of note, pt reported on 4/9 that he is moving and his medications can be mailed to the following address until further notice:     [address] [address]     Rowan Shankar, PharmD, BCPP  Medication Therapy Management Pharmacist  [facility]  [phone]

## 2018-04-12 LAB
CLOZAPINE AND METABOLITES TOTAL: 200 NG/ML
CLOZAPINE SERPL-MCNC: 200 NG/ML
CLOZAPINE-N-OXIDE QUANT: <100 NG/ML
NORCLOZAPINE SERPL-MCNC: <100 NG/ML

## 2018-04-16 DIAGNOSIS — F29 PSYCHOSIS, UNSPECIFIED PSYCHOSIS TYPE (H): ICD-10-CM

## 2018-04-16 LAB
BASOPHILS # BLD AUTO: 0 10E9/L (ref 0–0.2)
BASOPHILS NFR BLD AUTO: 0.3 %
DIFFERENTIAL METHOD BLD: NORMAL
EOSINOPHIL # BLD AUTO: 0.1 10E9/L (ref 0–0.7)
EOSINOPHIL NFR BLD AUTO: 1.6 %
ERYTHROCYTE [DISTWIDTH] IN BLOOD BY AUTOMATED COUNT: 12.7 % (ref 10–15)
HCT VFR BLD AUTO: 43.7 % (ref 40–53)
HGB BLD-MCNC: 14.9 G/DL (ref 13.3–17.7)
LYMPHOCYTES # BLD AUTO: 2.6 10E9/L (ref 0.8–5.3)
LYMPHOCYTES NFR BLD AUTO: 35.8 %
MCH RBC QN AUTO: 28.9 PG (ref 26.5–33)
MCHC RBC AUTO-ENTMCNC: 34.1 G/DL (ref 31.5–36.5)
MCV RBC AUTO: 85 FL (ref 78–100)
MONOCYTES # BLD AUTO: 0.6 10E9/L (ref 0–1.3)
MONOCYTES NFR BLD AUTO: 7.5 %
NEUTROPHILS # BLD AUTO: 4.1 10E9/L (ref 1.6–8.3)
NEUTROPHILS NFR BLD AUTO: 54.8 %
PLATELET # BLD AUTO: 237 10E9/L (ref 150–450)
RBC # BLD AUTO: 5.15 10E12/L (ref 4.4–5.9)
WBC # BLD AUTO: 7.4 10E9/L (ref 4–11)

## 2018-04-16 PROCEDURE — 85025 COMPLETE CBC W/AUTO DIFF WBC: CPT | Performed by: PHYSICIAN ASSISTANT

## 2018-04-16 PROCEDURE — 99000 SPECIMEN HANDLING OFFICE-LAB: CPT | Performed by: PHYSICIAN ASSISTANT

## 2018-04-16 PROCEDURE — 80159 DRUG ASSAY CLOZAPINE: CPT | Mod: 90 | Performed by: PHYSICIAN ASSISTANT

## 2018-04-16 PROCEDURE — 36415 COLL VENOUS BLD VENIPUNCTURE: CPT | Performed by: PHYSICIAN ASSISTANT

## 2018-04-17 ENCOUNTER — TELEPHONE (OUTPATIENT)
Dept: PHARMACY | Facility: CLINIC | Age: 23
End: 2018-04-17

## 2018-04-17 DIAGNOSIS — F29 PSYCHOSIS, UNSPECIFIED PSYCHOSIS TYPE (H): ICD-10-CM

## 2018-04-17 RX ORDER — CLOZAPINE 100 MG/1
TABLET ORAL
Qty: 21 TABLET | Refills: 0 | Status: SHIPPED | OUTPATIENT
Start: 2018-04-17 | End: 2018-04-20

## 2018-04-17 NOTE — TELEPHONE ENCOUNTER
Joseph Ayon Jr was referred to [facility] for clozapine monitoring. Per notes, it appears that pt likely started clozapine during hospitalization in [place] in November 2017. Confirmation with Clozapine REMS program indicates that first lab results were entered 11/8/17 and appear weekly since that time except for one missed entry ~12/15/17.    Current Dose: 100mg QAM and 200mg QHS  Laboratory Monitoring: weekly draws every Monday at [facility].  Pt eligible to move to Q2 week monitoring on 5/8/18.    Recent Labs   Lab Test  04/16/18   1719  04/09/18   1701  04/02/18   1723  03/26/18   1746   WBC  7.4  5.9  6.8  6.6   ANEU  4.1  2.7  4.1  2.9   HGB  14.9  14.5  13.8  14.4   PLT  237  209  202  211       Clozapine REMS guidelines recommend ANC monitoring only.  Regular monitoring can continue until ANC<1.5.  ANC WNL.  Next draw and refill 4/23/18.    Of note, pt reported on 4/9 that he is moving and his medications can be mailed to the following address until further notice:     [address] [address]     Rowan Shankar, PharmD, BCPP  Medication Therapy Management Pharmacist  [facility]  [phone]

## 2018-04-20 DIAGNOSIS — F29 PSYCHOSIS, UNSPECIFIED PSYCHOSIS TYPE (H): ICD-10-CM

## 2018-04-25 RX ORDER — CLOZAPINE 100 MG/1
TABLET ORAL
Qty: 21 TABLET | Refills: 0 | Status: SHIPPED | OUTPATIENT
Start: 2018-04-25 | End: 2022-06-16

## 2018-04-27 NOTE — TELEPHONE ENCOUNTER
Attempted reaching patient regarding overdue blood draw, but voicemail box still full.  Last mailed 7 day supply on 4/17/18, so patient is likely out of clozapine.    Routed to Dr. Agustin as palmira.

## 2018-05-07 NOTE — TELEPHONE ENCOUNTER
Received prescriptions for haldol and clozapine from Dr. Nitin Merino at [facility]. Called to inquire about hospitalization/discharge plan for pt. Spoke with ARIN Cr, who discussed with Dr. Merino. Prescriptions were sent to us in error. Pt will be discharging from [facility] to [facility] in [facility]. Duration of stay is unknown.     Will remove patient from clozapine monitoring at this time and resume when patient is discharged from [facility] if clozapine has been continued.     Rowan Shankar, PharmD, BCPP  Medication Therapy Management Pharmacist  [facility]  [phone]

## 2019-01-11 DIAGNOSIS — F29 PSYCHOSIS, UNSPECIFIED PSYCHOSIS TYPE (H): ICD-10-CM

## 2019-01-11 RX ORDER — CLOZAPINE 100 MG/1
TABLET ORAL
Qty: 21 TABLET | Refills: 0 | Status: CANCELLED | OUTPATIENT
Start: 2019-01-11

## 2019-01-11 NOTE — TELEPHONE ENCOUNTER
Patient wanting refill on cloZAPine (CLOZARIL) 100 MG tablet. States he is not doing very well right now and is out of this medication. States would like this refill to go to any pharmacy in [place]. Please call to discuss ASAP./Joann Steward,

## 2019-01-11 NOTE — TELEPHONE ENCOUNTER
Routing refill request to provider for review/approval because:  Drug not on the [facility] refill protocol   [facility] pharmacy pended   Patient has not been seen by provider since 3/19/2018    Please advise on refill  Raquel Cheng RN, BSN, PHN

## 2019-01-14 NOTE — TELEPHONE ENCOUNTER
I will not refill this for him at this time. He needs to establish with someone down in [place] (specifically a provider that can prescribe clozaril and continue to monitor his blood counts while taking clozaril )

## 2019-03-02 ENCOUNTER — HOSPITAL ENCOUNTER (EMERGENCY)
Facility: OTHER | Age: 24
Discharge: OTHER INSTITUTION | End: 2019-03-02
Attending: EMERGENCY MEDICINE | Admitting: EMERGENCY MEDICINE
Payer: MEDICARE

## 2019-03-02 VITALS
TEMPERATURE: 98 F | HEIGHT: 68 IN | OXYGEN SATURATION: 95 % | DIASTOLIC BLOOD PRESSURE: 68 MMHG | SYSTOLIC BLOOD PRESSURE: 112 MMHG | BODY MASS INDEX: 21.9 KG/M2 | RESPIRATION RATE: 20 BRPM

## 2019-03-02 DIAGNOSIS — R45.850 HOMICIDAL IDEATION: ICD-10-CM

## 2019-03-02 DIAGNOSIS — F29 PSYCHOSIS, UNSPECIFIED PSYCHOSIS TYPE (H): ICD-10-CM

## 2019-03-02 LAB
ALBUMIN SERPL-MCNC: 4.9 G/DL (ref 3.5–5.7)
ALBUMIN UR-MCNC: ABNORMAL MG/DL
ALP SERPL-CCNC: 71 U/L (ref 34–104)
ALT SERPL W P-5'-P-CCNC: 6 U/L (ref 7–52)
AMPHETAMINES UR QL SCN: NOT DETECTED
ANION GAP SERPL CALCULATED.3IONS-SCNC: 11 MMOL/L (ref 3–14)
APAP SERPL-MCNC: <0.2 UG/ML (ref 0–30)
APPEARANCE UR: CLEAR
AST SERPL W P-5'-P-CCNC: 18 U/L (ref 13–39)
BARBITURATES UR QL: NOT DETECTED
BASOPHILS # BLD AUTO: 0 10E9/L (ref 0–0.2)
BASOPHILS NFR BLD AUTO: 0.5 %
BENZODIAZ UR QL: NOT DETECTED
BILIRUB SERPL-MCNC: 0.9 MG/DL (ref 0.3–1)
BILIRUB UR QL STRIP: NEGATIVE
BUN SERPL-MCNC: 6 MG/DL (ref 7–25)
BUPRENORPHINE UR QL: NOT DETECTED NG/ML
CALCIUM SERPL-MCNC: 9.6 MG/DL (ref 8.6–10.3)
CANNABINOIDS UR QL: ABNORMAL NG/ML
CHLORIDE SERPL-SCNC: 101 MMOL/L (ref 98–107)
CO2 SERPL-SCNC: 20 MMOL/L (ref 21–31)
COCAINE UR QL: NOT DETECTED
COLOR UR AUTO: YELLOW
CREAT SERPL-MCNC: 0.87 MG/DL (ref 0.7–1.3)
D-METHAMPHET UR QL: NOT DETECTED NG/ML
DIFFERENTIAL METHOD BLD: NORMAL
EOSINOPHIL # BLD AUTO: 0 10E9/L (ref 0–0.7)
EOSINOPHIL NFR BLD AUTO: 0.1 %
ERYTHROCYTE [DISTWIDTH] IN BLOOD BY AUTOMATED COUNT: 12.5 % (ref 10–15)
ETHANOL SERPL-MCNC: <0.01 %
GFR SERPL CREATININE-BSD FRML MDRD: >90 ML/MIN/{1.73_M2}
GLUCOSE SERPL-MCNC: 96 MG/DL (ref 70–105)
GLUCOSE UR STRIP-MCNC: NEGATIVE MG/DL
HCT VFR BLD AUTO: 41.8 % (ref 40–53)
HGB BLD-MCNC: 14.4 G/DL (ref 13.3–17.7)
HGB UR QL STRIP: NEGATIVE
IMM GRANULOCYTES # BLD: 0 10E9/L (ref 0–0.4)
IMM GRANULOCYTES NFR BLD: 0.1 %
KETONES UR STRIP-MCNC: ABNORMAL MG/DL
LEUKOCYTE ESTERASE UR QL STRIP: NEGATIVE
LYMPHOCYTES # BLD AUTO: 2.1 10E9/L (ref 0.8–5.3)
LYMPHOCYTES NFR BLD AUTO: 27.2 %
MCH RBC QN AUTO: 28.5 PG (ref 26.5–33)
MCHC RBC AUTO-ENTMCNC: 34.4 G/DL (ref 31.5–36.5)
MCV RBC AUTO: 83 FL (ref 78–100)
METHADONE UR QL SCN: NOT DETECTED
MONOCYTES # BLD AUTO: 0.7 10E9/L (ref 0–1.3)
MONOCYTES NFR BLD AUTO: 8.9 %
NEUTROPHILS # BLD AUTO: 4.9 10E9/L (ref 1.6–8.3)
NEUTROPHILS NFR BLD AUTO: 63.2 %
NITRATE UR QL: NEGATIVE
OPIATES UR QL SCN: NOT DETECTED
OXYCODONE UR QL: NOT DETECTED NG/ML
PCP UR QL SCN: NOT DETECTED
PH UR STRIP: 6 PH (ref 5–9)
PLATELET # BLD AUTO: 189 10E9/L (ref 150–450)
POTASSIUM SERPL-SCNC: 3.3 MMOL/L (ref 3.5–5.1)
PROPOXYPH UR QL: NOT DETECTED NG/ML
PROT SERPL-MCNC: 7.7 G/DL (ref 6.4–8.9)
RBC # BLD AUTO: 5.05 10E12/L (ref 4.4–5.9)
RBC #/AREA URNS AUTO: NORMAL /HPF
SODIUM SERPL-SCNC: 132 MMOL/L (ref 134–144)
SOURCE: ABNORMAL
SP GR UR STRIP: 1.01 (ref 1–1.03)
TRICYCLICS UR QL SCN: NOT DETECTED NG/ML
UROBILINOGEN UR STRIP-ACNC: 0.2 EU/DL (ref 0.2–1)
WBC # BLD AUTO: 7.8 10E9/L (ref 4–11)
WBC #/AREA URNS AUTO: NORMAL /HPF

## 2019-03-02 PROCEDURE — 25000128 H RX IP 250 OP 636: Performed by: FAMILY MEDICINE

## 2019-03-02 PROCEDURE — 85025 COMPLETE CBC W/AUTO DIFF WBC: CPT | Performed by: EMERGENCY MEDICINE

## 2019-03-02 PROCEDURE — 36415 COLL VENOUS BLD VENIPUNCTURE: CPT | Performed by: EMERGENCY MEDICINE

## 2019-03-02 PROCEDURE — 80307 DRUG TEST PRSMV CHEM ANLYZR: CPT | Performed by: EMERGENCY MEDICINE

## 2019-03-02 PROCEDURE — 99285 EMERGENCY DEPT VISIT HI MDM: CPT | Mod: 25 | Performed by: EMERGENCY MEDICINE

## 2019-03-02 PROCEDURE — 99285 EMERGENCY DEPT VISIT HI MDM: CPT | Mod: Z6 | Performed by: EMERGENCY MEDICINE

## 2019-03-02 PROCEDURE — 80352 CANNABINOID SYNTHETIC 7/MORE: CPT | Performed by: EMERGENCY MEDICINE

## 2019-03-02 PROCEDURE — 80320 DRUG SCREEN QUANTALCOHOLS: CPT | Mod: GZ | Performed by: EMERGENCY MEDICINE

## 2019-03-02 PROCEDURE — 80307 DRUG TEST PRSMV CHEM ANLYZR: CPT | Mod: 91 | Performed by: EMERGENCY MEDICINE

## 2019-03-02 PROCEDURE — 80053 COMPREHEN METABOLIC PANEL: CPT | Performed by: EMERGENCY MEDICINE

## 2019-03-02 PROCEDURE — 81001 URINALYSIS AUTO W/SCOPE: CPT | Mod: XU | Performed by: EMERGENCY MEDICINE

## 2019-03-02 PROCEDURE — 25000132 ZZH RX MED GY IP 250 OP 250 PS 637: Performed by: FAMILY MEDICINE

## 2019-03-02 PROCEDURE — 96372 THER/PROPH/DIAG INJ SC/IM: CPT | Performed by: EMERGENCY MEDICINE

## 2019-03-02 PROCEDURE — 80329 ANALGESICS NON-OPIOID 1 OR 2: CPT | Performed by: EMERGENCY MEDICINE

## 2019-03-02 RX ORDER — LORAZEPAM 2 MG/ML
2 INJECTION INTRAMUSCULAR ONCE
Status: COMPLETED | OUTPATIENT
Start: 2019-03-02 | End: 2019-03-02

## 2019-03-02 RX ORDER — DIPHENHYDRAMINE HYDROCHLORIDE 50 MG/ML
50 INJECTION INTRAMUSCULAR; INTRAVENOUS ONCE
Status: COMPLETED | OUTPATIENT
Start: 2019-03-02 | End: 2019-03-02

## 2019-03-02 RX ORDER — HALOPERIDOL 5 MG/ML
5 INJECTION INTRAMUSCULAR ONCE
Status: COMPLETED | OUTPATIENT
Start: 2019-03-02 | End: 2019-03-02

## 2019-03-02 RX ADMIN — HALOPERIDOL LACTATE 5 MG: 5 INJECTION, SOLUTION INTRAMUSCULAR at 07:05

## 2019-03-02 RX ADMIN — LORAZEPAM 2 MG: 2 INJECTION INTRAMUSCULAR; INTRAVENOUS at 07:05

## 2019-03-02 RX ADMIN — NICOTINE POLACRILEX 4 MG: 2 GUM, CHEWING BUCCAL at 22:48

## 2019-03-02 RX ADMIN — DIPHENHYDRAMINE HYDROCHLORIDE 50 MG: 50 INJECTION INTRAMUSCULAR; INTRAVENOUS at 07:05

## 2019-03-02 NOTE — ED TRIAGE NOTES
"Patient brought in by [employer], per officer patient had been at [employer] all night.   Patient was going in and out attempting to use JAKE, card had gotten stuck in machine after several attempts and patient had told  to call police stated \" I have drugs in my car I have been set up.\"   Patient yelling and delusional upon arrival, unable to state who he is or where he was from.   Patient kept repeating \" I know tupac.\"  Patient needing to be held down by 4 officers.  Patient yelling, is unable to be redirected.   "

## 2019-03-02 NOTE — ED NOTES
CRT went in to talk to pt again and he refused to open his eyes and talk with her. He is currently sleeping. Sitter at bedside.

## 2019-03-02 NOTE — ED NOTES
"When asked where he is from, pt reports, \"all over [place] and I live at the . I play basketball during the day.\" when asked where he lives, pt states, \"No where. I don't know.\" and he \"doesn't know\" where he goes at night.   Pt started to talk about \"why did the  say that? I came up here to find my father. I am part of [employer]. I'm going to give biggie these shoes.\" Pt does not answer all questions appropriately. Denies alcohol use, reports he smokes pot and that's it.   "

## 2019-03-02 NOTE — ED NOTES
"Pt continues to yell at staff and talk about [employer] and [employer].   When asked where he is from pt said, \"all over [place].\" Pt said he doesn't know whats going on, and he was told he was at the gas station having problems with his credit card. Pt told someone to call the police, \"Someone who is a leader. I can quit smoking any day. 5 years .Then I will give these shoes to faviola\". Pt is sitting upright on cot. Non distressed otherwise, breathing non labored. Skin pink and dry and intact. Pt will not allow blood pressure to be taken at this time. Offered fluids.   "

## 2019-03-02 NOTE — ED NOTES
"Pt is unable to be redirected by staff or police. Pt is yelling about shooting people and being part of \"[employer]?\" Pt is asking to be put to sleep. Pt said he was going to pull out a weapon, talking about shooting the place up. Pt has tangential speech, yelling and swearing. Pt placed in 4 point restraints. Pt told that when he calms down he can be taken out of restraints. Pt said he will never calm down.   "

## 2019-03-02 NOTE — ED TRIAGE NOTES
Pt comes into the ER with police from [employer]. Pt was escorted to 910, pt is yelling and swearing at police and hitting the cot.

## 2019-03-02 NOTE — ED AVS SNAPSHOT
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  03/02/19 0719 Resulting lab:  [facility]    Specimen Information    Type Source Collected On   Blood   03/02/19 0730          Components    Component Value Reference Range Flag Lab   Ethanol g/dL <0.01 <0.01 %   [facility]            Acetaminophen GH [[id number]]  Resulted: 03/02/19 0759, Result status: Final result   Ordering provider:  Harley Dodge MD  03/02/19 0719 Resulting lab:  [facility] AND \[facility] [place] [facility]\""    Specimen Information    Type Source Collected On   Blood   03/02/19 0730          Components    Component Value Reference Range Flag Lab   Acetaminophen <0.2 0.0 - 30.0 ug/mL   [facility]            CBC with platelets differential [[id number]]  Resulted: 03/02/19 0738, Result status: Final result   Ordering provider:  Harley Dodge MD  03/02/19 0712 Resulting lab:   [facility]    Specimen Information    Type Source Collected On   Blood   03/02/19 0725          Components    Component Value Reference Range Flag Lab   WBC 7.8 4.0 - 11.0 10e9/L   [facility]   RBC Count 5.05 4.4 - 5.9 10e12/L   [facility]   Hemoglobin 14.4 13.3 - 17.7 g/dL   [facility]   Hematocrit 41.8 40.0 - 53.0 %   [facility]   MCV 83 78 - 100 fl   [facility]   MCH 28.5 26.5 - 33.0 pg   [facility]   MCHC 34.4 31.5 - 36.5 g/dL   [facility]   RDW 12.5 10.0 - 15.0 %   [facility]   Platelet Count 189 150 - 450 10e9/L   [facility]   Diff Method Automated Method     [facility]   % Neutrophils 63.2 %   [facility]   % Lymphocytes 27.2 %   [facility]   % Monocytes 8.9 %   [facility]   % Eosinophils 0.1 %   [facility]   % Basophils 0.5 %   [facility]   % Immature Granulocytes 0.1 %   [facility]   Absolute Neutrophil 4.9 1.6 - 8.3 10e9/L   [facility]   Absolute Lymphocytes 2.1 0.8 - 5.3 10e9/L   [facility]   Absolute Monocytes 0.7 0.0 - 1.3 10e9/L   [facility]   Absolute Eosinophils 0.0 0.0 - 0.7 10e9/L   [facility]   Absolute Basophils 0.0 0.0 - 0.2 10e9/L   [facility]   Abs Immature Granulocytes 0.0 0 - 0.4 10e9/L   [facility]            Testing Performed By     Lab - Abbreviation Name Director Address Valid Date Range    1743 - [facility] Unknown [address]  [facility] [address] 08/07/15 0948 - Present            Encounter-Level Documents:    There are no encounter-level documents.     Order-Level Documents:    There are no order-level documents.

## 2019-03-02 NOTE — ED NOTES
Pt woken up. He is not sure where he is. Pt orientated to surroundings. Lunch ordered for pt. Sitter in getting a urine sample. Pt is calm, quiet.
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
## 2019-03-03 NOTE — ED NOTES
"72 hour hold placed on pt. Unit is on lock down.   Pt given the copy of his hold, he reported, \"the struggle is real\".   Pt is laying quietly on cot.   "

## 2019-03-03 NOTE — ED NOTES
Patient awake in room, patient was asked to change into blue scrubs for safety.  Patient became aggressive yelling at staff, was threatening to hit nurse and security.  Patient did not attempt to hit staff, did change into scrubs and is now laying in bed.  Patient offered food and fluids at this time patient refused.   Clothing removed from room and placed in garage locker.

## 2019-03-09 LAB — SYNTHETIC CANNABINOID METABOLITES UR: NORMAL

## 2019-03-13 LAB
CATHINONE [PRESENCE] IN URINE BY SCREEN METHOD: NEGATIVE
METHCATHINONE [PRESENCE] IN URINE BY SCREEN METHOD: NEGATIVE
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
## 2021-08-11 ENCOUNTER — HOSPITAL ENCOUNTER (EMERGENCY)
Facility: HOSPITAL | Age: 26
Discharge: HOME | End: 2021-08-12
Attending: EMERGENCY MEDICINE | Admitting: EMERGENCY MEDICINE
Payer: MEDICARE

## 2021-08-11 VITALS
WEIGHT: 135 LBS | OXYGEN SATURATION: 98 % | BODY MASS INDEX: 20.53 KG/M2 | RESPIRATION RATE: 16 BRPM | DIASTOLIC BLOOD PRESSURE: 72 MMHG | HEART RATE: 88 BPM | SYSTOLIC BLOOD PRESSURE: 104 MMHG | TEMPERATURE: 97 F

## 2021-08-11 DIAGNOSIS — F99 PSYCHIATRIC COMPLAINT: ICD-10-CM

## 2021-08-11 PROCEDURE — 99282 EMERGENCY DEPT VISIT SF MDM: CPT

## 2021-08-11 ASSESSMENT — ENCOUNTER SYMPTOMS: HALLUCINATIONS: 0

## 2021-08-12 NOTE — ED TRIAGE NOTES
Pt comes in by self. Pt requesting to be put on a 72 hour hold. Pt states that he needs help for mental health but can give no specific symptom that he needs help with. Pt denies being suicidal or homicidal. Pt states that he is taking all his medications as prescribed.

## 2021-08-12 NOTE — ED PROVIDER NOTES
EMERGENCY DEPARTMENT ENCOUNTER     NAME: Joseph Ayon Jr   AGE: 25 year old male   YOB: 1995   MRN: [id number]   EVALUATION DATE & TIME: 8/11/2021 10:24 PM   PCP: Jacky Agustin     Chief Complaint   Patient presents with     Crisis Eval   :    FINAL IMPRESSION       1. Psychiatric complaint           ED COURSE & MEDICAL DECISION MAKING      10:32 PM I met with the patient for the initial interview and physical examination. Discussed plan for treatment and workup in the ED. PPE: Provider wore eye protection, N95 & surgical mask and gloves.     Pertinent Labs & Imaging studies reviewed. (See chart for details)   25 year old male  presents to the Emergency Department for evaluation of requesting a crisis consult.  Patient had somewhat tangential speech but was not suicidal, homicidal, exhibiting hallucinations. Initial Vitals Reviewed. Initial exam notable for patient that was otherwise calm and cooperative.  I do see he has a history of psychosis in the past, but fortunately he is generally well-appearing tonight.  I placed a crisis consult, but while waiting for this the patient's mother arrived, stated that she wanted to take him to [place] instead where they know him best, and because I did not otherwise have any reason that I can make him holdable staff let them leave AMA at this time.           At the conclusion of the encounter I discussed the results of all of the tests and the disposition. The questions were answered. The patient or family acknowledged understanding and was agreeable with the care plan.         MEDICATIONS GIVEN IN THE EMERGENCY:   Medications - No data to display   NEW PRESCRIPTIONS STARTED AT TODAY'S ER VISIT   New Prescriptions    No medications on file     ================================================================   HISTORY OF PRESENT ILLNESS       Patient information was obtained from: Patient    Use of Intrepreter: N/A     Joseph Ayon Jr is a 25 year old  male with history of substance abuse, bipolar affective disorder, and psychosis who presents requesting to be placed on a 72-hour hold.     Patient states that he needs to be placed on a 72-hour hold. He does not provide any reason for this request. He denies hallucinations or suicidal ideation. Patient then adds that he has a Lisfranc fracture.       ================================================================    REVIEW OF SYSTEMS       Review of Systems   Psychiatric/Behavioral: Negative for hallucinations and suicidal ideas.   All other systems reviewed and are negative.        PAST HISTORY     PAST MEDICAL HISTORY:   History reviewed. No pertinent past medical history.   PAST SURGICAL HISTORY:   Past Surgical History:   Procedure Laterality Date     NO HISTORY OF SURGERY        CURRENT MEDICATIONS:   cloZAPine (CLOZARIL) 100 MG tablet  lamoTRIgine (LAMICTAL) 100 MG tablet  perphenazine 4 MG tablet      ALLERGIES:   No Known Allergies   FAMILY HISTORY:   Family History   Problem Relation Age of Onset     Neurologic Disorder Maternal Grandmother       SOCIAL HISTORY:   Social History     Socioeconomic History     Marital status: Single     Spouse name: Not on file     Number of children: Not on file     Years of education: Not on file     Highest education level: Not on file   Occupational History     Not on file   Tobacco Use     Smoking status: Current Every Day Smoker     Packs/day: 1.00     Years: 9.00     Pack years: 9.00     Types: Cigarettes     Smokeless tobacco: Never Used   Substance and Sexual Activity     Alcohol use: Yes     Comment: rarely     Drug use: Yes     Types: Marijuana     Sexual activity: Not on file     Comment: won't comment   Other Topics Concern     Parent/sibling w/ CABG, MI or angioplasty before 65F 55M? No   Social History Narrative    Patient will not discuss. Said he didn't graduate High school.     Social Determinants of Health     Financial Resource Strain:      Difficulty of  Paying Living Expenses:    Food Insecurity:      Worried About Running Out of Food in the Last Year:      Ran Out of Food in the Last Year:    Transportation Needs:      Lack of Transportation (Medical):      Lack of Transportation (Non-Medical):    Physical Activity:      Days of Exercise per Week:      Minutes of Exercise per Session:    Stress:      Feeling of Stress :    Social Connections:      Frequency of Communication with Friends and Family:      Frequency of Social Gatherings with Friends and Family:      Attends Synagogue Services:      Active Member of Clubs or Organizations:      Attends Club or Organization Meetings:      Marital Status:    Intimate Partner Violence:      Fear of Current or Ex-Partner:      Emotionally Abused:      Physically Abused:      Sexually Abused:         VITALS  Patient Vitals for the past 24 hrs:   BP Temp Temp src Pulse Resp SpO2 Weight   08/11/21 2052 104/72 97  F (36.1  C) Oral 88 16 98 % 61.2 kg (135 lb)        ================================================================    PHYSICAL EXAM     VITAL SIGNS: /72   Pulse 88   Temp 97  F (36.1  C) (Oral)   Resp 16   Wt 61.2 kg (135 lb)   SpO2 98%   BMI 20.53 kg/m     Constitutional:  Awake, no acute distress   HENT:  Atraumatic, oropharynx without exudate or erythema, membranes moist  Lymph:  No adenopathy  Eyes: EOM intact, PERRL, no injection  Neck: Supple  Respiratory:  Clear to auscultation bilaterally, no wheezes or crackles   Cardiovascular:  Regular rate and rhythm, single S1 and S2   GI:  Soft, nontender, nondistended, no rebound or guarding   Musculoskeletal:  Moves all extremities, no lower extremity edema, no deformities    Skin:  Warm, dry  Neurologic:  Alert and oriented x3, no focal deficits noted   Psych: Somewhat tangential speech. No SI, HI, or AVH.       ================================================================  LAB       All pertinent labs reviewed and interpreted.   Labs Ordered and  Resulted from Time of ED Arrival Up to the Time of Departure from the ED - No data to display     ===============================================================  RADIOLOGY       Reviewed all pertinent imaging. Please see official radiology report.   No orders to display         ================================================================  EKG         I have independently reviewed and interpreted the EKG(s) documented above.     ================================================================  PROCEDURES         I, Amee Gibson, am serving as a scribe to document services personally performed by Dr. Keita based on my observation and the provider's statements to me. I, Aminta Keita MD attest that Amee Gibson is acting in a scribe capacity, has observed my performance of the services and has documented them in accordance with my direction.   Aminta Keita M.D.   Emergency Medicine   [facility] EMERGENCY DEPARTMENT  [address] [place] [id number]  [phone]  Dept: [phone]      Aminta Keita MD  08/12/21 0009

## 2021-08-12 NOTE — ED NOTES
RN agrees with triage note. Denies suicidal/homicidal ideation, denies other complaints. Requesting inpatient treatment. See prrovider notes for further assessment details.

## 2021-08-12 NOTE — ED NOTES
Patient's mother here and patient out at desk stating that his mother is taking him home.Mother states that patient normally goes to [facility] and she will take him there if needed.Refused to wait and talk with EDMD.Per EDMD patient is not holdable.Left ambulatory.

## 2022-06-15 ENCOUNTER — HOSPITAL ENCOUNTER (OUTPATIENT)
Facility: CLINIC | Age: 27
Setting detail: OBSERVATION
Discharge: HOME | End: 2022-06-16
Attending: EMERGENCY MEDICINE | Admitting: PSYCHIATRY & NEUROLOGY
Payer: MEDICARE

## 2022-06-15 DIAGNOSIS — F25.0 SCHIZOAFFECTIVE DISORDER, BIPOLAR TYPE (H): ICD-10-CM

## 2022-06-15 DIAGNOSIS — G25.9 EXTRAPYRAMIDAL SYNDROME: ICD-10-CM

## 2022-06-15 DIAGNOSIS — R44.0 AUDITORY HALLUCINATIONS: ICD-10-CM

## 2022-06-15 LAB
AMPHETAMINES UR QL SCN: ABNORMAL
BARBITURATES UR QL: ABNORMAL
BENZODIAZ UR QL: ABNORMAL
CANNABINOIDS UR QL SCN: ABNORMAL
COCAINE UR QL: ABNORMAL
OPIATES UR QL SCN: ABNORMAL
PCP UR QL SCN: ABNORMAL
SARS-COV-2 RNA RESP QL NAA+PROBE: NEGATIVE

## 2022-06-15 PROCEDURE — 80307 DRUG TEST PRSMV CHEM ANLYZR: CPT | Performed by: EMERGENCY MEDICINE

## 2022-06-15 PROCEDURE — U0005 INFEC AGEN DETEC AMPLI PROBE: HCPCS | Performed by: EMERGENCY MEDICINE

## 2022-06-15 PROCEDURE — 250N000013 HC RX MED GY IP 250 OP 250 PS 637: Performed by: EMERGENCY MEDICINE

## 2022-06-15 PROCEDURE — 99285 EMERGENCY DEPT VISIT HI MDM: CPT | Mod: 25

## 2022-06-15 PROCEDURE — C9803 HOPD COVID-19 SPEC COLLECT: HCPCS

## 2022-06-15 RX ORDER — DIPHENHYDRAMINE HCL 25 MG
25-50 CAPSULE ORAL EVERY 6 HOURS PRN
Status: DISCONTINUED | OUTPATIENT
Start: 2022-06-15 | End: 2022-06-16 | Stop reason: HOSPADM

## 2022-06-15 RX ADMIN — DIPHENHYDRAMINE HYDROCHLORIDE 50 MG: 25 CAPSULE ORAL at 22:40

## 2022-06-15 ASSESSMENT — ENCOUNTER SYMPTOMS: HALLUCINATIONS: 1

## 2022-06-16 VITALS
TEMPERATURE: 98.3 F | WEIGHT: 135 LBS | DIASTOLIC BLOOD PRESSURE: 82 MMHG | RESPIRATION RATE: 16 BRPM | HEART RATE: 71 BPM | BODY MASS INDEX: 21.19 KG/M2 | HEIGHT: 67 IN | SYSTOLIC BLOOD PRESSURE: 115 MMHG | OXYGEN SATURATION: 98 %

## 2022-06-16 PROBLEM — R44.0 AUDITORY HALLUCINATIONS: Status: ACTIVE | Noted: 2022-06-16

## 2022-06-16 PROBLEM — F31.2 BIPOLAR AFFECTIVE DISORDER, CURRENTLY MANIC, SEVERE, WITH PSYCHOTIC FEATURES (H): Status: ACTIVE | Noted: 2022-06-16

## 2022-06-16 PROCEDURE — 250N000013 HC RX MED GY IP 250 OP 250 PS 637: Performed by: PSYCHIATRY & NEUROLOGY

## 2022-06-16 PROCEDURE — G0378 HOSPITAL OBSERVATION PER HR: HCPCS

## 2022-06-16 PROCEDURE — 90791 PSYCH DIAGNOSTIC EVALUATION: CPT

## 2022-06-16 RX ORDER — ACETAMINOPHEN 325 MG/1
650 TABLET ORAL EVERY 4 HOURS PRN
Status: DISCONTINUED | OUTPATIENT
Start: 2022-06-16 | End: 2022-06-16 | Stop reason: HOSPADM

## 2022-06-16 RX ORDER — HALOPERIDOL DECANOATE 50 MG/ML
50 INJECTION INTRAMUSCULAR
Status: ON HOLD | COMMUNITY
End: 2023-08-07

## 2022-06-16 RX ORDER — TRAZODONE HYDROCHLORIDE 50 MG/1
50 TABLET, FILM COATED ORAL
Status: DISCONTINUED | OUTPATIENT
Start: 2022-06-16 | End: 2022-06-16 | Stop reason: HOSPADM

## 2022-06-16 RX ORDER — BENZTROPINE MESYLATE 1 MG/1
1 TABLET ORAL 3 TIMES DAILY
Qty: 90 TABLET | Refills: 0 | Status: ON HOLD | OUTPATIENT
Start: 2022-06-16 | End: 2023-08-07

## 2022-06-16 RX ORDER — GABAPENTIN 400 MG/1
400 CAPSULE ORAL 3 TIMES DAILY
Status: ON HOLD | COMMUNITY
End: 2023-08-07

## 2022-06-16 RX ORDER — HYDROXYZINE PAMOATE 25 MG/1
50 CAPSULE ORAL 3 TIMES DAILY PRN
COMMUNITY
End: 2024-06-28

## 2022-06-16 RX ORDER — CLONIDINE HYDROCHLORIDE 0.1 MG/1
0.05 TABLET ORAL EVERY MORNING
Status: ON HOLD | COMMUNITY
End: 2023-08-07

## 2022-06-16 RX ORDER — OLANZAPINE 10 MG/2ML
10 INJECTION, POWDER, FOR SOLUTION INTRAMUSCULAR 2 TIMES DAILY PRN
Status: DISCONTINUED | OUTPATIENT
Start: 2022-06-16 | End: 2022-06-16 | Stop reason: HOSPADM

## 2022-06-16 RX ORDER — BENZTROPINE MESYLATE 1 MG/1
1 TABLET ORAL 2 TIMES DAILY
COMMUNITY
End: 2022-06-16

## 2022-06-16 RX ORDER — HYDROXYZINE HYDROCHLORIDE 50 MG/1
50 TABLET, FILM COATED ORAL EVERY 6 HOURS PRN
Status: DISCONTINUED | OUTPATIENT
Start: 2022-06-16 | End: 2022-06-16 | Stop reason: HOSPADM

## 2022-06-16 RX ORDER — OLANZAPINE 20 MG/1
20 TABLET ORAL AT BEDTIME
Status: ON HOLD | COMMUNITY
End: 2023-08-07

## 2022-06-16 RX ORDER — CLONIDINE HYDROCHLORIDE 0.1 MG/1
0.1 TABLET ORAL AT BEDTIME
Status: ON HOLD | COMMUNITY
End: 2023-08-07

## 2022-06-16 RX ORDER — ONDANSETRON 4 MG/1
4 TABLET, ORALLY DISINTEGRATING ORAL EVERY 6 HOURS PRN
Status: DISCONTINUED | OUTPATIENT
Start: 2022-06-16 | End: 2022-06-16 | Stop reason: HOSPADM

## 2022-06-16 RX ORDER — OLANZAPINE 10 MG/1
10 TABLET ORAL 2 TIMES DAILY
Status: ON HOLD | COMMUNITY
End: 2023-08-18

## 2022-06-16 RX ORDER — OLANZAPINE 10 MG/1
10 TABLET, ORALLY DISINTEGRATING ORAL 2 TIMES DAILY PRN
Status: DISCONTINUED | OUTPATIENT
Start: 2022-06-16 | End: 2022-06-16 | Stop reason: HOSPADM

## 2022-06-16 RX ORDER — GABAPENTIN 400 MG/1
400 CAPSULE ORAL 3 TIMES DAILY PRN
Status: DISCONTINUED | OUTPATIENT
Start: 2022-06-16 | End: 2022-06-16 | Stop reason: HOSPADM

## 2022-06-16 RX ORDER — LANOLIN ALCOHOL/MO/W.PET/CERES
3 CREAM (GRAM) TOPICAL
Status: DISCONTINUED | OUTPATIENT
Start: 2022-06-16 | End: 2022-06-16 | Stop reason: HOSPADM

## 2022-06-16 RX ORDER — DIVALPROEX SODIUM 250 MG/1
1250 TABLET, EXTENDED RELEASE ORAL AT BEDTIME
Status: ON HOLD | COMMUNITY
End: 2023-08-18

## 2022-06-16 RX ORDER — HALOPERIDOL 10 MG/1
10 TABLET ORAL 2 TIMES DAILY
Status: ON HOLD | COMMUNITY
End: 2023-08-18

## 2022-06-16 RX ADMIN — NICOTINE POLACRILEX 4 MG: 4 GUM, CHEWING BUCCAL at 11:26

## 2022-06-16 RX ADMIN — HYDROXYZINE HYDROCHLORIDE 50 MG: 50 TABLET, FILM COATED ORAL at 11:27

## 2022-06-16 RX ADMIN — NICOTINE POLACRILEX 4 MG: 4 GUM, CHEWING BUCCAL at 09:36

## 2022-06-16 NOTE — ED NOTES
Bed: ED17  Expected date: 6/15/22  Expected time: 7:50 PM  Means of arrival: Ambulance  Comments:  HEMS 411 26M hallucinating

## 2022-06-16 NOTE — ED TRIAGE NOTES
[facility]  ED Arrival Note      Means of Arrival: EMS  Comes from: Home- Treatment Facility    Story: Pt is 26 year old male brought in by EMS from treatment facility for Auditory and Visual Hallucination. Pt states he has been experiencing this for for the last 50 days but the hallucinations have increased today. Pt requested to be brought in to hospital for further treatment.         EMS/PD Interventions: N/A  EMS Medications: N/A    Meets Stroke Criteria? No  Meets Trauma Criteria? No  Shock Index: N/A      Directed to: /  Belongings: Sent home with:

## 2022-06-16 NOTE — CONSULTS
TELEPSYCHIATRY TELEPHONE NOTE    Discussed with nurse Painter.  26-year-old male presented with psychosis.  Patient has been medically cleared.  PMH significant for lumbago.  NKDA.  No known serious substance withdrawal reactions.      Vital signs: AVSS. Lab findings: COVID negative. Urine drug screen: +cannabis. Alcohol level: nil.     PLAN:    --Disposition: Hold patient in ED for reassessment in AM.  Routine PRN orders written.    --Precautions: Suicide.  --Psychiatric medications: Hold.    --Medical medications: Hold.          Rodney Melton MD  Psychiatrist

## 2022-06-16 NOTE — ED NOTES
The following information was received from Archie whose relationship to the patient is staff @ [facility]. Information was obtained via phone. Their phone number is # [phone] and they last had contact with patient on today.       How long have they been a resident: Joseph has been residing at [facility] since April 25th.    Why does patient live in the facility: He's here for his mental health & he has history of meth & THC use.    Significant changes to environment: Joseph hasn't been coming down to eat dinner, not acting himself. I'm his favorite staff & I can usually get him to laugh but not today. He's not as social.    Legal status (Commitment, probation, guardian, etc.): Pt is under a Mental Health commitment    Has the patient made any comments about wanting to kill themselves/others: no    What happened today: Joseph told us he has been experiencing hallucinations & hearing things for past couple days. This is not typical for him.     What is different about patient's functioning: He's not going to groups this time. He stayed here before & would attend.     Concern about alcohol/drug use: No We don't think he would use while he's here.    If d/c is recommended, can patient return to current living situation: Yes.    If no, what needs to happen in order for patient to return: NA    Additional information:

## 2022-06-16 NOTE — ED NOTES
Pt has been discharge pending  by [facility] . Pt denies any more side effects from medications. Cogentin has been prescribed and is expected to be delivered from [facility].

## 2022-06-16 NOTE — DISCHARGE INSTRUCTIONS
"  Aftercare Plan  If I am feeling unsafe or I am in a crisis, I will:   Contact my established care providers   Call the National Suicide Prevention Lifeline: 942.250.1741   Go to the nearest emergency room   Call 911     Warning signs that I or other people might notice when a crisis is developing for me: shutting eyes, isolation  Things I am able to do on my own to cope or help me feel better: walk, listen to music   Things that I am able to do with others to cope or help me feel better: talk with others   Things I can use or do for distraction: rubberband, take shower, and walk   Changes I can make to support my mental health and wellness: hope things get better, stay positive   People in my life that I can ask for help: my mom, brother, sister, little brother, dad   Your [facility] has a mental health crisis team you can call 24/7: [facility] Mobile Crisis  [phone] (adults)  [phone] (children)  Other things that are important when I m in crisis: nothing   Appointment information and/or additional resources available to me: work with [facility], [facility] ([facility] ), and established psychiatry provider    Crisis Lines  Crisis Text Line  Text 478674  You will be connected with a trained live crisis counselor to provide support.  Por espanol, texto  TYRESE a 311871 o texto a 442-AYUDAME en WhatsApp  National Hope Line  1.800.SUICIDE [9305838]    Community Resources  Fast Tracker  Linking people to mental health and substance use disorder resources  fasttrackermn.org   [place] Mental Health Warm Line  Peer to peer support  Monday thru Saturday, 12 pm to 10 pm  838.511.2076 or 2.796.639.4620  Text \"Support\" to 37244  National [employer] (KRYSTAL)  645.988.0674 or 1.888.KRYSTAL.HELPS    Mental Health Apps  My3  https://my3app.org/  VirtualHopeBox  https://[employer].org/apps/virtual-hope-box/    Additional Information  Today you were seen by a licensed " mental health professional through Triage and Transition services, Behavioral Healthcare Providers ([facility])  for a crisis assessment in the Emergency Department at [facility].  It is recommended that you follow up with your established providers (psychiatrist, mental health therapist, and/or primary care doctor - as relevant) as soon as possible. Coordinators from [facility] will be calling you in the next 24-48 hours to ensure that you have the resources you need.  You can also contact [facility] coordinators directly at [phone]. You may have been scheduled for or offered an appointment with a mental health provider. [facility] maintains an extensive network of licensed behavioral health providers to connect patients with the services they need.  We do not charge providers a fee to participate in our referral network.  We match patients with providers based on a patient's specific needs, insurance coverage, and location.  Our first effort will be to refer you to a provider within your care system, and will utilize providers outside your care system as needed.

## 2022-06-16 NOTE — ED PROVIDER NOTES
"EmPATH Unit - Psychiatry  Combined Observation Note and Discharge Summary  [facility] Emergency Department  Observation Initiation Date: Barrett 15, 2022    Joseph Ayon Jr MRN: [id number]   Age: 26 year old YOB: 1995     History     Chief Complaint   Patient presents with     Psychiatric Evaluation     HPI  Joseph Ayon Jr is a 26 year old male with a past history notable for schizoaffective disorder and a history of substance use disorder presenting with a chief complaint of medication associated side effects and increased auditory hallucinations.     The patient is FPC through his [facility] programming at the [facility] with plans to transition to a sober house following the completion of his program. He states that his medication was changed from Clozaril to Haldol following a mental health hospitalization two to three months ago. Since then, he describes an, \"eye shuttering\" side effect that causes his eyes to roll upwards and is both distracting and bothersome. He states he has had this side effect in the past when on Haldol prior to this most recent medication change. Additionally, when questioned about inner restlessness he states he does have this, however, it does not coincide with the timeline of his dystonic eye movements.  He states that his auditory hallucination symptoms were about a 1/10 in severity while taking the Clozaril. Since starting the Haldol, he describes his current auditory hallucinations as a 5/10 in severity, not debilitating but constantly present in the background. Given that he recently received his IM Haldol one week ago on 6/9, any adjustments targeted at alleviating hallucinations will need to take place on an outpatient basis.     Past Medical History  No past medical history on file.  Past Surgical History:   Procedure Laterality Date     NO HISTORY OF SURGERY       benztropine (COGENTIN) 1 MG tablet  cloNIDine (CATAPRES) 0.1 MG tablet  cloNIDine " "(CATAPRES) 0.1 MG tablet  divalproex sodium extended-release (DEPAKOTE ER) 500 MG 24 hr tablet  gabapentin (NEURONTIN) 400 MG capsule  haloperidol (HALDOL) 10 MG tablet  haloperidol decanoate (HALDOL DECANOATE) 50 MG/ML SOLN injection  hydrOXYzine (VISTARIL) 50 MG capsule  OLANZapine (ZYPREXA) 10 MG tablet  OLANZapine (ZYPREXA) 20 MG tablet      No Known Allergies  Family History  Family History   Problem Relation Age of Onset     Neurologic Disorder Maternal Grandmother      Social History   Social History     Tobacco Use     Smoking status: Current Every Day Smoker     Packs/day: 1.00     Years: 9.00     Pack years: 9.00     Types: Cigarettes     Smokeless tobacco: Never Used   Substance Use Topics     Alcohol use: Yes     Comment: rarely     Drug use: Yes     Types: Marijuana      Past medical history, past surgical history, medications, allergies, family history, and social history were reviewed with the patient. No additional pertinent items.       Review of Systems  A complete review of systems was performed with pertinent positives and negatives noted in the HPI, and all other systems negative.    Physical Examination   BP: (!) 137/94  Pulse: 90  Temp: 97.9  F (36.6  C)  Resp: 18  Height: 170.2 cm (5' 7\")  Weight: 61.2 kg (135 lb)  SpO2: 98 %    Physical Exam  General: Appears stated age.   Neuro: Alert and fully oriented. Extremities appear to demonstrate normal strength on visual inspection.   Integumentary/Skin: no rash visualized, normal color    Psychiatric Examination   Appearance: dressed in hospital scrubs, appeared as age stated and mild distress  Attitude:  cooperative  Eye Contact:  fair  Mood:  anxious  Affect:  mood congruent and intensity is blunted  Speech:  clear, coherent and decreased prosody  Psychomotor Behavior:  no evidence of tardive dyskinesia, dystonia, or tics  Thought Process:  logical and goal oriented  Associations:  no loose associations  Thought Content:  no evidence of suicidal " "ideation or homicidal ideation  Insight:  fair  Judgement:  fair  Oriented to:  time, person, and place  Attention Span and Concentration:  fair  Recent and Remote Memory:  fair  Language: able to name/identify objects without impairment  Fund of Knowledge: intact with awareness of current and past events    ED Course        Labs Ordered and Resulted from Time of ED Arrival to Time of ED Departure   DRUG ABUSE SCREEN 77 URINE (FL, RH, SH) - Abnormal       Result Value    Amphetamines Urine Screen Negative      Barbiturates Urine Screen Negative      Benzodiazepines Urine Screen Negative      Cannabinoids Urine Screen Positive (*)     Cocaine Urine Screen Negative      Opiates Urine Screen Negative      PCP Urine Screen Negative     COVID-19 VIRUS (CORONAVIRUS) BY PCR - Normal    SARS CoV2 PCR Negative         Assessments & Plan (with Medical Decision Making)   Patient presenting with . Nursing notes reviewed noting no acute issues.     I have reviewed the assessment completed by the [facility].     During the observation period, the patient did not require medications for agitation, and did not require restraints/seclusion for patient and/or provider safety.    The patient was found to have a psychiatric condition that would benefit from an observation stay in the emergency department for further psychiatric stabilization and/or coordination of a safe disposition. The observation plan includes serial assessments of psychiatric condition, potential administration of medications if indicated, further disposition pending the patient's psychiatric course during the monitoring period.     Preliminary diagnosis:    ICD-10-CM    1. Auditory hallucinations  R44.0    2. Schizoaffective disorder, bipolar type (H)  F25.0    3. 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 Haldol dose on 6/9  -Patient received a dose of Benadryl last night 6/15; this may be partially responsible for the patient's ocular dystonia not being observable during the mental status exam  -Increase Cogentin dose from 1mg PO BID to TID to alleviate dystonic medication side effect  -Patient received IM Haldol injection one week ago on 6/9; discussion with patient about the gradual effect of this medication targeting auditory hallucination and side effects took place; any adjustments to IM Haldol targeting a therapeutic dose that alleviates patient's auditory hallucinations to a tolerable level will need to take place on an outpatient basis with patient's current psychiatrist   -Patient is appropriate for discharge back to [facility]      After the period in observation care, the patient's circumstances and mental state were safe for outpatient management. After counseling on the diagnosis, work-up, and treatment plan, the patient was discharged. Close follow-up with a psychiatrist and/or therapist was recommended and community psychiatric resources were provided. Patient is to return to the ED if any urgent or potentially life-threatening concerns.      At the time of discharge, the patient's acute suicide risk was determined to be low due to the following factors: Reduction in the intensity of mood/anxiety symptoms that preceded the admission, denial of suicidal thoughts, denies feeling helpless or helpless, not currently under the influence of alcohol or illicit substances, denies experiencing command hallucinations, no immediate access to firearms. The patient's acute risk could be higher if noncompliant with their treatment plan, medications, follow-up appointments or using illicit substances or alcohol. Protective factors include: social supports,  stable housing.    --  Derik PUCKETT  [facility]  EMERGENCY DEPT  EmPATH Unit  6/15/2022

## 2022-06-16 NOTE — PROGRESS NOTES
Pt is a 26  year old male with history of of Bipolar with psychotic feautures received from ED due to increase auditory that have been getting worse. Reports that he has been having SI without any plans only because of the frustration from the voices denies any HI. During transfer of pt [facility] ED to the EmPATH unit pt started complaining of un able to roll eyes and fell like his eyes were stock upward appears to be oculogyric crisis being side effects of antipsychotics. Pt stated that he is on Haldol decanoate and took some po haldol today at  [facility] although we could not find any records. Pt was sent here due to worsening hallucinations. The ED doctor was contacted and Benadryl 25-50 mg po ordered and administered. Nursing and risk assessments completed. Assessments reviewed with LMHP and physician. Video monitoring in progress, patient informed.  Admission information reviewed with patient. Patient given a tour of EmPATH and instructions on using the facility. Questions regarding EmPATH addressed. Pt search completed and belongings inventoried. Pt will stay in OBS since he came after 2200.

## 2022-06-16 NOTE — CONSULTS
6/15/2022  Joseph Ayon Jr 1995     [facility] Crisis Assessment    Patient was assessed: in person  Patient location: [facility] - consult room A  Was a release of information signed: Yes. Providers included on the release: any provider [facility] refers to; [facility], and Crystal ([facility] )    Referral Data and Chief Complaint  Joseph is a 26 year old who uses he/him pronouns. Patient presented to the ED via EMS and was referred to the ED by community provider(s). Patient is presenting to the ED for the following concerns: AH, VH, anxiety.      Informed Consent and Assessment Methods    Patient is his own guardian. Writer met with patient and explained the crisis assessment process, including applicable information disclosures and limits to confidentiality, assessed understanding of the process, and obtained consent to proceed with the assessment. Patient was observed to be able to participate in the assessment as evidenced by acknowledged role of writer and purpose of assessment, engaged in answering questions, and explored in disposition planning. Assessment methods included conducting a formal interview with patient, review of medical records, collaboration with medical staff, and obtaining relevant collateral information from family and community providers when available.    Narrative Summary   What led to the patient presenting for crisis services, factors that make the crisis life threatening or complex, stressors, how is this disrupting the patient's life, and how current functioning is in comparison to baseline. How is patient presenting during the assessment. Duration of the current crisis, coping skills attempted to reduce the crisis, community resources used, and past presentations.    Patient presented to the ED via EMS from [facility] with complaints of auditory and visual hallucinations and concerns that his eyes were rolling back into his head as a side effect of medication.   "Patient indicates he has been transitioning from Clozaril to Haldol so experiencing side effects. He identifies having a long history of mental health concerns (bipolar with psychosis), being under civil commitment and King currently, and working with [facility] case management.   Patient verbalizes wanting to feel better and \"quit worrying about life.\"     Patient shares that he started with a new psychiatrist and has been with that provider once so far.  He is not able to identify his medication names or doses.  Patient shares that he has a difficult time falling asleep and will also often get up at least once in the night.  He notes having more difficulty sitting still and has been observed to be pacing on the unit all shift.  Patient notes increased appetite and weight gain as he offers an ideal weight to be 135-140 pounds.      During the assessment patient, denies AH/VH/SIB/paranoia/delusions.  He also adds he has never attempted suicide.  He maintains good eye contact, clear thoughts, and speech is linear. He would like to return back to [facility] as soon as possible.     Community providers:  [facility] , new psychiatrist.     Past presentations include frequent ED visits over the past three years.  He reports 7-8 hospitalizations however chart review shows 10 hospitalizations over the past 5 years.     Collateral Information  Collected by Xiao Whittington on 6/15/2022 at 2220  The following information was received from Archie whose relationship to the patient is staff @ [facility]. Information was obtained via phone. Their phone number is # [phone] and they last had contact with patient on today.        How long have they been a resident: Joseph has been residing at [facility] since April 25th.     Why does patient live in the facility: He's here for his mental health & he has history of meth & THC use.     Significant changes to environment: Joseph " hasn't been coming down to eat dinner, not acting himself. I'm his favorite staff & I can usually get him to laugh but not today. He's not as social.     Legal status (Commitment, probation, guardian, etc.): Pt is under a Mental Health commitment     Has the patient made any comments about wanting to kill themselves/others: no     What happened today: Joseph told us he has been experiencing hallucinations & hearing things for past couple days. This is not typical for him.      What is different about patient's functioning: He's not going to groups this time. He stayed here before & would attend.      Concern about alcohol/drug use: No We don't think he would use while he's here.     If d/c is recommended, can patient return to current living situation: Yes.    If no, what needs to happen in order for patient to return: NA       Risk Assessment  Risk of Harm to Self   ESS-6  1.a. Over the past 2 weeks, have you had thoughts of killing yourself? No  1.b. Have you ever attempted to kill yourself and, if yes, when did this last happen? No   2. Recent or current suicide plan? No   3. Recent or current intent to act on ideation? No  4. Lifetime psychiatric hospitalization? Yes  5. Pattern of excessive substance use? No  6. Current irritability, agitation, or aggression? No  Scoring note: BOTH 1a and 1b must be yes for it to score 1 point, if both are not yes it is zero. All others are 1 point per number. If all questions 1a/1b - 6 are no, risk is negligible. If one of 1a/1b is yes, then risk is mild. If either question 2 or 3, but not both, is yes, then risk is automatically moderate regardless of total score. If both 2 and 3 are yes, risk is automatically high regardless of total score.     Score: 1, mild risk    The patient has the following risk factors for suicide: depressive symptoms, isolation, lack of support, poor decision making, recent loss and restless/agitated  Is the patient experiencing current suicidal  ideation: No  Is the patient engaging in preparatory suicide behaviors (formulating how to act on plan, giving away possessions, saying goodbye, displaying dramatic behavior changes, etc)? No  Does the patient have access to firearms or other lethal means? no    The patient has the following protective factors: voluntarily seeking mental health support, displays resiliency , established relationship community mental health provider(s), future focused thinking, displays insight, expresses desire to engage in treatment and safe/stable housing    Support system information: mom, older brother, sister, little brother, [facility] staff    Patient strengths: [employer], sports, staying active    Does the patient engage in non-suicidal self-injurious behavior (NSSI/SIB)? no  Is the patient vulnerable to sexual exploitation?  No  Is the patient experiencing abuse or neglect? no  Is the patient a vulnerable adult? No    Risk of Harm to Others  The patient has the following risk factors of harm to others: no risk factors identified  Does the patient have thoughts of harming others? No  Is the patient engaging in sexually inappropriate behavior?  no       Current Substance Abuse  Is there recent substance abuse? Substance type(s): Marijuana Frequency: just before I got here Quantity: not much Method: smoke Duration: yesterday Last use: yesterday  Was a urine drug screen or blood alcohol level obtained: Yes positive for cannibus    CAGE AID  Have you felt you ought to cut down on your drinking or drug use?  No  Have people annoyed you by criticizing your drinking or drug use? No  Have you felt bad or guilty about your drinking or drug use? No  Have you ever had a drink or used drugs first thing in the morning to steady your nerves or to get rid of a hangover? No  Score: 0/4       Current Symptoms/Concerns  Symptoms  Attention, hyperactivity, and impulsivity symptoms present: Yes: Restless  Anxiety symptoms present:  Yes: Generalized Symptoms: Cognitive anxiety - feelings of doom, racing thoughts, difficulty concentrating , Excessive worry and Pacing    Appetite symptoms present: No   Behavioral difficulties present: Yes: Wandering and Withdrawal/Isolation   Cognitive impairment symptoms present: No  Depressive symptoms present: Yes Depressed mood, Impaired concentration, Isolative  and Sleep disturbance    Eating disorder symptoms present: No  Learning disabilities, cognitive challenges, and/or developmental disorder symptoms present: No   Manic/hypomanic symptoms present: No  Personality and interpersonal functioning difficulties present : No  Psychosis symptoms present: Yes: Hallucinations: Auditory    Sleep difficulties present: Yes: Difficulty falling asleep   Substance abuse disorder symptoms present: No   Trauma and stressor related symptoms present: No     Mental Status Exam   Affect: Appropriate   Appearance: Appropriate    Attention Span/Concentration: Attentive?    Eye Contact: Engaged   Fund of Knowledge: Appropriate    Language /Speech Content: Fluent   Language /Speech Volume: Normal    Language /Speech Rate/Productions: Normal    Recent Memory: Intact   Remote Memory: Intact   Mood: Anxious, Depressed and Normal    Orientation to Person: Yes    Orientation to Place: Yes   Orientation to Time of Day: Yes    Orientation to Date: Yes    Situation (Do they understand why they are here?): Yes    Psychomotor Behavior: Normal    Thought Content: Clear   Thought Form: Goal Directed       Mental Health and Substance Abuse History  History  Current and historical diagnoses or mental health concerns: Bipolar with psychosis  Prior MH services (inpatient, programmatic care, outpatient, etc) : Yes inpatient and outpatient  Has the patient used [facility] crisis team services before?: No  History of substance abuse: No  Prior ISREAL services (inpatient, programmatic care, detox, outpatient, etc) : No  History of commitment: Yes current  [facility] commitment order  Family history of MH/ISREAL: undisclosed  Trauma history: No    Medication  Psychotropic medications:   Current Facility-Administered Medications   Medication     acetaminophen (TYLENOL) tablet 650 mg     diphenhydrAMINE (BENADRYL) capsule 25-50 mg     gabapentin (NEURONTIN) capsule 400 mg     hydrOXYzine (ATARAX) tablet 50 mg     melatonin tablet 3 mg     nicotine polacrilex (NICORETTE) gum 4 mg     OLANZapine zydis (zyPREXA) ODT tab 10 mg    Or     OLANZapine (zyPREXA) injection 10 mg     ondansetron (ZOFRAN ODT) ODT tab 4 mg     traZODone (DESYREL) tablet 50 mg     Current Outpatient Medications   Medication     benztropine (COGENTIN) 1 MG tablet     cloNIDine (CATAPRES) 0.1 MG tablet     cloNIDine (CATAPRES) 0.1 MG tablet     divalproex sodium extended-release (DEPAKOTE ER) 500 MG 24 hr tablet     gabapentin (NEURONTIN) 400 MG capsule     haloperidol (HALDOL) 10 MG tablet     haloperidol decanoate (HALDOL DECANOATE) 50 MG/ML SOLN injection     hydrOXYzine (VISTARIL) 50 MG capsule     OLANZapine (ZYPREXA) 10 MG tablet     OLANZapine (ZYPREXA) 20 MG tablet       Current Care Team  Primary Care Provider: No  Psychiatrist: yes, just started seeing a new provider yet does not recall their name or location  Therapist: No  : Yes. Name: Jovita. Location: [facility]. Date of last visit: undisclosed. Frequency: monthly. Perceived helpfulness: yes, helpful.  CTSS or ARMHS: No  ACT Team: No  Other: Yes. Name: [facility] staff. Location: [facility]. Date of last visit: yesterday. Frequency: daily. Perceived helpfulness: I like working with them.    Biopsychosocial Information  Socioeconomic Information  Current living situation: [facility]  Employment/income source: social security  Relevant legal issues: commitment and King, [facility] Probation, criminal court in August  Cultural, Hoahaoism, or spiritual influences on mental health care: no  Is the patient active in the   or a : No    Relevant Medical Concerns   Patient identifies concerns with completing ADLs? No   Patient can ambulate independently? Yes   Other medical concerns? No   History of concussion or TBI? No        Diagnosis    296.89 Bipolar II Disorder With mood congruent psychotic features - by history     Therapeutic Intervention  The following therapeutic methodologies were employed when working with the patient: establishing rapport, active listening, assessing dimensions of crisis, solution focused brief therapy, identifying additional supports and alternative coping skills, establishing a discharge plan, safety planning, psychoeducation, motivational interviewing and brief supportive therapy. Patient response to intervention: engaged.      Disposition  Recommended disposition: Medication Management and Residential Treatment: [facility]    Reviewed case and recommendations with attending provider. Attending Name: Willie    Attending concurs with disposition: Yes    Patient concurs with disposition: Yes    Guardian concurs with disposition: NA   Final disposition: Medication management and Residential treatment: [facility].       Clinical Substantiation of Recommendations   Rationale with supporting factors for disposition and diagnosis.     Patient identifies his mental health crisis from last night has subsided.  He feels better with the additional dose of Benadryl to manage the side effects he is experiencing during the transition from Clozaril to Haldol.  Patient is requesting to return to [facility] facility so he can complete their program without impacting his commitment status.  Patient shows insight into his mental health through expressing understanding his diagnosis, MH history, providers, and changes in symptom presentations.        Assessment Details  Patient interview started at: 1245 and completed at: 1320.  Total duration spent on the patient case in minutes: 1.0 hrs   CPT code(s)  utilized: 20353 - Psychotherapy for Crisis - 60 (30-74*) min       Aftercare and Safety Planning  Follow up plans with MH/ISREAL services: Yes continue with established psychiatry provider    Aftercare plan placed in the AVS and provided to patient: Yes. Given to patient by [facility] RN at time of discharge    TIFFANI Salazar      Aftercare Plan  If I am feeling unsafe or I am in a crisis, I will:   Contact my established care providers   Call the National Suicide Prevention Lifeline: 765.659.9565   Go to the nearest emergency room   Call 917     Warning signs that I or other people might notice when a crisis is developing for me: shutting eyes, isolation  Things I am able to do on my own to cope or help me feel better: walk, listen to music   Things that I am able to do with others to cope or help me feel better: talk with others   Things I can use or do for distraction: rubberband, take shower, and walk   Changes I can make to support my mental health and wellness: hope things get better, stay positive   People in my life that I can ask for help: my mom, brother, sister, little brother, dad   Your [facility] has a mental health crisis team you can call 24/7: [facility] Mobile Crisis  [phone] (adults)  [phone] (children)  Other things that are important when I m in crisis: nothing   Appointment information and/or additional resources available to me: work with [facility] staff, Jovita ([facility] ), and established psychiatry provider    Crisis Lines  Crisis Text Line  Text 692833  You will be connected with a trained live crisis counselor to provide support.  Por espanol, texto  TYRESE a 186303 o texto a 442-AYUDAME en WhatsApp  National Hope Line  1.800.SUICIDE [4253585]    Community Resources  Fast Tracker  Linking people to mental health and substance use disorder resources  fasttrackermn.org   [place] Mental Health Warm Line  Peer to peer support  Monday thru Saturday,  "12 pm to 10 pm  747.621.9339 or 3.114.258.3118  Text \"Support\" to 83805  National [employer] on Mental Illness (KRYSTAL)  424.173.3380 or 1.888.KRYSTAL.HELPS    Mental Health Apps  My3  https://Ecrebopp.org/  VirtualHopeBox  https://Definigen/apps/virtual-hope-box/    Additional Information  Today you were seen by a licensed mental health professional through Triage and Transition services, Behavioral Healthcare Providers ([facility])  for a crisis assessment in the Emergency Department at [facility].  It is recommended that you follow up with your established providers (psychiatrist, mental health therapist, and/or primary care doctor - as relevant) as soon as possible. Coordinators from [facility] will be calling you in the next 24-48 hours to ensure that you have the resources you need.  You can also contact [facility] coordinators directly at [phone]. You may have been scheduled for or offered an appointment with a mental health provider. [facility] maintains an extensive network of licensed behavioral health providers to connect patients with the services they need.  We do not charge providers a fee to participate in our referral network.  We match patients with providers based on a patient's specific needs, insurance coverage, and location.  Our first effort will be to refer you to a provider within your care system, and will utilize providers outside your care system as needed.      "

## 2022-06-16 NOTE — ED NOTES
Writer attempted to meet with Joseph to complete crisis assessment. Joseph reported being very sleepy and requested to meet with [facility] in the morning.     Sandra Veras LGSW

## 2022-06-16 NOTE — ED PROVIDER NOTES
"  History   Chief Complaint:  Psychiatric Evaluation     The history is provided by the patient.      Joseph Ayon Jr is a 26 year old male with history of bipolar disorder with psychotic features who presents with hallucinations. The patient reports that he lives in [facility] house and he recently changed medication. He is currently requesting his old medication (Clozaril) due to the new medication not being effective. He states that he is hearing voices and it is wearing him out to the point of suicidal ideation.     Review of Systems   Psychiatric/Behavioral: Positive for hallucinations and suicidal ideas.   All other systems reviewed and are negative.      Allergies:  The patient has no known allergies.     Medications:  Haldol   Lamictal   perphenazine    Past Medical History:     Psychosis   Bipolar 1 disorder  Suicidal thoughts   Depression  Lumbago   Disorder of bursae and tendons in shoulder region   Tobacco use    Social History:  The patient presents to the ED via EMS.    Physical Exam     Patient Vitals for the past 24 hrs:   BP Temp Temp src Pulse Resp SpO2 Height Weight   06/15/22 1958 (!) 137/94 97.9  F (36.6  C) Temporal 90 18 98 % 1.702 m (5' 7\") 61.2 kg (135 lb)       Physical Exam  Constitutional: Alert, attentive, GCS 15   Eyes: EOM are normal, anicteric, conjugate gaze  CV: distal extremities warm, well perfused  Chest: Non-labored breathing on RA  Neurological: Alert, attentive, moving all extremities equally.   Skin: Skin is warm and dry.  Psych: Auditory and visual hallucinations. Passive suicidal idiation        Emergency Department Course   Laboratory:  Labs Ordered and Resulted from Time of ED Arrival to Time of ED Departure   DRUG ABUSE SCREEN 77 URINE (FL, RH, SH) - Abnormal       Result Value    Amphetamines Urine Screen Negative      Barbiturates Urine Screen Negative      Benzodiazepines Urine Screen Negative      Cannabinoids Urine Screen Positive (*)     Cocaine Urine Screen " Negative      Opiates Urine Screen Negative      PCP Urine Screen Negative     COVID-19 VIRUS (CORONAVIRUS) BY PCR - Normal    SARS CoV2 PCR Negative          Emergency Department Course:    Mental Health Risk Assessment      PSS-3    Date and Time Over the past 2 weeks have you felt down, depressed, or hopeless? Over the past 2 weeks have you had thoughts of killing yourself? Have you ever attempted to kill yourself? When did this last happen? User   06/15/22 2007 yes yes no -- FA      C-SSRS ([place])    Date and Time Q1 Wished to be Dead (Past Month) Q2 Suicidal Thoughts (Past Month) Q3 Suicidal Thought Method Q4 Suicidal Intent without Specific Plan Q5 Suicide Intent with Specific Plan Q6 Suicide Behavior (Lifetime) Within the Past 3 Months? RETIRED: Level of Risk per Screen Screening Not Complete User   06/15/22 2007 no no -- -- -- -- -- -- -- FA                  Reviewed:  I reviewed nursing notes, vitals and past medical history    Assessments:  2022 I obtained history and examined the patient as noted above.     Disposition:  The patient was transferred to [facility].     Impression & Plan   Medical Decision Making:  Pleasant 26-year-old gentleman past medical history significant for bipolar disorder with psychotic features presenting from his IRs for persistent and increasing auditory and visual hallucinations.  He had previously been on Clozaril and was changed to Haldol after hospitalization at [facility] 3 months ago and is wondering if he should go back on Clozaril.  He denies any suicidal or homicidal ideation.  He does endorse some marijuana use.  He denies other drugs or alcohol.  He is medically cleared for transfer to empath unit for further evaluation of hallucinations likely secondary to bipolar disorder.    Diagnosis:    ICD-10-CM    1. Auditory hallucinations  R44.0    2. Bipolar affective disorder, currently manic, severe, with psychotic features (H)  F31.2      Yvon Ellis MD  Emergency  [employer]  10:33 PM 06/15/22     Scribe Disclosure:  I, Lito Whitneynway, am serving as a scribe at 8:07 PM on 6/15/2022 to document services personally performed by Yvon Ellis MD based on my observations and the provider's statements to me.           Yvon Ellis MD  06/15/22 [id number]

## 2022-06-16 NOTE — ED NOTES
Video Observation initiated, patient informed.  Pt changed into hospital scrubs.     Carmina Barr RN

## 2022-06-16 NOTE — PROGRESS NOTES
Discharge instructions reviewed with patient including follow-up care plan. Educated on medication regime and advised not to stop prescribed medication without consulting their physician. Reviewed safety plan and outpatient resources.Denies SI, HI or hallucinaions. All belongings which where brought into the hospital have been returned to patient. Escorted off the unit at 16:19  Accompanied by staff.  Discharged to [facility] and picked up by their staff.

## 2022-06-16 NOTE — ED NOTES
Pt is still asleep in his recliner no evidence of distress at this moment. Writer called [facility] to request for med list and spoke to Rylie who would be faxing the med list to us, She also acknowledged that pt is known there with Kat as last name instead of Gabo Coronado as is currently here.

## 2022-06-20 ENCOUNTER — TELEPHONE (OUTPATIENT)
Dept: FAMILY MEDICINE | Facility: CLINIC | Age: 27
End: 2022-06-20

## 2022-06-20 NOTE — TELEPHONE ENCOUNTER
Has not been seen by PCP Jacky Agustin since 2018?    Was in ER for auditory hallucinations on 6/15/22.   Looks like he sees behavioral health in [facility] system and had family practice visit with [facility] provider Jan 2020.    Does he plan to receive further care here or should we change PCP on chart?    Routed to RN pool to follow up with patient, high risk mental health issues.    Adwoa Moreira RN  [facility]

## 2022-06-20 NOTE — TELEPHONE ENCOUNTER
"I called home/mobile number listed, adult male answered the first time but he may have said \"dean\" instead of \"hello\", I introduced myself and the line disconnected.    I called back, no answer, rolled to voicemail but no voicemail set up.    Try another time/day.    Adwoa Moreira RN  [facility]      "

## 2022-06-21 NOTE — TELEPHONE ENCOUNTER
Attempted to call patient at home/mobile number listed, no answer, no voicemail set up so unable to leave a message.   Try another day/time.    Adwoa Moreira RN  [facility]

## 2022-06-24 NOTE — TELEPHONE ENCOUNTER
Please send a letter to contact clinic, unable to reach patient.  Riya Marcial RN  [facility]

## 2023-06-07 ENCOUNTER — HOSPITAL ENCOUNTER (EMERGENCY)
Facility: HOSPITAL | Age: 28
Discharge: HOME | End: 2023-06-07
Attending: NURSE PRACTITIONER | Admitting: NURSE PRACTITIONER
Payer: MEDICARE

## 2023-06-07 ENCOUNTER — APPOINTMENT (OUTPATIENT)
Dept: GENERAL RADIOLOGY | Facility: HOSPITAL | Age: 28
End: 2023-06-07
Attending: NURSE PRACTITIONER
Payer: MEDICARE

## 2023-06-07 VITALS
RESPIRATION RATE: 16 BRPM | SYSTOLIC BLOOD PRESSURE: 131 MMHG | OXYGEN SATURATION: 99 % | WEIGHT: 134.92 LBS | DIASTOLIC BLOOD PRESSURE: 87 MMHG | TEMPERATURE: 97.2 F | BODY MASS INDEX: 20.45 KG/M2 | HEIGHT: 68 IN | HEART RATE: 77 BPM

## 2023-06-07 DIAGNOSIS — R07.89 CHEST WALL PAIN: Primary | ICD-10-CM

## 2023-06-07 PROCEDURE — G0463 HOSPITAL OUTPT CLINIC VISIT: HCPCS | Mod: 25

## 2023-06-07 PROCEDURE — 99213 OFFICE O/P EST LOW 20 MIN: CPT | Performed by: NURSE PRACTITIONER

## 2023-06-07 PROCEDURE — 71046 X-RAY EXAM CHEST 2 VIEWS: CPT

## 2023-06-07 ASSESSMENT — ENCOUNTER SYMPTOMS
CHILLS: 0
MYALGIAS: 1
COUGH: 0
WHEEZING: 0
FEVER: 0
PALPITATIONS: 0
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
## 2023-06-07 NOTE — DISCHARGE INSTRUCTIONS
Apply cold packs to the affected side of your chest for 20 minutes on and off 2-3 times a day.    I would recommend taking ibuprofen or naproxen as needed for the pain.  Continue taking deep breaths every now and then.    Follow-up with your doctor if no improvement in symptoms.    Return to urgent care or emergency department for any worsening or concerning symptoms.

## 2023-08-05 ENCOUNTER — HOSPITAL ENCOUNTER (INPATIENT)
Facility: HOSPITAL | Age: 28
LOS: 12 days | Discharge: NURSING, REHAB OR LONG-TERM CARE | DRG: 885 | End: 2023-08-18
Attending: NURSE PRACTITIONER | Admitting: STUDENT IN AN ORGANIZED HEALTH CARE EDUCATION/TRAINING PROGRAM
Payer: MEDICARE

## 2023-08-05 ENCOUNTER — TELEPHONE (OUTPATIENT)
Dept: BEHAVIORAL HEALTH | Facility: CLINIC | Age: 28
End: 2023-08-05
Payer: MEDICARE

## 2023-08-05 DIAGNOSIS — F25.0 SCHIZOAFFECTIVE DISORDER, BIPOLAR TYPE (H): Primary | ICD-10-CM

## 2023-08-05 DIAGNOSIS — R45.1 AGITATION: ICD-10-CM

## 2023-08-05 DIAGNOSIS — T43.505D: ICD-10-CM

## 2023-08-05 DIAGNOSIS — F22 PARANOIA (H): ICD-10-CM

## 2023-08-05 PROBLEM — F15.20 AMPHETAMINE USE DISORDER, SEVERE (H): Status: ACTIVE | Noted: 2021-03-25

## 2023-08-05 LAB
AMPHETAMINES UR QL: NOT DETECTED
ANION GAP SERPL CALCULATED.3IONS-SCNC: 14 MMOL/L (ref 7–15)
BARBITURATES UR QL SCN: NOT DETECTED
BASOPHILS # BLD AUTO: 0.1 10E3/UL (ref 0–0.2)
BASOPHILS NFR BLD AUTO: 1 %
BENZODIAZ UR QL SCN: NOT DETECTED
BUN SERPL-MCNC: 5.4 MG/DL (ref 6–20)
BUPRENORPHINE UR QL: NOT DETECTED
CALCIUM SERPL-MCNC: 9.2 MG/DL (ref 8.6–10)
CANNABINOIDS UR QL: DETECTED
CHLORIDE SERPL-SCNC: 101 MMOL/L (ref 98–107)
COCAINE UR QL SCN: NOT DETECTED
CREAT SERPL-MCNC: 0.75 MG/DL (ref 0.67–1.17)
D-METHAMPHET UR QL: NOT DETECTED
DEPRECATED HCO3 PLAS-SCNC: 21 MMOL/L (ref 22–29)
EOSINOPHIL # BLD AUTO: 0 10E3/UL (ref 0–0.7)
EOSINOPHIL NFR BLD AUTO: 0 %
ERYTHROCYTE [DISTWIDTH] IN BLOOD BY AUTOMATED COUNT: 12.4 % (ref 10–15)
ETHANOL SERPL-MCNC: <0.01 G/DL
GFR SERPL CREATININE-BSD FRML MDRD: >90 ML/MIN/1.73M2
GLUCOSE SERPL-MCNC: 142 MG/DL (ref 70–99)
HCT VFR BLD AUTO: 44 % (ref 40–53)
HGB BLD-MCNC: 15.2 G/DL (ref 13.3–17.7)
IMM GRANULOCYTES # BLD: 0 10E3/UL
IMM GRANULOCYTES NFR BLD: 0 %
LYMPHOCYTES # BLD AUTO: 4.1 10E3/UL (ref 0.8–5.3)
LYMPHOCYTES NFR BLD AUTO: 45 %
MCH RBC QN AUTO: 28.9 PG (ref 26.5–33)
MCHC RBC AUTO-ENTMCNC: 34.5 G/DL (ref 31.5–36.5)
MCV RBC AUTO: 84 FL (ref 78–100)
METHADONE UR QL SCN: NOT DETECTED
MONOCYTES # BLD AUTO: 0.5 10E3/UL (ref 0–1.3)
MONOCYTES NFR BLD AUTO: 6 %
NEUTROPHILS # BLD AUTO: 4.3 10E3/UL (ref 1.6–8.3)
NEUTROPHILS NFR BLD AUTO: 48 %
NRBC # BLD AUTO: 0 10E3/UL
NRBC BLD AUTO-RTO: 0 /100
OPIATES UR QL SCN: NOT DETECTED
OXYCODONE UR QL SCN: NOT DETECTED
PCP UR QL SCN: NOT DETECTED
PLATELET # BLD AUTO: 195 10E3/UL (ref 150–450)
POTASSIUM SERPL-SCNC: 3.5 MMOL/L (ref 3.4–5.3)
PROPOXYPH UR QL: NOT DETECTED
RBC # BLD AUTO: 5.26 10E6/UL (ref 4.4–5.9)
SARS-COV-2 RNA RESP QL NAA+PROBE: NEGATIVE
SODIUM SERPL-SCNC: 136 MMOL/L (ref 136–145)
TRICYCLICS UR QL SCN: NOT DETECTED
WBC # BLD AUTO: 9 10E3/UL (ref 4–11)

## 2023-08-05 PROCEDURE — 99285 EMERGENCY DEPT VISIT HI MDM: CPT

## 2023-08-05 PROCEDURE — 99285 EMERGENCY DEPT VISIT HI MDM: CPT | Performed by: NURSE PRACTITIONER

## 2023-08-05 PROCEDURE — 85025 COMPLETE CBC W/AUTO DIFF WBC: CPT | Performed by: NURSE PRACTITIONER

## 2023-08-05 PROCEDURE — 250N000013 HC RX MED GY IP 250 OP 250 PS 637: Performed by: NURSE PRACTITIONER

## 2023-08-05 PROCEDURE — 80076 HEPATIC FUNCTION PANEL: CPT | Performed by: STUDENT IN AN ORGANIZED HEALTH CARE EDUCATION/TRAINING PROGRAM

## 2023-08-05 PROCEDURE — C9803 HOPD COVID-19 SPEC COLLECT: HCPCS

## 2023-08-05 PROCEDURE — 83036 HEMOGLOBIN GLYCOSYLATED A1C: CPT | Performed by: STUDENT IN AN ORGANIZED HEALTH CARE EDUCATION/TRAINING PROGRAM

## 2023-08-05 PROCEDURE — 87635 SARS-COV-2 COVID-19 AMP PRB: CPT | Performed by: NURSE PRACTITIONER

## 2023-08-05 PROCEDURE — 80306 DRUG TEST PRSMV INSTRMNT: CPT | Performed by: NURSE PRACTITIONER

## 2023-08-05 PROCEDURE — 80061 LIPID PANEL: CPT | Performed by: STUDENT IN AN ORGANIZED HEALTH CARE EDUCATION/TRAINING PROGRAM

## 2023-08-05 PROCEDURE — 82077 ASSAY SPEC XCP UR&BREATH IA: CPT | Performed by: NURSE PRACTITIONER

## 2023-08-05 PROCEDURE — 36415 COLL VENOUS BLD VENIPUNCTURE: CPT | Performed by: NURSE PRACTITIONER

## 2023-08-05 PROCEDURE — 80048 BASIC METABOLIC PNL TOTAL CA: CPT | Performed by: NURSE PRACTITIONER

## 2023-08-05 RX ORDER — HYDROXYZINE HYDROCHLORIDE 25 MG/1
50 TABLET, FILM COATED ORAL EVERY 6 HOURS PRN
Status: DISCONTINUED | OUTPATIENT
Start: 2023-08-05 | End: 2023-08-06 | Stop reason: DRUGHIGH

## 2023-08-05 RX ORDER — HYDROXYZINE HYDROCHLORIDE 25 MG/1
50 TABLET, FILM COATED ORAL ONCE
Status: COMPLETED | OUTPATIENT
Start: 2023-08-05 | End: 2023-08-05

## 2023-08-05 RX ADMIN — NICOTINE POLACRILEX 2 MG: 2 GUM, CHEWING ORAL at 21:40

## 2023-08-05 RX ADMIN — HYDROXYZINE HYDROCHLORIDE 50 MG: 25 TABLET, FILM COATED ORAL at 20:14

## 2023-08-05 ASSESSMENT — ACTIVITIES OF DAILY LIVING (ADL)
ADLS_ACUITY_SCORE: 37
ADLS_ACUITY_SCORE: 37

## 2023-08-06 PROBLEM — R45.1 AGITATION: Status: ACTIVE | Noted: 2023-08-06

## 2023-08-06 PROBLEM — F22 PARANOIA (H): Status: ACTIVE | Noted: 2023-08-06

## 2023-08-06 PROCEDURE — 250N000013 HC RX MED GY IP 250 OP 250 PS 637: Performed by: NURSE PRACTITIONER

## 2023-08-06 PROCEDURE — 250N000013 HC RX MED GY IP 250 OP 250 PS 637

## 2023-08-06 PROCEDURE — 250N000013 HC RX MED GY IP 250 OP 250 PS 637: Performed by: INTERNAL MEDICINE

## 2023-08-06 PROCEDURE — 124N000001 HC R&B MH

## 2023-08-06 RX ORDER — LORAZEPAM 2 MG/ML
2 INJECTION INTRAMUSCULAR EVERY 6 HOURS
Status: DISCONTINUED | OUTPATIENT
Start: 2023-08-06 | End: 2023-08-06

## 2023-08-06 RX ORDER — LORAZEPAM 1 MG/1
2 TABLET ORAL EVERY 6 HOURS PRN
Status: DISCONTINUED | OUTPATIENT
Start: 2023-08-06 | End: 2023-08-07

## 2023-08-06 RX ORDER — HALOPERIDOL 5 MG/ML
5 INJECTION INTRAMUSCULAR EVERY 6 HOURS PRN
Status: DISCONTINUED | OUTPATIENT
Start: 2023-08-06 | End: 2023-08-06

## 2023-08-06 RX ORDER — HALOPERIDOL 5 MG/ML
5 INJECTION INTRAMUSCULAR EVERY 6 HOURS PRN
Status: DISCONTINUED | OUTPATIENT
Start: 2023-08-06 | End: 2023-08-18 | Stop reason: HOSPADM

## 2023-08-06 RX ORDER — OLANZAPINE 10 MG/2ML
10 INJECTION, POWDER, FOR SOLUTION INTRAMUSCULAR 3 TIMES DAILY PRN
Status: DISCONTINUED | OUTPATIENT
Start: 2023-08-06 | End: 2023-08-07

## 2023-08-06 RX ORDER — LORAZEPAM 2 MG/ML
2 INJECTION INTRAMUSCULAR EVERY 6 HOURS PRN
Status: DISCONTINUED | OUTPATIENT
Start: 2023-08-06 | End: 2023-08-18 | Stop reason: HOSPADM

## 2023-08-06 RX ORDER — LANOLIN ALCOHOL/MO/W.PET/CERES
3 CREAM (GRAM) TOPICAL
Status: DISCONTINUED | OUTPATIENT
Start: 2023-08-06 | End: 2023-08-18 | Stop reason: HOSPADM

## 2023-08-06 RX ORDER — ACETAMINOPHEN 325 MG/1
650 TABLET ORAL EVERY 4 HOURS PRN
Status: DISCONTINUED | OUTPATIENT
Start: 2023-08-06 | End: 2023-08-18 | Stop reason: HOSPADM

## 2023-08-06 RX ORDER — MAGNESIUM HYDROXIDE/ALUMINUM HYDROXICE/SIMETHICONE 120; 1200; 1200 MG/30ML; MG/30ML; MG/30ML
30 SUSPENSION ORAL EVERY 4 HOURS PRN
Status: DISCONTINUED | OUTPATIENT
Start: 2023-08-06 | End: 2023-08-18 | Stop reason: HOSPADM

## 2023-08-06 RX ORDER — HALOPERIDOL 5 MG/1
5 TABLET ORAL EVERY 6 HOURS PRN
Status: DISCONTINUED | OUTPATIENT
Start: 2023-08-06 | End: 2023-08-07

## 2023-08-06 RX ORDER — DIPHENHYDRAMINE HYDROCHLORIDE 50 MG/ML
50 INJECTION INTRAMUSCULAR; INTRAVENOUS EVERY 6 HOURS PRN
Status: DISCONTINUED | OUTPATIENT
Start: 2023-08-06 | End: 2023-08-06

## 2023-08-06 RX ORDER — OLANZAPINE 10 MG/1
10 TABLET, ORALLY DISINTEGRATING ORAL AT BEDTIME
Status: DISCONTINUED | OUTPATIENT
Start: 2023-08-06 | End: 2023-08-07

## 2023-08-06 RX ORDER — HYDROXYZINE HYDROCHLORIDE 25 MG/1
50 TABLET, FILM COATED ORAL EVERY 4 HOURS PRN
Status: DISCONTINUED | OUTPATIENT
Start: 2023-08-06 | End: 2023-08-18 | Stop reason: HOSPADM

## 2023-08-06 RX ORDER — DIPHENHYDRAMINE HYDROCHLORIDE 50 MG/ML
50 INJECTION INTRAMUSCULAR; INTRAVENOUS EVERY 6 HOURS PRN
Status: DISCONTINUED | OUTPATIENT
Start: 2023-08-06 | End: 2023-08-18 | Stop reason: HOSPADM

## 2023-08-06 RX ORDER — OLANZAPINE 10 MG/1
10 TABLET ORAL 3 TIMES DAILY PRN
Status: DISCONTINUED | OUTPATIENT
Start: 2023-08-06 | End: 2023-08-07

## 2023-08-06 RX ORDER — DIPHENHYDRAMINE HCL 50 MG
50 CAPSULE ORAL EVERY 6 HOURS PRN
Status: DISCONTINUED | OUTPATIENT
Start: 2023-08-06 | End: 2023-08-07

## 2023-08-06 RX ADMIN — LORAZEPAM 2 MG: 1 TABLET ORAL at 19:02

## 2023-08-06 RX ADMIN — NICOTINE POLACRILEX 2 MG: 2 GUM, CHEWING ORAL at 02:28

## 2023-08-06 RX ADMIN — OLANZAPINE 10 MG: 5 TABLET, ORALLY DISINTEGRATING ORAL at 07:43

## 2023-08-06 RX ADMIN — DIPHENHYDRAMINE HYDROCHLORIDE 50 MG: 50 CAPSULE ORAL at 19:02

## 2023-08-06 RX ADMIN — HALOPERIDOL 5 MG: 5 TABLET ORAL at 19:02

## 2023-08-06 ASSESSMENT — ACTIVITIES OF DAILY LIVING (ADL)
ADLS_ACUITY_SCORE: 37
ADLS_ACUITY_SCORE: 37
ADLS_ACUITY_SCORE: 57
FALL_HISTORY_WITHIN_LAST_SIX_MONTHS: NO
DIFFICULTY_EATING/SWALLOWING: OTHER (SEE COMMENTS)
TOILETING_ISSUES: OTHER (SEE COMMENTS)
WEAR_GLASSES_OR_BLIND: OTHER (SEE COMMENTS)
ADLS_ACUITY_SCORE: 37
CHANGE_IN_FUNCTIONAL_STATUS_SINCE_ONSET_OF_CURRENT_ILLNESS/INJURY: NO
ADLS_ACUITY_SCORE: 53
ADLS_ACUITY_SCORE: 57
ADLS_ACUITY_SCORE: 37
WALKING_OR_CLIMBING_STAIRS_DIFFICULTY: OTHER (SEE COMMENTS)
CONCENTRATING,_REMEMBERING_OR_MAKING_DECISIONS_DIFFICULTY: OTHER (SEE COMMENTS)
ADLS_ACUITY_SCORE: 37
DOING_ERRANDS_INDEPENDENTLY_DIFFICULTY: OTHER (SEE COMMENTS)
ADLS_ACUITY_SCORE: 37
EQUIPMENT_CURRENTLY_USED_AT_HOME: OTHER (SEE COMMENTS)
DRESSING/BATHING_DIFFICULTY: OTHER (SEE COMMENTS)

## 2023-08-06 NOTE — ED NOTES
Central Intake was called regarding bed placement update. No update date at this time. Central Intake  will continue to work on pt. Placement. Care team has been updated.

## 2023-08-06 NOTE — PROGRESS NOTES
"Triage & Transition Services, Extended Care     Therapy Progress Note    Patient: Joseph goes by \"Joseph,\" uses he/him pronouns  Date of Service: August 6, 2023  Site of Service: [facility]   Patient was seen virtually ([employer] cart or other teleconferencing device).     Presenting problem:   Joseph is followed related to Long wait time for admission: over 24 hours . Please see initial DEC/LM Crisis Assessment completed by Sofia Thapa [facility] on 8/5/2023 for complete assessment information. Notable concerns include psychosis, paranoid ideation.     Individuals Present: Joseph & Ying Batista [facility]    Session start: 1440  Session end: 1442  Session duration in minutes: 2  Session number: 1  Anticipated number of sessions or this episode of care: 2-5  CPT utilized: not a billable encounter.     Current Presentation:     Pt presented with an anxious, irritable and guarded affect. Pts mood was congruent with this presentation, Pt was pacing throughout meeting. Pt stated 'I decline\" several times in response to all safety questions, Pt did not request to leave and nodded his head when writer endorsed plan of care to Pt, Pt appears to be agreeable to IP MH tx. But writer assessment was very limited, this was notified with attending provider. Pt presented to the ED due to increased agitation, paranoia, disorganization and psychosis sx. At this time it does appear an IP MH admission would be therapeutically beneficial for Pt to mitigate current safety concerns and help further stabilize Pt.      Mental Status Exam:   Appearance: awake, alert  Attitude: uncooperative  Eye Contact: poor   Mood: anxious  Affect: mood congruent and guarded  Speech: clear, coherent  Psychomotor Behavior: no evidence of tardive dyskinesia, dystonia, or tics  Thought Process:  disorganized  Associations:  yahaira  Thought Content:  yahaira  Insight: limited  Judgement: limited  Oriented to: time, person, and place  Attention Span " and Concentration: limited  Recent and Remote Memory:  yahaira    Diagnosis:   Bipolar 1 disorder, manic, moderate (H) F31.12   Psychosis, unspecified psychosis type (H) F29    Schizoaffective disorder, bipolar type (H) F25.0   Amphetamine use disorder, severe (H) F15.20    Therapeutic Intervention(s):   Provided active listening, unconditional positive regard, and validation.    Treatment Objective(s) Addressed:   The focus of this session was on rapport building, orienting the patient to therapy, and assessing safety.     Progress Towards Goals:   Patient reports stable symptoms. Patient is making progress towards treatment goals as evidenced by Pt has been receptive to ED interventions.     Case Management:   None at time of assessment .      General Recommendations:   Continue to monitor for harm. Consider: Use a positive, direct and calm approach. Pt's tend to match the energy/mood of the staff. Keep focus positive and upbeat, Use clear and concise directions, too many words can be overwhelming, Provide the pt with options to provide a sense of control. Try to tell the pt what they can do instead of what they can't do, Be firm but gentle when redirecting, Listen in a neutral, non-judgmental way. Offer reassurance, and Be mindful of your nonverbal cues (body language, facial expressions)    Plan:   Inpatient Mental Health: Pt presented to the ED due to increased agitation, paranoia, disorganization and psychosis sx. At this time it does appear an IP MH admission would be therapeutically beneficial for Pt to mitigate current safety concerns and help further stabilize Pt.     Plan for Care reviewed with Assigned Medical Provider? Yes. Provider, Dr. Rudd, response: in agreement.      Ying Batista, [facility]   Licensed Mental Health Professional (LMHP), [facility]  [phone]

## 2023-08-06 NOTE — ED NOTES
"Pt is here voluntarily as he believes he will be at risk for making bad decisions if he is outside of a safe environment. Pt is pacing and excitable and agitated. Pt states this is due to not being on any psych meds for a while and denies any drug use. Pt appears A/O but states he does not pay attention to time so will not state what month or year it is and the subject increases agitation. Pt denies SI or HI. He may also be hallucinating because when asked he states \"if you believe something, and you see it.\" But will not explain further. Pt also did not want to say what his medical hx was. Pt is cooperative except for explaining subjects he is not comfortable with. Pt has been searched and has given up his belongings to be secured which include, lighter, cigarette, \"alfredo\" quarter, wallet, and phone. Belongings are in locker A. Pt has been wanded down by security. Provider does not believe at this time pt requires 1:1 or change into tieless clothing. Environment has been reviewed and made safe. Pt is ambulatory.   "
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
## 2023-08-06 NOTE — ED NOTES
Pt is pacing in his room, talking to himself. Willing to take oral Zyprexa at this time. Cooperative with getting vital signs checked. Denies pain. Declined breakfast. Milk given per request.

## 2023-08-06 NOTE — ED NOTES
Pt ate a sandwhich. Drinking milk and water. Laying in bed. Repositioning self independently. RR even and non-labored.

## 2023-08-06 NOTE — PLAN OF CARE
Joseph YUSEF Kat  August 5, 2023  Plan of Care Hand-off Note     Patient Care Path: inpatient mental health    Plan for Care:   Patient appears confused, irritable, difficult to assess.  Patient is not a safety risk in terms of suicidal, He denies any SI or Hx of SI.  Patient does appear to struggle with his ability to care for himself given his current state of psychosis and judi.  Patient will greatly benefit from being seen by a psychiatrist and restarting his mental health medications.    Identified Goals and Safety Issues:  Irritable, confused, manic-like behavior      Legal Status: Legal Status at Admission: Voluntary/Patient has signed consent for treatment    Psychiatry Consult: NA- placed on list for 5th floor admission, Pt also on list with Central intake.        Updated bedside Provider  regarding plan of care.       Sofia Veronica, JASON, LICSW, LMHP  [facility] - Triage & Transition Services  Callback: [phone]
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
## 2023-08-06 NOTE — ED NOTES
Pt awakes to name. Agitated. Ying from [facility] attempted to meet with pt via Ipad and pt declined.

## 2023-08-06 NOTE — ED NOTES
IP MH Referral Acuity Rating Score (RARS)    LMHP complete at referral to IP MH, with DEC; and, daily while awaiting IP MH placement. Call score to PPS.  CRITERIA SCORING   New 72 HH and Involuntary for IP MH (not adolescent) 0/1   Boarding over 24 hours 0/1   Vulnerable adult at least 55+ with multiple co morbidities; or, Patient age 11 or under 0/1   Suicide ideation without relief of precipitating factors 0/1   Current plan for suicide 0/1   Current plan for homicide 0/1   Imminent risk or actual attempt to seriously harm another without relief of factors precipitating the attempt 0/1   Severe dysfunction in daily living (ex: complete neglect for self care, extreme disruption in vegetative function, extreme deterioration in social interactions) 0/1   Recent (last 2 weeks) or current physical aggression in the ED 0/1   Restraints or seclusion episode in ED 0/1   Verbal aggression, agitation, yelling, etc., while in the ED 0/1   Active psychosis with psychomotor agitation or catatonia 1/1   Need for constant or near constant redirection (from leaving, from others, etc).  0/1   Intrusive or disruptive behaviors 0/1   TOTAL Acuity Total Score: 1
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
## 2023-08-06 NOTE — ED NOTES
Awakes to name. Cooperative. V.S.S. Soda and sandwhich given at this time. Denies pain. Pt agreeable to go up to [address].
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
## 2023-08-07 LAB
ALBUMIN SERPL BCG-MCNC: 4.7 G/DL (ref 3.5–5.2)
ALP SERPL-CCNC: 68 U/L (ref 40–129)
ALT SERPL W P-5'-P-CCNC: 11 U/L (ref 0–70)
AST SERPL W P-5'-P-CCNC: 16 U/L (ref 0–45)
BILIRUB DIRECT SERPL-MCNC: <0.2 MG/DL (ref 0–0.3)
BILIRUB SERPL-MCNC: 0.4 MG/DL
CHOLEST SERPL-MCNC: 87 MG/DL
EST. AVERAGE GLUCOSE BLD GHB EST-MCNC: 123 MG/DL
HBA1C MFR BLD: 5.9 %
HDLC SERPL-MCNC: 30 MG/DL
LDLC SERPL CALC-MCNC: 39 MG/DL
NONHDLC SERPL-MCNC: 57 MG/DL
PROT SERPL-MCNC: 7.5 G/DL (ref 6.4–8.3)
TRIGL SERPL-MCNC: 89 MG/DL

## 2023-08-07 PROCEDURE — 250N000013 HC RX MED GY IP 250 OP 250 PS 637: Performed by: NURSE PRACTITIONER

## 2023-08-07 PROCEDURE — 250N000013 HC RX MED GY IP 250 OP 250 PS 637: Performed by: STUDENT IN AN ORGANIZED HEALTH CARE EDUCATION/TRAINING PROGRAM

## 2023-08-07 PROCEDURE — 124N000001 HC R&B MH

## 2023-08-07 PROCEDURE — 99223 1ST HOSP IP/OBS HIGH 75: CPT | Mod: AI | Performed by: STUDENT IN AN ORGANIZED HEALTH CARE EDUCATION/TRAINING PROGRAM

## 2023-08-07 RX ORDER — HALOPERIDOL 5 MG/1
5 TABLET ORAL EVERY 6 HOURS PRN
Status: DISCONTINUED | OUTPATIENT
Start: 2023-08-07 | End: 2023-08-18 | Stop reason: HOSPADM

## 2023-08-07 RX ORDER — ATOMOXETINE 60 MG/1
60 CAPSULE ORAL DAILY
Status: ON HOLD | COMMUNITY
Start: 2023-01-26 | End: 2023-08-18

## 2023-08-07 RX ORDER — TRAZODONE HYDROCHLORIDE 100 MG/1
1 TABLET ORAL AT BEDTIME
Status: ON HOLD | COMMUNITY
Start: 2023-01-25 | End: 2023-08-18

## 2023-08-07 RX ORDER — POLYETHYLENE GLYCOL 3350 17 G
2 POWDER IN PACKET (EA) ORAL
Status: DISCONTINUED | OUTPATIENT
Start: 2023-08-07 | End: 2023-08-11

## 2023-08-07 RX ORDER — DIVALPROEX SODIUM 500 MG/1
1000 TABLET, EXTENDED RELEASE ORAL AT BEDTIME
Status: DISCONTINUED | OUTPATIENT
Start: 2023-08-07 | End: 2023-08-09

## 2023-08-07 RX ORDER — HALOPERIDOL 5 MG/1
5 TABLET ORAL 2 TIMES DAILY
Status: DISCONTINUED | OUTPATIENT
Start: 2023-08-07 | End: 2023-08-10

## 2023-08-07 RX ORDER — DIPHENHYDRAMINE HCL 50 MG
50 CAPSULE ORAL EVERY 6 HOURS PRN
Status: DISCONTINUED | OUTPATIENT
Start: 2023-08-07 | End: 2023-08-18 | Stop reason: HOSPADM

## 2023-08-07 RX ORDER — LORAZEPAM 1 MG/1
2 TABLET ORAL EVERY 6 HOURS PRN
Status: DISCONTINUED | OUTPATIENT
Start: 2023-08-07 | End: 2023-08-18 | Stop reason: HOSPADM

## 2023-08-07 RX ADMIN — HALOPERIDOL 5 MG: 5 TABLET ORAL at 17:30

## 2023-08-07 RX ADMIN — NICOTINE POLACRILEX 2 MG: 2 LOZENGE ORAL at 14:44

## 2023-08-07 RX ADMIN — NICOTINE POLACRILEX 2 MG: 2 LOZENGE ORAL at 13:44

## 2023-08-07 RX ADMIN — HALOPERIDOL 5 MG: 5 TABLET ORAL at 21:46

## 2023-08-07 RX ADMIN — OLANZAPINE 10 MG: 10 TABLET, FILM COATED ORAL at 09:34

## 2023-08-07 RX ADMIN — NICOTINE POLACRILEX 2 MG: 2 LOZENGE ORAL at 17:22

## 2023-08-07 RX ADMIN — NICOTINE POLACRILEX 2 MG: 2 GUM, CHEWING ORAL at 08:13

## 2023-08-07 RX ADMIN — HALOPERIDOL 5 MG: 5 TABLET ORAL at 14:43

## 2023-08-07 RX ADMIN — NICOTINE POLACRILEX 2 MG: 2 LOZENGE ORAL at 10:35

## 2023-08-07 RX ADMIN — DIVALPROEX SODIUM 1000 MG: 500 TABLET, EXTENDED RELEASE ORAL at 21:46

## 2023-08-07 RX ADMIN — ACETAMINOPHEN 650 MG: 325 TABLET, FILM COATED ORAL at 18:50

## 2023-08-07 ASSESSMENT — ACTIVITIES OF DAILY LIVING (ADL)
ADLS_ACUITY_SCORE: 53
HYGIENE/GROOMING: INDEPENDENT
ADLS_ACUITY_SCORE: 53
ORAL_HYGIENE: INDEPENDENT
LAUNDRY: UNABLE TO COMPLETE
ADLS_ACUITY_SCORE: 53
DRESS: SCRUBS (BEHAVIORAL HEALTH);INDEPENDENT
ADLS_ACUITY_SCORE: 53
HYGIENE/GROOMING: INDEPENDENT
ADLS_ACUITY_SCORE: 53
DRESS: SCRUBS (BEHAVIORAL HEALTH);INDEPENDENT
ADLS_ACUITY_SCORE: 53
ADLS_ACUITY_SCORE: 53
ORAL_HYGIENE: INDEPENDENT
ADLS_ACUITY_SCORE: 53

## 2023-08-07 NOTE — PLAN OF CARE
"  Problem: Adult Behavioral Health Plan of Care  Goal: Patient-Specific Goal (Individualization)  Description: Pt will follow recommendations of treatment team.  Pt will be compliant with medications.  Pt will attend 50 % of groups.  Pt will sleep 6-8 hours each night.   8/6/23: NO wean due to violent and unpredictable behavior.  Treatment team to assess daily   Outcome: Progressing     Pt in [facility]ICU at the start of the shift. Pt pacing in bedroom, ate only 10% of breakfast. Pt requested coffee from staff. Pt reported that he had pain in his foot and stated that he needed his shoes back but when asked to rate his pain, pt stated \"that's for me to know and you to find out\". Nursing asked pt if he needed something for pain, pt stated \"I deal with my pain myself\". Pt requested nicotine gum after breakfast, asked if he could have a lozenge instead of gum. Nursing noticed pt ripping apart pieces of his gum before putting it in his mouth.   Pt asked staff for headphones, staff explained that pt's can't have headphones in the MHICU. Pt yelled who the fuck made up that rule. Pt stated that he needed a prn right now.       Problem: Thought Process Alteration  Goal: Optimal Thought Clarity  Description: Pt will have a logical and linear conversation.  Pt will verbalize 2-3 coping skills     Outcome: Progressing   Goal Outcome Evaluation:         Face to face end of shift report communicated to evening shift RN.     Windy Saha RN  8/7/2023  10:04 AM                    "

## 2023-08-07 NOTE — PROGRESS NOTES
08/06/23 2038   Patient Belongings   Did you bring any home meds/supplements to the hospital?  No   Patient Belongings locker;sent to security per site process   Patient Belongings Put in Hospital Secure Location (Security or Locker, etc.) clothing;shoes;wallet;cell phone/electronics  (Sweatpants, underwear, socks, longsleeve shirt, tshirt, sneakers, baseball hat, lighter, cologne.)   Belongings Search Yes   Clothing Search Yes   Second Staff Anne-Marie     List items sent to safe: IPHONE WITH CRACKS ON SCREEN, PDGA CARD, ORANGE WALLET, PAYPAL CARD, [facility] CARD, 2 [employer] CARDS, 3 [employer] CARDS, PAPERS WITH #S ON IT, RECEIPT, [place] CARD, ONE QUARTER.    All other belongings put in assigned cubby in belongings room.     Blue ring in pt. Belongings.     I have reviewed my belongings list on admission and verify that it is correct.     Patient signature_______________________________    Second staff witness (if patient unable to sign) ______________________________       I have received all my belongings at discharge.    Patient signature________________________________    Cheryl  8/6/2023  8:46 PM

## 2023-08-07 NOTE — PLAN OF CARE
Face to face shift report received from ARIN Kitchen. Rounding completed, pt observed laying in bed at start of shift.    Problem: Adult Behavioral Health Plan of Care  Goal: Patient-Specific Goal (Individualization)  Description: Pt will follow recommendations of treatment team.  Pt will be compliant with medications.  Pt will attend 50 % of groups.  Pt will sleep 6-8 hours each night.   8/6/23: NO wean due to violent and unpredictable behavior.  Treatment team to assess daily       Outcome: Progressing     Problem: Thought Process Alteration  Goal: Optimal Thought Clarity  Description: Pt will have a logical and linear conversation.  Pt will verbalize 2-3 coping skills     Outcome: Progressing   Goal Outcome Evaluation:          Pt. C/O of foot pain today. At the start of the shift, they refused all interventions, stating that they just wanted their shoes. At 1850. Pt. was given PRN Acetaminophen for 2/10 pain in their right foot. Patient's mood was tense this shift, but they were more cooperative with staff. They had some anxiety this shift and were given PRN Haldol at 1730. Pt. ate 100% at dinner and had snacks throughout the shift. After dinner, Pt. had a nap.        Face to face report will be communicated to oncoming RN. Risk for falls will be communicated to next shift.     Kerri Pacheco RN  8/7/2023  9:13 PM

## 2023-08-07 NOTE — DISCHARGE INSTRUCTIONS
Behavioral Discharge Planning and Instructions    Summary: This is a 27 year old male with a PMH of SZAD, bipolar type and polysubstance abuse (THC, meth) who presents in a manic and psychotic episode occurring in the context of medication non-adherence and substance use, namely THC      Main Diagnosis:   1. Schizoaffective disorder, bipolar type, multiple episodes, in acute episode  2. Cannabis use disorder, severe  3. Stimulant (meth) use disorder, severe    Health Care Follow-up:     [facility]  [address], [address]  [place] [address] [phone]  Psychiatry: Nitin Merino- August 21st 5:55pm- Teleahealth    [facility] Hotline  [phone]    [facility]   [phone]        NUMBERS TO CALL IN CRISIS    National Suicide Prevention Lifeline ([facility])  1-142.951.9676    Crisis Text Line ([place])  Text  HOME  to 194832    Mental Health Crisis Line ([place], [place])  261.767.4819    [facility] Mobile Crisis ([place], [place])   [phone]      If you are feeling very unsafe, call 911 or bring yourself to the Emergency Room        Counseling in [place]:  Nitin Mahoney           [phone]  Sammi [facility]      [phone]  [facility]     Kenia Macario, [facility] (young kids)    [phone]   Crystal Mason [facility] (older kids & adults)  [phone]   Alec Alatorre DIOR      [phone]  [facility]       [phone]   Edmundo Torrez MS, LP   Myra Chaudhary MA, LPC   Negin Painter MS psychotherapist   Nargis Xavier MS, LPC   Honey Arteaga DIOR, counselor   Jackie Torrez DIOR, counselor  Shari        [phone]  Wolfgang Whitley, counseling  Dr Consuelo Canela, psychiatry  Penelope Rondon, counseling  Yoni Jo, counseling  Nilda Dean, counseling  Halley Torres, psychiatry   [facility]       [phone]   Josefina Steward MA, LMFT, RPFS   Smita Hammer [facility], [facility]           [phone]  [facility]      [phone]   Jackie Joaquinaon MS, RUST          [phone]        Daysi Egan MSW, LIC , C-ELSY Matthews MSW, LGSW                                                    Pa Calderon LG      Marcelle Crainedis MS, LPC   Veronica Leaders   [facility]                [phone]      Barbra Connor PsyD     Brianda Lowery PsyD, owner      Anju Murphy PsyD    Katelynn Pompa MPAS, PAKARL Sepulveda PhD   Tiffanie Salamanca MSW, [facility]    [facility]       [phone]   Debigalina Thomson [facility]   Harlan Jacobo APRN, CNP,     Hailey Alonso MSW, LGSW (adolescents)   Jackie Grinnel APRN, CNP (Hib via telehealth; adolescents)   Dawna VELÁSQUEZ, CNP (Hib via telehealth)   Taiwo Snatos MA, LPC, BCIA (Hib via telehealth)   Yvette Alvarez [facility]   [facility] MSW, [facility]   Ryann Russell [facility]   Reji Garber DNP, APRN, CNP   Xiao Vieyra RN, BSN   Mariana Holt RN-BC, BSN (Hib via telehealth)  [facility]         [phone]   Betsy Ulloa, MSN, [facility]           [phone]   Yvon Lyles MA, LMFT   Rama Schreiber MS RN [facility] NP   Jackie Wellington, [facility]         [phone]  [facility]       [phone]   [facility]   Kathy Dasilva (to be [facility])   Jacky Bowman (to be [facility])      Counseling in [place]:  [facility]       [phone]   mental health & CD counseling  Taiwo Rey       [phone]  [facility]       [phone]  Denton Lorenzo        [phone]   Josefina Lora  [facility]       [facility]              [phone]   can do weekends and evenings   DeLmelinda Mcallister, MSW, LICSW, LCSW, CCTP    Jair Perea MA, LMFT, [facility]  Levar Toney       evenings, weekends  [phone]      Counseling in [place]:  [facility]         [phone]   Betsy Ulloa, MSN, PMHNP-BC   Libby  IRON Davis, [facility]  [facility]      [phone]  [facility]       [phone]   Rosette Jhon [facility]  [facility]       Sandra Kinney       [phone]  [facility]    [address]     N[facility]      [phone]  [facility]     Yoni Sullivan MS Ed, LMFT    [phone]    (kids) denotes providers who also see kids     Attend all scheduled appointments with your outpatient providers. Call at least 24 hours in advance if you need to reschedule an appointment to ensure continued access to your outpatient providers.     Major Treatments, Procedures and Findings:  You were provided with: a psychiatric assessment, assessed for medical stability, medication evaluation and/or management, group therapy, family therapy, individual therapy, CD evaluation/assessment, milieu management, and medical interventions    Symptoms to Report: feeling more aggressive, increased confusion, losing more sleep, mood getting worse, or thoughts of suicide    Early warning signs can include: increased depression or anxiety sleep disturbances increased thoughts or behaviors of suicide or self-harm  increased unusual thinking, such as paranoia or hearing voices    Safety and Wellness:  Take all medicines as directed.  Make no changes unless your doctor suggests them.      Follow treatment recommendations.  Refrain from alcohol and non-prescribed drugs.  Ask your support system to help you reduce your access to items that could harm yourself or others. Items could include:  Firearms  Medicines (both prescribed and over-the-counter)  Knives and other sharp objects  Ropes and like materials  Car keys  If there is a concern for safety, call 911. If there is a concern for safety, call 911.    Resources:   Crisis Intervention: 400.554.1978 or 807-095-9193 (TTY: 542.374.5680).  Call anytime for help.  National Barney on Mental Illness (www.[place].marisol.org):  "648.794.6851 or 189-467-4687.  [place] Association for Children's Mental Health (www.mac.org): 717.107.6154.  Alcoholics Anonymous (www.alcoholics-anonymous.org): Check your phone book for your local chapter.  Suicide Awareness Voices of Education (SAVE) (www.save.org): 775-386-PQDV (9011)  National Suicide Prevention Line (www.mentalhealthmn.org): 874-120-WQEM (8782)  Mental Health Consumer/Survivor Network of [place] (www.mhcsn.net): 596.435.7579 or 422-477-2493  Mental Health Association of [place] (www.mentalhealth.org): 114.818.5750 or 661-982-5881  Self- Management and Recovery Training., SMART-- Toll free: 645.285.2743  [employer]  Text 4 Life: txt \"LIFE\" to 35078 for immediate support and crisis intervention  Crisis text line: Text \"[place]\" to 801232. Free, confidential, 24/7.  Crisis Intervention: 921.262.6575 or 669-902-7410. Call anytime for help.     General Medication Instructions:   See your medication sheet(s) for instructions.   Take all medicines as directed.  Make no changes unless your doctor suggests them.   Go to all your doctor visits.  Be sure to have all your required lab tests. This way, your medicines can be refilled on time.  Do not use any drugs not prescribed by your doctor.  Avoid alcohol.    Advance Directives:   Scanned document on file with [facility]? No scanned doc  Is document scanned? Pt states no documents  Honoring Choices Your Rights Handout: Informed and given  Was more information offered? Pt declined    The Treatment team has appreciated the opportunity to work with you. If you have any questions or concerns about your recent admission, you can contact the unit which can receive your call 24 hours a day, 7 days a week. They will be able to get in touch with a Provider if needed. The unit number is *** .  "

## 2023-08-07 NOTE — MEDICATION SCRIBE - ADMISSION MEDICATION HISTORY
Medication Scribe Admission Medication History    Admission medication history is complete. The information provided in this note is only as accurate as the sources available at the time of the update.    Medication reconciliation/reorder completed by provider prior to medication history? Yes    Information Source(s): Patient's pharmacy and CareEverywhere/SureScripts via in-person    Pertinent Information:   Patient refused review/refused to rouse. Unable to confirm PTA medications.  No fill hx since April. Most recent medication hx from [facility]. Fill dates documented. Likely non-compliant with medications.     Changes made to PTA medication list:  Added: trazodone, strattera  Deleted: clonidine, cogentin, gabapentin  Changed: updated all according to [facility] records (most recent records available at this time)    Medication Affordability:  Not including over the counter (OTC) medications, was there a time in the past 3 months when you did not take your medications as prescribed because of cost?: Unable to Assess    Allergies reviewed with patient and updates made in EHR: unable to assess    Medication History Completed By: Azalea Ferro 8/7/2023 1:15 PM    Prior to Admission medications    Medication Sig Last Dose Taking? Auth Provider Long Term End Date   atomoxetine (STRATTERA) 60 MG capsule Take 60 mg by mouth daily  Yes Reported, Patient     traZODone (DESYREL) 100 MG tablet Take 1 tablet by mouth At Bedtime  Yes Reported, Patient No    divalproex sodium extended-release (DEPAKOTE ER) 250 MG 24 hr tablet Take 1,250 mg by mouth At Bedtime   Reported, Patient     haloperidol (HALDOL) 10 MG tablet Take 10 mg by mouth 2 times daily   Reported, Patient     hydrOXYzine (VISTARIL) 25 MG capsule Take 50 mg by mouth 3 times daily as needed for anxiety   Reported, Patient     OLANZapine (ZYPREXA) 10 MG tablet Take 10 mg by mouth 2 times daily   Reported, Patient
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
## 2023-08-08 PROCEDURE — 99233 SBSQ HOSP IP/OBS HIGH 50: CPT | Performed by: STUDENT IN AN ORGANIZED HEALTH CARE EDUCATION/TRAINING PROGRAM

## 2023-08-08 PROCEDURE — 250N000013 HC RX MED GY IP 250 OP 250 PS 637: Performed by: STUDENT IN AN ORGANIZED HEALTH CARE EDUCATION/TRAINING PROGRAM

## 2023-08-08 PROCEDURE — 99222 1ST HOSP IP/OBS MODERATE 55: CPT | Performed by: NURSE PRACTITIONER

## 2023-08-08 PROCEDURE — 124N000001 HC R&B MH

## 2023-08-08 PROCEDURE — 250N000013 HC RX MED GY IP 250 OP 250 PS 637: Performed by: NURSE PRACTITIONER

## 2023-08-08 RX ORDER — BENZTROPINE MESYLATE 1 MG/1
1 TABLET ORAL 2 TIMES DAILY PRN
Status: DISCONTINUED | OUTPATIENT
Start: 2023-08-08 | End: 2023-08-18 | Stop reason: HOSPADM

## 2023-08-08 RX ADMIN — HALOPERIDOL 5 MG: 5 TABLET ORAL at 16:04

## 2023-08-08 RX ADMIN — DIVALPROEX SODIUM 1000 MG: 500 TABLET, EXTENDED RELEASE ORAL at 21:04

## 2023-08-08 RX ADMIN — NICOTINE POLACRILEX 2 MG: 2 LOZENGE ORAL at 07:09

## 2023-08-08 RX ADMIN — NICOTINE POLACRILEX 2 MG: 2 LOZENGE ORAL at 17:26

## 2023-08-08 RX ADMIN — NICOTINE POLACRILEX 2 MG: 2 LOZENGE ORAL at 09:04

## 2023-08-08 RX ADMIN — HALOPERIDOL 5 MG: 5 TABLET ORAL at 08:23

## 2023-08-08 RX ADMIN — METFORMIN HYDROCHLORIDE 500 MG: 500 TABLET, FILM COATED ORAL at 16:56

## 2023-08-08 RX ADMIN — NICOTINE POLACRILEX 2 MG: 2 LOZENGE ORAL at 12:26

## 2023-08-08 RX ADMIN — NICOTINE POLACRILEX 2 MG: 2 LOZENGE ORAL at 15:53

## 2023-08-08 RX ADMIN — HALOPERIDOL 5 MG: 5 TABLET ORAL at 21:04

## 2023-08-08 RX ADMIN — HYDROXYZINE HYDROCHLORIDE 50 MG: 25 TABLET, FILM COATED ORAL at 11:00

## 2023-08-08 RX ADMIN — NICOTINE POLACRILEX 2 MG: 2 LOZENGE ORAL at 20:03

## 2023-08-08 ASSESSMENT — ACTIVITIES OF DAILY LIVING (ADL)
ADLS_ACUITY_SCORE: 53
HYGIENE/GROOMING: INDEPENDENT
ADLS_ACUITY_SCORE: 53
LAUNDRY: UNABLE TO COMPLETE
DRESS: SCRUBS (BEHAVIORAL HEALTH);INDEPENDENT
ADLS_ACUITY_SCORE: 53
ORAL_HYGIENE: INDEPENDENT
ADLS_ACUITY_SCORE: 53

## 2023-08-08 NOTE — PROGRESS NOTES
"[facility] PSYCHIATRY  PROGRESS NOTE     SUBJECTIVE     Prior to interviewing the patient, I met with nursing and reviewed patient's clinical condition. We discussed clinical care both before and after the interview. I have reviewed the patient's clinical course by review of records including previous notes, labs, and vital signs.     Per nursing, the patient had the following behavioral events over the last 24-hours: [facility] Took Zyprexa and Haldol prn yesterday. Taking medications as prescribed.    On psychiatric interview, the patient was found in his room walking. He notes that he is doing \"straight\" today. He notes he is a little better, but it is only 1%. He does not want to share what is still wrong.     He denies any problems with his medications. He notes he will get a tremor every so often at home. He denies any currently. The patient notes that his muscles are a little stiff but he is able to use them.    He notes chronic right foot pain from a previous fracture. Discussed shoes possible in the ICU.    He is not interested in adjusting his medications today. No safety concerns.       MEDICATIONS   Scheduled Meds:    divalproex sodium extended-release  1,000 mg Oral At Bedtime     haloperidol  5 mg Oral BID     PRN Meds:.acetaminophen, alum & mag hydroxide-simethicone, LORazepam **AND** diphenhydrAMINE **AND** haloperidol, haloperidol lactate **AND** diphenhydrAMINE **AND** LORazepam, haloperidol, hydrOXYzine, melatonin, nicotine     ALLERGIES   No Known Allergies     MENTAL STATUS EXAM   Vitals: /84 (BP Location: Right arm)   Pulse 53   Temp 97.1  F (36.2  C) (Tympanic)   Resp 18   Wt 59.1 kg (130 lb 6.4 oz)   SpO2 100%   BMI 19.83 kg/m      Appearance: Alert, oriented, dressed in hospital scrubs, thin  Attitude: Guraded  Eye Contact: Limited  Mood: \"Straight, man\"  Affect: Blunted, reduced range   Speech: Normal rate and rhythm   Psychomotor Behavior: No TD or rigidity. No " tremor or akathisia.   Thought Process: Illogical at times, some disorganization   Associations: No loose associations   Thought Content: No SI or HI. No SIB. Denies AVH. Paranoia and other delusional thought (possibly mind-reading) present   Insight: Poor  Judgment: Poor  Oriented to: Person, place, and time  Attention Span and Concentration: Intact  Recent and Remote Memory: Intact  Language: English with appropriate syntax and vocabulary  Fund of Knowledge: Low to average   Muscle Strength and Tone: Grossly normal  Gait and Station: Grossly normal        LABS   No results found for this or any previous visit (from the past 24 hour(s)).      IMPRESSION     This is a 27 year old male with a PMH of SZAD, bipolar type and polysubstance abuse (THC, meth) who presents in a psychotic episode occurring in the context of medication non-adherence and substance use, namely THC. The patient has a history of multiple hospitalization in the past, most recently in January 2023 for a similar presentation. He also has a history of commitment. He has struggled with social and occupational functioning, currently being unemployed and living with his father. He would benefit from hospitalization for management of this acute psychiatric crisis.     In terms of treatment, will start him back on Haldol with plan to use Haldol Dec and Depakote given him doing well on these medications in the past and this being his preference.       DIAGNOSES     1. Schizoaffective disorder, bipolar type, multiple episodes, in acute episode  2. Cannabis use disorder, severe  3. Stimulant (meth) use disorder, unspecified       PLAN     Location: Unit 5  Legal Status: Orders Placed This Encounter      Voluntary    Safety Assessment:    Behavioral Orders   Procedures     Code 1 - Restrict to Unit     Routine Programming     As clinically indicated     Status 15     Every 15 minutes.      PTA psychotropic medications held:      - None, as not taking  any     Previously on Zyprexa 10 mg BID, Trazodone 100 mg at bedtime, and Strattera 60 mg daily not being resumed given not taking and patient not interested. Also, simplifying regimen.     PTA psychotropic medications continued/changed:      - None, as not taking any     New psychotropic medications initiated:      - Haldol 5 mg BID (previously on up to 10 mg BID)  - Depakote 1,000 mg at bedtime (goal dose around 1,250 mg)  - Standard unit prn agents, including Haldol prn agitation    Today's Changes:    - None    Programming: Patient will be treated in a therapeutic milieu with appropriate individual and group therapies. Education will be provided on diagnoses, medications, and treatments.     Medical diagnoses:  Per medicine    #. Prediabetes  - Conservative management (diet, exercise)  - Haldol is low risk of metabolic syndrome  - Consider Metformin     Consult: Nutrition   Tests: None     Anticipated LOS: 5-7 days   Disposition: Home with outpatient services vs IRTS       TREATMENT TEAM CARE PLAN     Progress: Continued symptoms.    Continued Stay Criteria/Rationale: Continued symptoms without sufficient improvement/resolution.    Medical/Physical: See above.    Precautions: See above.     Plan: Continue inpatient care with unit support and medication management.    Rationale for change in precautions or plan: NA due to no change.    Participants: Arsh Wagoner, DO, Nursing, SW, OT.    The patient's care was discussed with the treatment team and chart notes were reviewed.       ATTESTATION      Dr. Arsh Wagoner  Psychiatrist
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
## 2023-08-08 NOTE — PROGRESS NOTES
CLINICAL NUTRITION SERVICES  -  ASSESSMENT NOTE    Josehp Stephens : Provider Order - Low weight in settin of psychosis, R/O malnutrition    27 yom admitted for psychosis. Medical/psych hx includes tobacco abuse, prediabetes (A1C 5.9 on 8/5/23), amphetamine use disorder, depressive disorder, bipolar 1 disorder, schizoaffective disorder. Pt is homeless. Weight loss of 20lbs over the last 7 months. Current intake/appetite is good.    Diet Order: Regular  Intake: 4 meals with 0%, 25%, 100%, 100%    Height: Data Unavailable  Weight: 130 lbs 6.4 oz  Body mass index is 19.83 kg/m .  Weight Status:  Normal BMI  Weight History: 10% loss in 5 months, 13% loss in 7 months  Wt Readings from Last 10 Encounters:   08/05/23 59.1 kg (130 lb 6.4 oz)   06/07/23 61.2 kg (134 lb 14.7 oz)   04/15/23 72.6 kg (160 lb)* Goodreads   01/11/23 68.3 kg (150 lb 9.5 oz)* Goodreads   11/12/22 69 kg (152 lb 3.2 oz)* Goodreads   06/15/22 61.2 kg (135 lb)   08/21/21 58.8 kg (129 lb 11.2 oz)* Goodreads   08/11/21 61.2 kg (135 lb)   03/19/18 65.3 kg (144 lb)   02/01/18 61.7 kg (136 lb)   01/25/18 63.5 kg (140 lb)      Weight used to calculate needs: 59.1kg - current weight  Estimated Energy Needs: 3529-7846 kcals (30-35 Kcal/Kg)   Estimated Protein Needs: 60-70 grams protein (1-1.2 g pro/Kg)    MALNUTRITION:  % Weight Loss:  > 10% in 6 months (severe malnutrition)  % Intake:  </= 75% for >/= 1 month (severe malnutrition)    Malnutrition Diagnosis: Severe malnutrition  In Context of:  Chronic illness or disease  Environmental or social circumstances    NUTRITION DIAGNOSIS:  Malnutrition related to inadequate energy/protein intake as evidenced by 13% weight loss in 7 months    NUTRITION RECOMMENDATIONS  - Encourage intake during meal times  - Monitor weight    MONITORING AND EVALUATION:  RD will monitor intake, weight, labs

## 2023-08-08 NOTE — PLAN OF CARE
"  Problem: Adult Behavioral Health Plan of Care  Goal: Absence of New-Onset Illness or Injury  Outcome: Progressing     Problem: Thought Process Alteration  Goal: Optimal Thought Clarity  Description: Pt will have a logical and linear conversation.  Pt will verbalize 2-3 coping skills     Outcome: Progressing    15:45 - face to face end of shift report has been received from day shift rn pt observed pacing in MHicu.  16:00 - pt reports having anxiety, requests haldol, denies hallucinations or depression. Medicated with Haldol 5mg po. Pt endorses pain in foot but states is chronic pain and declines any prn for pain. States \" I could get addicted to tylenol\". Cooperative with getting vital signs taken and then back to pacing in the MH lobby.  21:00 - took bedtime meds as prescribed.  Cooperative, lying on bed in his room. Weaned out once during this shift for approx 15 minutes before he requested to go back to the mhicu.    22:15 - appears asleep in his room, lying on bed with eyes closed and respirations non labored.  Face to face end of shift report will be communicated to night shift rn.                          "

## 2023-08-08 NOTE — H&P
"[facility]    History and Physical  Medical Services       Date of Admission:  8/5/2023  Date of Service (when I saw the patient): 08/08/23    Assessment & Plan     Principal Problem:    Psychosis (H)    Active Medical Problems:    Agitation    Paranoia (H)    Prediabetes- A1c 5.9. Attempted to discuss medications and prediabetes diagnosis but pt stated \"I don't want to talk about that right now\". Although this was his response to most topics. Will trail metformin and see if he tolerates it.     Chronic right foot pain- reports he had a previous fracture. He initially stated he did not want to talk about it but then admitted it was from doing a front flip. Full range of motion noted. No edema, no erythema. OT is working on getting him some unit appropriate shoes. Tylenol as needed.     Severe malnutrition- Malnutrition Diagnosis:  Per RD   In Context of:  Chronic illness or disease  Environmental or social circumstances     NUTRITION DIAGNOSIS:  Malnutrition related to inadequate energy/protein intake as evidenced by 13% weight loss in 7 months     NUTRITION RECOMMENDATIONS  - Encourage intake during meal times  - Monitor weight     MONITORING AND EVALUATION:  RD will monitor intake, weight, labs    Pt medically stable, no acute medical concerns. Chronic medical problems stable. Will sign off. Please consult for any new medical issues or concerns.         Code Status: Full Code    Ryann Mancuso CNP    Primary Care Physician   Jacky Agustin    Chief Complaint   Psych evaluation     History is obtained from the patient and electronic health record    History of Present Illness   (Per ED) Joseph Stephens is a 27 year old individual with history of bipolar 1 disorder, psychosis, depressive disorder, schizoaffective disorder, amphetamine use disorder, is brought in by law enforcement for psychiatric evaluation.Patient states that he had his father called the police because he does not feel safe at " "home.  States he is having difficulty \"handling his emotions\".  States that this has been going on his whole life.  States he has been off his psychiatric medications for 3-1/2 years.  Patient states he wants to be admitted to [facility] to have help.    He denies any other recreational drug use or alcohol use.  Denies suicidal or homicidal ideation.  Denies hallucinations.    Past Medical History    I have reviewed this patient's medical history and updated it with pertinent information if needed.   No past medical history on file.    Past Surgical History   I have reviewed this patient's surgical history and updated it with pertinent information if needed.  Past Surgical History:   Procedure Laterality Date    NO HISTORY OF SURGERY         Prior to Admission Medications   Prior to Admission Medications   Prescriptions Last Dose Informant Patient Reported? Taking?   OLANZapine (ZYPREXA) 10 MG tablet   Yes No   Sig: Take 10 mg by mouth 2 times daily   atomoxetine (STRATTERA) 60 MG capsule   Yes Yes   Sig: Take 60 mg by mouth daily   divalproex sodium extended-release (DEPAKOTE ER) 250 MG 24 hr tablet   Yes No   Sig: Take 1,250 mg by mouth At Bedtime   haloperidol (HALDOL) 10 MG tablet   Yes No   Sig: Take 10 mg by mouth 2 times daily   hydrOXYzine (VISTARIL) 25 MG capsule   Yes No   Sig: Take 50 mg by mouth 3 times daily as needed for anxiety   traZODone (DESYREL) 100 MG tablet   Yes Yes   Sig: Take 1 tablet by mouth At Bedtime      Facility-Administered Medications: None     Allergies   No Known Allergies    Social History   I have reviewed this patient's social history and updated it with pertinent information if needed. Joseph Stephens  reports that he has been smoking cigarettes. He has a 9.00 pack-year smoking history. He has never used smokeless tobacco. He reports current alcohol use. He reports current drug use. Drug: Marijuana.    Family History   I have reviewed this patient's family history and updated it " with pertinent information if needed.   Family History   Problem Relation Age of Onset    Neurologic Disorder Maternal Grandmother        Review of Systems   CONSTITUTIONAL:  negative  EYES:  negative  HEENT:  negative  RESPIRATORY:  negative  CARDIOVASCULAR:  negative  GASTROINTESTINAL:  negative  GENITOURINARY:  negative  INTEGUMENT/BREAST:  negative  HEMATOLOGIC/LYMPHATIC:  negative  ALLERGIC/IMMUNOLOGIC:  negative  ENDOCRINE:  negative  MUSCULOSKELETAL:  negative except chronic right foot/ankle pain   NEUROLOGICAL:  negative    Physical Exam   Temp: 97.1  F (36.2  C) Temp src: Tympanic BP: 138/84 Pulse: 53   Resp: 18 SpO2: 100 % O2 Device: None (Room air)    Vital Signs with Ranges  Temp:  [97.1  F (36.2  C)-97.8  F (36.6  C)] 97.1  F (36.2  C)  Pulse:  [53-71] 53  Resp:  [16-18] 18  BP: (135-138)/(74-84) 138/84  SpO2:  [99 %-100 %] 100 %  130 lbs 6.4 oz    Constitutional: awake, alert, cooperative, no apparent distress, and appears stated age, disheveled, vitals stable   Eyes: Lids and lashes normal, pupils equal, round and reactive to light, extra ocular muscles intact, sclera clear, conjunctiva normal  ENT: Normocephalic, without obvious abnormality, atraumatic,external ears without lesions, oral pharynx with moist mucous membranes, no erythema or exudates  Hematologic / Lymphatic: no cervical lymphadenopathy  Respiratory: No increased work of breathing, good air exchange, clear to auscultation bilaterally, no crackles or wheezing  Cardiovascular: Normal apical impulse, regular rate and rhythm, normal S1 and S2, no S3 or S4, and no murmur noted  GI: normal bowel sounds, soft, non-distended, non-tender, no masses palpated, no hepatosplenomegally  Genitounirinary: deferred  Skin: normal skin color, texture, turgor and no redness, warmth, or swelling  Musculoskeletal: There is no redness, warmth, or swelling of the joints.  Full range of motion noted.  Motor strength is 5 out of 5 all extremities bilaterally.   Tone is normal.  Neurologic: Awake, alert, oriented to name, place and time.  Neuropsychiatric: General: disorganized, rambling incomprehensible at times, calm, and fair eye contact    Data   Data reviewed today:   Recent Labs   Lab 08/05/23 2012   WBC 9.0   HGB 15.2   MCV 84         POTASSIUM 3.5   CHLORIDE 101   CO2 21*   BUN 5.4*   CR 0.75   ANIONGAP 14   JERALD 9.2   *   ALBUMIN 4.7   PROTTOTAL 7.5   BILITOTAL 0.4   ALKPHOS 68   ALT 11   AST 16       No results found for this or any previous visit (from the past 24 hour(s)).

## 2023-08-08 NOTE — PLAN OF CARE
Face to face report received from Tia RN. Pt. Observed.    Problem: Adult Behavioral Health Plan of Care  Goal: Patient-Specific Goal (Individualization)  Description: Pt will follow recommendations of treatment team.  Pt will be compliant with medications.  Pt will attend 50 % of groups.  Pt will sleep 6-8 hours each night.   8/6/23: NO wean due to violent and unpredictable behavior.  Treatment team to assess daily       Outcome: Progressing   Pt has been in bed with eyes closed and regular respirations for a total of 4 hours this noc shift. Pt. Up pacing in MH-ICU duran on and off. 15 minute and PRN checks all night. No complaints offered.     Face to face end of shift report communicated to oncoming RN.     Vashti Moreno RN  8/8/2023

## 2023-08-09 PROCEDURE — 124N000001 HC R&B MH

## 2023-08-09 PROCEDURE — 250N000013 HC RX MED GY IP 250 OP 250 PS 637: Performed by: NURSE PRACTITIONER

## 2023-08-09 PROCEDURE — 99233 SBSQ HOSP IP/OBS HIGH 50: CPT | Mod: 95 | Performed by: STUDENT IN AN ORGANIZED HEALTH CARE EDUCATION/TRAINING PROGRAM

## 2023-08-09 PROCEDURE — 250N000013 HC RX MED GY IP 250 OP 250 PS 637: Performed by: STUDENT IN AN ORGANIZED HEALTH CARE EDUCATION/TRAINING PROGRAM

## 2023-08-09 RX ORDER — DIVALPROEX SODIUM 500 MG/1
1500 TABLET, EXTENDED RELEASE ORAL AT BEDTIME
Status: DISCONTINUED | OUTPATIENT
Start: 2023-08-09 | End: 2023-08-09

## 2023-08-09 RX ADMIN — NICOTINE POLACRILEX 2 MG: 2 LOZENGE ORAL at 17:20

## 2023-08-09 RX ADMIN — NICOTINE POLACRILEX 2 MG: 2 LOZENGE ORAL at 18:26

## 2023-08-09 RX ADMIN — HALOPERIDOL 5 MG: 5 TABLET ORAL at 20:01

## 2023-08-09 RX ADMIN — NICOTINE POLACRILEX 2 MG: 2 LOZENGE ORAL at 10:30

## 2023-08-09 RX ADMIN — METFORMIN HYDROCHLORIDE 500 MG: 500 TABLET, FILM COATED ORAL at 16:13

## 2023-08-09 RX ADMIN — NICOTINE POLACRILEX 2 MG: 2 LOZENGE ORAL at 19:15

## 2023-08-09 RX ADMIN — HALOPERIDOL 5 MG: 5 TABLET ORAL at 08:47

## 2023-08-09 RX ADMIN — NICOTINE POLACRILEX 2 MG: 2 LOZENGE ORAL at 07:15

## 2023-08-09 RX ADMIN — HYDROXYZINE HYDROCHLORIDE 50 MG: 25 TABLET, FILM COATED ORAL at 11:53

## 2023-08-09 RX ADMIN — NICOTINE POLACRILEX 2 MG: 2 LOZENGE ORAL at 12:45

## 2023-08-09 RX ADMIN — NICOTINE POLACRILEX 2 MG: 2 LOZENGE ORAL at 14:45

## 2023-08-09 RX ADMIN — NICOTINE POLACRILEX 2 MG: 2 LOZENGE ORAL at 11:45

## 2023-08-09 RX ADMIN — NICOTINE POLACRILEX 2 MG: 2 LOZENGE ORAL at 06:04

## 2023-08-09 RX ADMIN — NICOTINE POLACRILEX 2 MG: 2 LOZENGE ORAL at 15:50

## 2023-08-09 RX ADMIN — BENZTROPINE MESYLATE 1 MG: 1 TABLET ORAL at 19:15

## 2023-08-09 RX ADMIN — DIVALPROEX SODIUM 1250 MG: 250 TABLET, FILM COATED, EXTENDED RELEASE ORAL at 20:01

## 2023-08-09 RX ADMIN — NICOTINE POLACRILEX 2 MG: 2 LOZENGE ORAL at 08:17

## 2023-08-09 ASSESSMENT — ACTIVITIES OF DAILY LIVING (ADL)
ADLS_ACUITY_SCORE: 53
ADLS_ACUITY_SCORE: 53
ORAL_HYGIENE: INDEPENDENT
ADLS_ACUITY_SCORE: 53
DRESS: INDEPENDENT
ADLS_ACUITY_SCORE: 53
LAUNDRY: UNABLE TO COMPLETE
ADLS_ACUITY_SCORE: 53
HYGIENE/GROOMING: INDEPENDENT
ADLS_ACUITY_SCORE: 53
HYGIENE/GROOMING: INDEPENDENT
ADLS_ACUITY_SCORE: 53
DRESS: SCRUBS (BEHAVIORAL HEALTH);INDEPENDENT
ADLS_ACUITY_SCORE: 53
ORAL_HYGIENE: INDEPENDENT
ADLS_ACUITY_SCORE: 53
ADLS_ACUITY_SCORE: 53

## 2023-08-09 NOTE — PLAN OF CARE
"  Problem: Adult Behavioral Health Plan of Care  Goal: Patient-Specific Goal (Individualization)  Description: Pt will follow recommendations of treatment team.  Pt will be compliant with medications.  Pt will attend 50 % of groups.  Pt will sleep 6-8 hours each night.   8/6/23: NO wean due to violent and unpredictable behavior.  Treatment team to assess daily    Outcome: Progressing     Pt in MHICU this shift. Pt denied pain, depression, SI, HI and hallucinations. When asked if he had any anxiety, pt responded \"obviously\". Pt given hydroxyzine 50mg at 1153. Pt refused to wean to the open unit stating \"I don't go to groups unless I'm interested in what they are doing\".  Pt's mom called this afternoon, pt had not signed an ANISH and nursing told her that we couldn't share info. Pt was asked if he wanted to sign an ANISH for his mom, pt stated loudly \"no I don't\". Pt said he would call her himself. Pt given number and pt left a message on her phone.             Problem: Thought Process Alteration  Goal: Optimal Thought Clarity  Description: Pt will have a logical and linear conversation.  Pt will verbalize 2-3 coping skills     Outcome: Progressing   Goal Outcome Evaluation:    Plan of Care Reviewed With: patient          Face to face end of shift report communicated to evening shift RN.     Windy Saha RN  8/9/2023  7:49 AM              "

## 2023-08-09 NOTE — PLAN OF CARE
"Face to face shift report received from Fidelina CAPONE RN. Rounding completed, pt observed.    Problem: Adult Behavioral Health Plan of Care  Goal: Patient-Specific Goal (Individualization)  Description: Pt will follow recommendations of treatment team.  Pt will be compliant with medications.  Pt will attend 50 % of groups.  Pt will sleep 6-8 hours each night.   May wear crocs in MHICU r/t foot issues      8/9/23: May wean if behavior is appropriate.    Treatment team to assess daily       8/9/2023 1639 by Aure Hicks RN  Outcome: Progressing  Note: Shift Summary:  Patient remains in a room in the MHICU r/t the need for decreased stimulation.  Patient has weaned to open unit as tolerated.  Paces in hallway listening to radio headphones.  States he would like to go to the open unit to a room.  Nurse explained that decision is made by the treatment team. Patient asked what \"my plan will be\".  Encouraged to ask his SW as no clear plan charted. Nurse noticed patient pacing a lot even when in MHICU.  He did request \"a med\" Offered and accepted Cogentin 1 mg pp with good effect as patient stated it helped his restlessness.  Compliant with .scheduled HS meds.    Problem: Thought Process Alteration  Goal: Optimal Thought Clarity  Description: Pt will have a logical and linear conversation.  Pt will verbalize 2-3 coping skills     8/9/2023 1639 by Aure Hicks RN  Outcome: Progressing  8/9/2023 1558 by Aure Hicks RN  Outcome: Progressing    Face to face report will be communicated to oncoming RN.    Aure Hicks RN  8/9/2023                          "

## 2023-08-09 NOTE — PLAN OF CARE
Face to face shift report received from Fidelina CAPONE RN. Rounding completed, pt observed.  Fidelina CAPONE RN  Problem: Adult Behavioral Health Plan of Care  Goal: Patient-Specific Goal (Individualization)  Description: Pt will follow recommendations of treatment team.  Pt will be compliant with medications.  Pt will attend 50 % of groups.  Pt will sleep 6-8 hours each night.   8/6/23: NO wean due to violent and unpredictable behavior.  Treatment team to assess daily       Outcome: Progressing  Note: Shift Summary:     Problem: Thought Process Alteration  Goal: Optimal Thought Clarity  Description: Pt will have a logical and linear conversation.  Pt will verbalize 2-3 coping skills     Outcome: Progressing  Face to face report will be communicated to oncoming RN.    Aure Hicks RN  8/9/2023

## 2023-08-09 NOTE — PLAN OF CARE
Face to face report received from Patricia HINKLE. Pt. Observed.    Problem: Adult Behavioral Health Plan of Care  Goal: Patient-Specific Goal (Individualization)  Description: Pt will follow recommendations of treatment team.  Pt will be compliant with medications.  Pt will attend 50 % of groups.  Pt will sleep 6-8 hours each night.   8/6/23: NO wean due to violent and unpredictable behavior.  Treatment team to assess daily       Outcome: Progressing   Pt has been in bed with eyes closed and regular respirations for a total of 5 hours this noc shift. Pt. Out to MH-ICU lounge pacing on and off. 15 minute and PRN checks all night. No complaints offered.      Face to face end of shift report communicated to oncjorge a RN.     Vashti Moreno RN  8/9/2023

## 2023-08-09 NOTE — PROGRESS NOTES
"[facility] PSYCHIATRY  PROGRESS NOTE     SUBJECTIVE     Prior to interviewing the patient, I met with nursing and reviewed patient's clinical condition. We discussed clinical care both before and after the interview. I have reviewed the patient's clinical course by review of records including previous notes, labs, and vital signs.     Per nursing, the patient had the following behavioral events over the last 24-hours: Continues in [facility]. Taking medication as prescribed.    On psychiatric interview, the patient was found in his room walking. He notes that he is doing \"aight.\" He notes that he is just \"chillin\" The patient notes that he is not feeling as well today. He notes that he is 0% better. He is not willing to share. He notes that he feels comfortable. He does not elicit any EPSE.    The patient notes that the crocks seem to be helping. He notes that his right foot is more comfortable.    The patient consents to increasing Depakote after discussing risks and benefits.    He has no plans to hurt himself or others. He feels safe in the hospital.       MEDICATIONS   Scheduled Meds:    divalproex sodium extended-release  1,000 mg Oral At Bedtime     haloperidol  5 mg Oral BID     metFORMIN  500 mg Oral Daily with supper     PRN Meds:.acetaminophen, alum & mag hydroxide-simethicone, benztropine, LORazepam **AND** diphenhydrAMINE **AND** haloperidol, haloperidol lactate **AND** diphenhydrAMINE **AND** LORazepam, haloperidol, hydrOXYzine, melatonin, nicotine     ALLERGIES   No Known Allergies     MENTAL STATUS EXAM   Vitals: /64 (BP Location: Right arm)   Pulse 74   Temp 97.9  F (36.6  C) (Tympanic)   Resp 14   Wt 59.1 kg (130 lb 6.4 oz)   SpO2 98%   BMI 19.83 kg/m      Appearance: Alert, oriented, dressed in hospital scrubs, thin  Attitude: Guraded  Eye Contact: Limited  Mood: \"Aight\"  Affect: Blunted, reduced range   Speech: Normal rate and rhythm   Psychomotor Behavior: No TD or rigidity. No tremor " or akathisia.   Thought Process: Illogical at times, some disorganization   Associations: No loose associations   Thought Content: No SI or HI. No SIB. Denies AVH. Paranoia and other delusional thought (possibly mind-reading) present   Insight: Poor  Judgment: Poor  Oriented to: Person, place, and time  Attention Span and Concentration: Intact  Recent and Remote Memory: Intact  Language: English with appropriate syntax and vocabulary  Fund of Knowledge: Low to average   Muscle Strength and Tone: Some rigidity  Gait and Station: Grossly normal        LABS   No results found for this or any previous visit (from the past 24 hour(s)).      IMPRESSION     This is a 27 year old male with a PMH of SZAD, bipolar type and polysubstance abuse (THC, meth) who presents in a psychotic episode occurring in the context of medication non-adherence and substance use, namely THC. The patient has a history of multiple hospitalization in the past, most recently in January 2023 for a similar presentation. He also has a history of commitment. He has struggled with social and occupational functioning, currently being unemployed and living with his father. He would benefit from hospitalization for management of this acute psychiatric crisis.     In terms of treatment, will start him back on Haldol with plan to use Haldol Dec and Depakote given him doing well on these medications in the past and this being his preference.    Today: Patient continues to be psychotic. Increasing VPA to PTA dose. Will order level and increase if needed be. Will increase Haldol if needed. Not increasing yet given some rigidity yesterday.       DIAGNOSES     1. Schizoaffective disorder, bipolar type, multiple episodes, in acute episode  2. Cannabis use disorder, severe  3. Stimulant (meth) use disorder, unspecified       PLAN     Location: Unit 5  Legal Status: Orders Placed This Encounter      Voluntary    Safety Assessment:    Behavioral Orders   Procedures      Code 1 - Restrict to Unit     Routine Programming     As clinically indicated     Status 15     Every 15 minutes.      PTA psychotropic medications held:      - None, as not taking any     Previously on Zyprexa 10 mg BID, Trazodone 100 mg at bedtime, and Strattera 60 mg daily not being resumed given not taking and patient not interested. Also, simplifying regimen.     PTA psychotropic medications continued/changed:      - None, as not taking any     New psychotropic medications initiated:      - Haldol 5 mg BID (previously on up to 10 mg BID)  - Depakote 1,000 mg at bedtime -> 1,250 mg at bedtime as of 8/9  - Standard unit prn agents, including Haldol prn agitation    Today's Changes:    - Increase VPA to 1,250 mg at bedtime   - Continue to wean out. May move out of [facility] today if needed.     Programming: Patient will be treated in a therapeutic milieu with appropriate individual and group therapies. Education will be provided on diagnoses, medications, and treatments.     Medical diagnoses:  Per medicine    #. Prediabetes  - Conservative management (diet, exercise)  - Haldol is low risk of metabolic syndrome  - Metformin 500 mg at bedtime started      Consult: Nutrition   Tests: VPA on 8/12     Anticipated LOS: > 7 days   Disposition: Home with outpatient services vs IRTS       ATTESTATION      Dr. Arsh Wagoner  Psychiatrist    Video Visit: Patient has given verbal consent for video visit?: Yes  Type of Service: video visit for mental health treatment  [place] Visit: [place]  Originating Site (patient location): [facility]  Distant Site (provider location): Remote Location  Mode of Communication: Video Conference via [employer]  Time of Service: Date: 08/09/2023 Start: 745 end: 348

## 2023-08-10 PROCEDURE — 250N000013 HC RX MED GY IP 250 OP 250 PS 637: Performed by: NURSE PRACTITIONER

## 2023-08-10 PROCEDURE — 99233 SBSQ HOSP IP/OBS HIGH 50: CPT | Mod: 95 | Performed by: STUDENT IN AN ORGANIZED HEALTH CARE EDUCATION/TRAINING PROGRAM

## 2023-08-10 PROCEDURE — 124N000001 HC R&B MH

## 2023-08-10 PROCEDURE — 250N000013 HC RX MED GY IP 250 OP 250 PS 637: Performed by: STUDENT IN AN ORGANIZED HEALTH CARE EDUCATION/TRAINING PROGRAM

## 2023-08-10 RX ORDER — BENZTROPINE MESYLATE 0.5 MG/1
0.5 TABLET ORAL 2 TIMES DAILY
Status: DISCONTINUED | OUTPATIENT
Start: 2023-08-10 | End: 2023-08-11

## 2023-08-10 RX ORDER — HALOPERIDOL 10 MG/1
10 TABLET ORAL AT BEDTIME
Status: DISCONTINUED | OUTPATIENT
Start: 2023-08-10 | End: 2023-08-11

## 2023-08-10 RX ADMIN — NICOTINE POLACRILEX 2 MG: 2 LOZENGE ORAL at 22:42

## 2023-08-10 RX ADMIN — BENZTROPINE MESYLATE 0.5 MG: 0.5 TABLET ORAL at 09:19

## 2023-08-10 RX ADMIN — NICOTINE POLACRILEX 2 MG: 2 LOZENGE ORAL at 20:24

## 2023-08-10 RX ADMIN — NICOTINE POLACRILEX 2 MG: 2 LOZENGE ORAL at 15:40

## 2023-08-10 RX ADMIN — METFORMIN HYDROCHLORIDE 500 MG: 500 TABLET, FILM COATED ORAL at 16:36

## 2023-08-10 RX ADMIN — NICOTINE POLACRILEX 2 MG: 2 LOZENGE ORAL at 05:57

## 2023-08-10 RX ADMIN — NICOTINE POLACRILEX 2 MG: 2 LOZENGE ORAL at 12:15

## 2023-08-10 RX ADMIN — NICOTINE POLACRILEX 2 MG: 2 LOZENGE ORAL at 17:40

## 2023-08-10 RX ADMIN — NICOTINE POLACRILEX 2 MG: 2 LOZENGE ORAL at 10:19

## 2023-08-10 RX ADMIN — NICOTINE POLACRILEX 2 MG: 2 LOZENGE ORAL at 07:45

## 2023-08-10 RX ADMIN — DIVALPROEX SODIUM 1250 MG: 250 TABLET, FILM COATED, EXTENDED RELEASE ORAL at 20:22

## 2023-08-10 RX ADMIN — BENZTROPINE MESYLATE 1 MG: 1 TABLET ORAL at 12:43

## 2023-08-10 RX ADMIN — NICOTINE POLACRILEX 2 MG: 2 LOZENGE ORAL at 13:17

## 2023-08-10 RX ADMIN — NICOTINE POLACRILEX 2 MG: 2 LOZENGE ORAL at 19:19

## 2023-08-10 RX ADMIN — BENZTROPINE MESYLATE 0.5 MG: 0.5 TABLET ORAL at 20:22

## 2023-08-10 RX ADMIN — NICOTINE POLACRILEX 2 MG: 2 LOZENGE ORAL at 21:43

## 2023-08-10 RX ADMIN — HALOPERIDOL 10 MG: 10 TABLET ORAL at 20:23

## 2023-08-10 RX ADMIN — NICOTINE POLACRILEX 2 MG: 2 LOZENGE ORAL at 14:39

## 2023-08-10 RX ADMIN — NICOTINE POLACRILEX 2 MG: 2 LOZENGE ORAL at 09:19

## 2023-08-10 ASSESSMENT — ACTIVITIES OF DAILY LIVING (ADL)
ORAL_HYGIENE: INDEPENDENT
ADLS_ACUITY_SCORE: 53
DRESS: INDEPENDENT
ADLS_ACUITY_SCORE: 53
HYGIENE/GROOMING: INDEPENDENT
ADLS_ACUITY_SCORE: 53
ADLS_ACUITY_SCORE: 53

## 2023-08-10 NOTE — PROGRESS NOTES
Placed a call to [facility] to check on their referral and they said it has not been put in the system yet and to check back later today.

## 2023-08-10 NOTE — PLAN OF CARE
Face to face shift report received from Vashti HINKLE. Rounding completed, pt observed in MHICU at the start of the shift.    Patient moved out to open unit this shift. Alert and making needs known. Quiet during conversation ignoring this writer at times. Blunted affect. Improved mood in the afternoon when moved to the open unit. Eating well for meals. Compliant with scheduled medication and nursing assessment. Denies anxiety, depression, hallucinations, SI/HI, and pain today. Accepted new order of Cogentin 0.5 mg this morning. Requested and received Cogentin 1 mg at 1243 for ongoing restlessness with adequate relief. Spent the afternoon walking in the hallways listening to music. More sociable with peers laughing and working on a puzzle. Requested nicotine lozenges throughout the day.    Problem: Adult Behavioral Health Plan of Care  Goal: Patient-Specific Goal (Individualization)  Description: Pt will follow recommendations of treatment team.  Pt will be compliant with medications.  Pt will attend 50 % of groups.  Pt will sleep 6-8 hours each night.       Outcome: Progressing     Problem: Thought Process Alteration  Goal: Optimal Thought Clarity  Description: Pt will have a logical and linear conversation.  Pt will verbalize 2-3 coping skills     Outcome: Progressing     Problem: Suicidal Behavior  Goal: Suicidal Behavior is Absent or Managed  Description: Patient will remain free of self harm while on the unit.  Patient will be able to list 2 coping skills by discharge.  Patient will inform staff of suicidal thoughts while on the unit.   Outcome: Progressing    Face to face report will be communicated to oncoming ARIN.    Molly Parrish RN  8/10/2023

## 2023-08-10 NOTE — PLAN OF CARE
"Face to face shift report received from Molly ARREDONDO RN. Rounding completed, pt observed.    Problem: Adult Behavioral Health Plan of Care  Goal: Patient-Specific Goal (Individualization)  Description: Pt will follow recommendations of treatment team.  Pt will be compliant with medications.  Pt will attend 50 % of groups.  Pt will sleep 6-8 hours each night.       Outcome: Progressing  Note: Shift Summary: Patient was up walking in hallway listening to radio headphones.  Patient is cooperative with assessment.  States that he likes taking the Cogentin as \"it helps with the Haldol\".  Patient tells nurse how he likes to Rap and won a contest once for rapping.  He also takes nurse around the unit to look at the pictures on the walls.  We looked at a picture of a lake and he informed nurse that it must be a sunset vs sunrise as there was no fog on the lake.  Patient is able to easily make his needs known to staff.    2055:  Patient compliant with scheduled HS medications.  Patient informs nurse that he \"won't be sleeping tonight.  I can't sleep in a room with that jasen.  I don't care about him\".  Patient was becoming elevated and nurse encouraged him to calm himself and that we would try to move him.  Patient stated \"I won't be no trouble\".  Patient moved to room 510.    Problem: Thought Process Alteration  Goal: Optimal Thought Clarity  Description: Pt will have a logical and linear conversation.  Pt will verbalize 2-3 coping skills     Outcome: Progressing     Problem: Suicidal Behavior  Goal: Suicidal Behavior is Absent or Managed  Description: Patient will remain free of self harm while on the unit.  Patient will be able to list 2 coping skills by discharge.  Patient will inform staff of suicidal thoughts while on the unit.   Outcome: Progressing  Face to face report will be communicated to oncoming ARIN.    Aure Hicks RN  8/10/2023                      "

## 2023-08-10 NOTE — PLAN OF CARE
Face to face report received from Aure HINKLE. Pt. Observed.    Problem: Adult Behavioral Health Plan of Care  Goal: Patient-Specific Goal (Individualization)  Description: Pt will follow recommendations of treatment team.  Pt will be compliant with medications.  Pt will attend 50 % of groups.  Pt will sleep 6-8 hours each night.   May wear crocs in MHICU r/t foot issues      8/9/23: May wean if behavior is appropriate.      Treatment team to assess daily       Outcome: Progressing  Pt has been in bed with eyes closed and regular respirations x 5 hours this noc shift. 15 minute and PRN checks all night. No complaints offered. Will continue to monitor.       Face to face end of shift report communicated to oncoming RN.     Vashti Moreno RN  8/10/2023

## 2023-08-10 NOTE — PROGRESS NOTES
"[facility] PSYCHIATRY  PROGRESS NOTE     SUBJECTIVE     Prior to interviewing the patient, I met with nursing and reviewed patient's clinical condition. We discussed clinical care both before and after the interview. I have reviewed the patient's clinical course by review of records including previous notes, labs, and vital signs.     Per nursing, the patient had the following behavioral events over the last 24-hours: Continues in [facility]. Taking medication as prescribed. Did receive a Cogentin yesterday for some restlessness.     On psychiatric interview, the patient was found in the lounge. He notes that he is doing alright today. He notes that he is doing well with the increase in Depakote. The patient notes that he is worried about Depakote in terms of blood levels. He notes that it \"destroys his levels.\" The patient notes that his mood feels uneasy. He notes that he is waiting for it to work more.     The patient notes that he feels comfortable. He denies feeling restless. The patient notes that he felt calmer with Cogentin. He would like to take this medication though. He notes that it helps with his ADHD and then feeling calmer. He notes that he does not believe in \"pacing.\" The patient notes that he would like to take Cogentin for the time being. He consents to taking Cogentin after discussing B/R/SE, including sedation, weight gain, dizziness, and confusion.     The patient notes that he feels \"great overall.\"    He consents to changing Haldol to evening dosing given previously did this.    He denies any problems with Metformin.     He would like to come out of the back today.       MEDICATIONS   Scheduled Meds:    divalproex sodium extended-release  1,250 mg Oral At Bedtime     haloperidol  5 mg Oral BID     metFORMIN  500 mg Oral Daily with supper     PRN Meds:.acetaminophen, alum & mag hydroxide-simethicone, benztropine, LORazepam **AND** diphenhydrAMINE **AND** haloperidol, haloperidol lactate **AND** " "diphenhydrAMINE **AND** LORazepam, haloperidol, hydrOXYzine, melatonin, nicotine     ALLERGIES   No Known Allergies     MENTAL STATUS EXAM   Vitals: /74 (BP Location: Right arm)   Pulse 72   Temp 97.2  F (36.2  C) (Tympanic)   Resp 14   Wt 59.1 kg (130 lb 6.4 oz)   SpO2 97%   BMI 19.83 kg/m      Appearance: Alert, oriented, dressed in hospital scrubs, thin  Attitude: Guraded  Eye Contact: Limited  Mood: \"Fine\"  Affect: Blunted, reduced range   Speech: Normal rate and rhythm   Psychomotor Behavior: No TD or rigidity. Some akathisia present.  Thought Process: Illogical at times, some disorganization   Associations: No loose associations   Thought Content: No SI or HI. No SIB. Denies AVH. Paranoia and other delusional thought (possibly mind-reading) present, although less  Insight: Poor  Judgment: Poor  Oriented to: Person, place, and time  Attention Span and Concentration: Intact  Recent and Remote Memory: Intact  Language: English with appropriate syntax and vocabulary  Fund of Knowledge: Low to average   Muscle Strength and Tone: Some rigidity  Gait and Station: Grossly normal        LABS   No results found for this or any previous visit (from the past 24 hour(s)).      IMPRESSION     This is a 27 year old male with a PMH of SZAD, bipolar type and polysubstance abuse (THC, meth) who presents in a psychotic episode occurring in the context of medication non-adherence and substance use, namely THC. The patient has a history of multiple hospitalization in the past, most recently in January 2023 for a similar presentation. He also has a history of commitment. He has struggled with social and occupational functioning, currently being unemployed and living with his father. He would benefit from hospitalization for management of this acute psychiatric crisis.     In terms of treatment, will start him back on Haldol with plan to use Haldol Dec and Depakote given him doing well on these medications in the past " and this being his preference.    Today: Patient is showing some signs of improvement, being less irritable and guarded. He is having some akathisia and rigidity from Haldol with Cogentin being scheduled, given him finding this helpful. Will switch Haldol to evening dose. Might lower dose if EPSE becomes too problematic. Doing well with current dose of VPA.        DIAGNOSES     1. Schizoaffective disorder, bipolar type, multiple episodes, in acute episode  2. Cannabis use disorder, severe  3. Stimulant (meth) use disorder, unspecified       PLAN     Location: Unit 5 ([facility])  Legal Status: Orders Placed This Encounter      Voluntary    Safety Assessment:    Behavioral Orders   Procedures     Code 1 - Restrict to Unit     Routine Programming     As clinically indicated     Status 15     Every 15 minutes.      PTA psychotropic medications held:      - None, as not taking any     Previously on Zyprexa 10 mg BID, Trazodone 100 mg at bedtime, and Strattera 60 mg daily not being resumed given not taking and patient not interested. Also, simplifying regimen.     PTA psychotropic medications continued/changed:      - None, as not taking any     New psychotropic medications initiated:      - Haldol 5 mg BID (previously on up to 10 mg BID)  - Depakote 1,000 mg at bedtime -> 1,250 mg at bedtime as of 8/9  - Cogentin 0.5 mg BID  - Standard unit prn agents, including Haldol prn agitation    Today's Changes:    - Change Haldol to 10 mg at bedtime   - Start Cogentin 0.5 mg BID  - Move out of [facility]    Programming: Patient will be treated in a therapeutic milieu with appropriate individual and group therapies. Education will be provided on diagnoses, medications, and treatments.     Medical diagnoses:  Per medicine    #. Prediabetes  - Conservative management (diet, exercise)  - Haldol is low risk of metabolic syndrome  - Metformin 500 mg at bedtime started      Consult: Nutrition   Tests: CMP, VPA on 8/12     Anticipated LOS: > 7  days   Disposition: Home with outpatient services vs IRTS       ATTESTATION      Dr. Arsh Wagoner  Psychiatrist    Video Visit: Patient has given verbal consent for video visit?: Yes  Type of Service: video visit for mental health treatment  Reason for [place] Visit: [place]  Originating Site (patient location): [facility] ([place])  Distant Site (provider location): Remote Location  Mode of Communication: Video Conference via [employer]  Time of Service: Date: 08/10/2023 Start: 745 end: 800

## 2023-08-11 PROCEDURE — 250N000013 HC RX MED GY IP 250 OP 250 PS 637: Performed by: NURSE PRACTITIONER

## 2023-08-11 PROCEDURE — 99233 SBSQ HOSP IP/OBS HIGH 50: CPT | Mod: 95 | Performed by: STUDENT IN AN ORGANIZED HEALTH CARE EDUCATION/TRAINING PROGRAM

## 2023-08-11 PROCEDURE — 124N000001 HC R&B MH

## 2023-08-11 PROCEDURE — 250N000013 HC RX MED GY IP 250 OP 250 PS 637: Performed by: STUDENT IN AN ORGANIZED HEALTH CARE EDUCATION/TRAINING PROGRAM

## 2023-08-11 RX ORDER — BENZTROPINE MESYLATE 1 MG/1
1 TABLET ORAL 2 TIMES DAILY
Status: DISCONTINUED | OUTPATIENT
Start: 2023-08-11 | End: 2023-08-14

## 2023-08-11 RX ADMIN — NICOTINE POLACRILEX 2 MG: 2 GUM, CHEWING ORAL at 16:43

## 2023-08-11 RX ADMIN — HALOPERIDOL 5 MG: 5 TABLET ORAL at 07:05

## 2023-08-11 RX ADMIN — DIPHENHYDRAMINE HYDROCHLORIDE 50 MG: 50 CAPSULE ORAL at 07:05

## 2023-08-11 RX ADMIN — BENZTROPINE MESYLATE 1 MG: 1 TABLET ORAL at 06:38

## 2023-08-11 RX ADMIN — DIVALPROEX SODIUM 1250 MG: 250 TABLET, FILM COATED, EXTENDED RELEASE ORAL at 20:02

## 2023-08-11 RX ADMIN — NICOTINE POLACRILEX 2 MG: 2 LOZENGE ORAL at 06:02

## 2023-08-11 RX ADMIN — NICOTINE POLACRILEX 2 MG: 2 GUM, CHEWING ORAL at 14:18

## 2023-08-11 RX ADMIN — BENZTROPINE MESYLATE 1 MG: 1 TABLET ORAL at 17:39

## 2023-08-11 RX ADMIN — LORAZEPAM 2 MG: 1 TABLET ORAL at 07:04

## 2023-08-11 RX ADMIN — BENZTROPINE MESYLATE 1 MG: 1 TABLET ORAL at 20:02

## 2023-08-11 RX ADMIN — METFORMIN HYDROCHLORIDE 500 MG: 500 TABLET, FILM COATED ORAL at 16:40

## 2023-08-11 RX ADMIN — NICOTINE POLACRILEX 2 MG: 2 LOZENGE ORAL at 07:51

## 2023-08-11 RX ADMIN — HALOPERIDOL 12.5 MG: 2 TABLET ORAL at 20:02

## 2023-08-11 RX ADMIN — NICOTINE POLACRILEX 2 MG: 2 GUM, CHEWING ORAL at 20:03

## 2023-08-11 RX ADMIN — NICOTINE POLACRILEX 2 MG: 2 GUM, CHEWING ORAL at 17:58

## 2023-08-11 ASSESSMENT — ACTIVITIES OF DAILY LIVING (ADL)
ADLS_ACUITY_SCORE: 53
ADLS_ACUITY_SCORE: 53
ORAL_HYGIENE: INDEPENDENT
ADLS_ACUITY_SCORE: 53
ADLS_ACUITY_SCORE: 53
DRESS: INDEPENDENT
ADLS_ACUITY_SCORE: 53
HYGIENE/GROOMING: INDEPENDENT
ADLS_ACUITY_SCORE: 53

## 2023-08-11 NOTE — PROGRESS NOTES
Spoke with [facility] about psychiatry and therapy and they currently have no providers that take medicare insurance. Psychiatry his first two appointments have to be in person. Will discuss with Pt and call back HC on Monday.

## 2023-08-11 NOTE — PLAN OF CARE
"Face to face report received from Aure HINKLE. Pt. Observed.    Problem: Adult Behavioral Health Plan of Care  Goal: Patient-Specific Goal (Individualization)  Description: Pt will follow recommendations of treatment team.  Pt will be compliant with medications.  Pt will attend 50 % of groups.  Pt will sleep 6-8 hours each night.       Outcome: Progressing   Pt has been in bed with eyes closed and regular respirations x 7 hours this noc shift. Pt. Out to red @ approximately 0600 pacing halls with music headphone. 15 minute and PRN checks all night. Will continue to monitor.        Pt. Requesting to wear his blue ring. Pt. Updated he has no ring listed in his belongings. Pt. Reports \"I do. It was put in my back pocket when I was in the ER.\"  This writer checked pt. Belongings, blue ring in pt. Pants pocket. Ring added to pt. Belongings and placed back in his belongings bin. Pt. Requesting to wear ring. Treatment team updated via sticky note.     0638 Pt. Requesting/administered PRN Cogentin 1 mg po for c/o increasing anxiety, restlessness, all over body pain.    0700 Pt. To nurses station gabbing door as staff walked in. Pt. Asked by staff to not push door open. Pt. Raising voice \"I didn't touch the fucking door. What the fuck do you think. I didn't touch the fucking door.\" Pt. Asked to lower voice and keep it PG as other patients are present. Pt. Raising voice, staring intensely, gripping fists \"I'll keep it PG till I kill you.\" Pt. Then raced to his room. 0705 Pt. Administered Benadryl 50 mg po, Ativan 2 mg po, and Haldol 5 mg po for increasing agitation and threats of violence. Pt. Swallowed medications then raised voice. \"I snort Benadryl. Don't tell me what to do or I'll freak out on you.\" Pt. Threw cup of water across room.   Pt. Following this writer to Nurses station raising voice \"Get me my karina gum. I fucking have been asking for it. Fuck you. Get me my gum or I'll show you.\" Pt. Continues to swear and " "threaten this writer. Pt. Escorted by staff x 3 to MHICU. Pt. Willing to Transfer to MH-ICU. Pt. Handing over music head phones, and shoes. Pt. Continues to swear \"Fuck this. I don't want your fucking coffee. Get me some orange juice.\" Staff brought pt. [place] juice pt. Denied he asked for it, requesting coffee. Pt. Pacing in MH-ICU.       Face to face end of shift report communicated to oncoming RN.     Vashti Moreno RN  8/11/2023  "

## 2023-08-11 NOTE — PROGRESS NOTES
"[facility] PSYCHIATRY  PROGRESS NOTE     SUBJECTIVE     Prior to interviewing the patient, I met with nursing and reviewed patient's clinical condition. We discussed clinical care both before and after the interview. I have reviewed the patient's clinical course by review of records including previous notes, labs, and vital signs.     Per nursing, the patient had the following behavioral events over the last 24-hours: Weaned out of ICU but had to return due to threatening staff, being agitated. Received Haldol/Ativan/Benadryl this morning due to his agitation. Also, received two does of prn Cogentin in last 24 hours.    On psychiatric interview, the patient was found in the ICU. He had the blanket over his head. No concerns elicited, including with increasing Haldol and Cogentin.    He has no concerns today and would prefer to rest. Discussed appropriate behavior in the unit. Patient is calmer.     MEDICATIONS   Scheduled Meds:    benztropine  0.5 mg Oral BID     divalproex sodium extended-release  1,250 mg Oral At Bedtime     haloperidol  10 mg Oral At Bedtime     metFORMIN  500 mg Oral Daily with supper     PRN Meds:.acetaminophen, alum & mag hydroxide-simethicone, benztropine, LORazepam **AND** diphenhydrAMINE **AND** haloperidol, haloperidol lactate **AND** diphenhydrAMINE **AND** LORazepam, haloperidol, hydrOXYzine, melatonin, nicotine     ALLERGIES   No Known Allergies     MENTAL STATUS EXAM   Vitals: /62 (BP Location: Right arm)   Pulse 79   Temp 96.9  F (36.1  C) (Tympanic)   Resp 14   Wt 59.1 kg (130 lb 6.4 oz)   SpO2 99%   BMI 19.83 kg/m      Appearance: Alert, oriented, dressed in hospital scrubs, thin  Attitude: Guareded  Eye Contact: Limited  Mood: \"Blah\"  Affect: Blunted, reduced range   Speech: Normal rate and rhythm   Psychomotor Behavior: No TD or rigidity. Some akathisia present.  Thought Process: Illogical at times, some disorganization   Associations: No loose associations "   Thought Content: No SI or HI. No SIB. Denies AVH. Paranoia and other delusional thought (possibly mind-reading) present, although less  Insight: Poor  Judgment: Poor  Oriented to: Person, place, and time  Attention Span and Concentration: Intact  Recent and Remote Memory: Intact  Language: English with appropriate syntax and vocabulary  Fund of Knowledge: Low to average   Muscle Strength and Tone: Some rigidity  Gait and Station: Grossly normal        LABS   No results found for this or any previous visit (from the past 24 hour(s)).      IMPRESSION     This is a 27 year old male with a PMH of SZAD, bipolar type and polysubstance abuse (THC, meth) who presents in a psychotic episode occurring in the context of medication non-adherence and substance use, namely THC. The patient has a history of multiple hospitalization in the past, most recently in January 2023 for a similar presentation. He also has a history of commitment. He has struggled with social and occupational functioning, currently being unemployed and living with his father. He would benefit from hospitalization for management of this acute psychiatric crisis.     In terms of treatment, will start him back on Haldol with plan to use Haldol Dec and Depakote given him doing well on these medications in the past and this being his preference.    Today: Patient is showing some signs of improvement, being less irritable and guarded, although had agitation today being moved back to the [facility]. Increasing Haldol. In addition, increasing Cogentin to help with EPSE. Will require further time to stabilize.       DIAGNOSES     1. Schizoaffective disorder, bipolar type, multiple episodes, in acute episode  2. Cannabis use disorder, severe  3. Stimulant (meth) use disorder, unspecified       PLAN     Location: Unit 5 ([facility])  Legal Status: Orders Placed This Encounter      Voluntary    Safety Assessment:    Behavioral Orders   Procedures     Code 1 - Restrict to Unit      Routine Programming     As clinically indicated     Status 15     Every 15 minutes.      PTA psychotropic medications held:      - None, as not taking any     Previously on Zyprexa 10 mg BID, Trazodone 100 mg at bedtime, and Strattera 60 mg daily not being resumed given not taking and patient not interested. Also, simplifying regimen.     PTA psychotropic medications continued/changed:      - None, as not taking any     New psychotropic medications initiated:      - Haldol 5 mg BID (previously on up to 10 mg BID) -> 10 mg at bedtime on 8/10  - Depakote 1,000 mg at bedtime -> 1,250 mg at bedtime as of 8/9  - Cogentin 0.5 mg BID  - Standard unit prn agents, including Haldol prn agitation    Today's Changes:    - Continue in MHICU given agitation this morning  - Increase evening Haldol to 12.5 mg at bedtime   - Increase Cogentin to 1 mg BID    Programming: Patient will be treated in a therapeutic milieu with appropriate individual and group therapies. Education will be provided on diagnoses, medications, and treatments.     Medical diagnoses:  Per medicine    #. Prediabetes  - Conservative management (diet, exercise)  - Haldol is low risk of metabolic syndrome  - Metformin 500 mg at bedtime started      Consult: Nutrition   Tests: CMP, VPA on 8/12     Anticipated LOS: > 7 days   Disposition: Home with outpatient services vs IRTS       ATTESTATION      Dr. Arsh Wagoner  Psychiatrist    Video Visit: Patient has given verbal consent for video visit?: Yes  Type of Service: video visit for mental health treatment  Reason for Video Visit: [place]  Originating Site (patient location): [facility]  Distant Site (provider location): Remote Location  Mode of Communication: Video Conference via [employer]  Time of Service: Date: 08/11/2023 Start: 930 end: 94

## 2023-08-11 NOTE — PLAN OF CARE
"Face to face shift report received from ARIN Kingston.       Problem: Adult Behavioral Health Plan of Care  Goal: Patient-Specific Goal (Individualization)  Description: Pt will follow recommendations of treatment team.  Pt will be compliant with medications.  Pt will attend 50 % of groups.  Pt will sleep 6-8 hours each night.     8/11/23: Room in MHICU due to threats of violence towards unit staff. No wean at this time.   Outcome: Not Progressing   Pt extremely irritable and demanding at beginning of shift. Pacing in MHICU lounge with both hands on groin area under waistband of pants. When writer and another staff member entered MHICU pt placed self between staff and MHICU exit. Writer directed pt to enter his room to allow staff to exit, which he did immediately. Shortly after breakfast pt sleeping in bed. AM Cogentin 0.5mg held due to pt receiving PRN Cogentin 1mg at 0638.   Pt sleeping in bed until 1430. Requests nicotine gum and radio headset. Writer informed pt that this item is not allowed in MHICU. Pt questioned how long he will be in the MHICU, writer encouraged appropriate behavior and this will be reassessed in coming days. Pt stated \"I don't care about being back here anyway.\" Flat affect. Writer questioned pt about foot pain, pt walked away without responding to writer.     Problem: Thought Process Alteration  Goal: Optimal Thought Clarity  Description: Pt will have a logical and linear conversation.  Pt will verbalize 2-3 coping skills   Outcome: Not Progressing       Problem: Suicidal Behavior  Goal: Suicidal Behavior is Absent or Managed  Description: Patient will remain free of self harm while on the unit.  Patient will be able to list 2 coping skills by discharge.  Patient will inform staff of suicidal thoughts while on the unit.   Outcome: Progressing   Free from self harm or injury this shift.     Face to face end of shift report to be communicated to oncoming RN.       " normal...

## 2023-08-11 NOTE — PLAN OF CARE
"Face to face shift report received from Rowan VELA RN. Rounding completed, pt observed.    Problem: Adult Behavioral Health Plan of Care  Goal: Patient-Specific Goal (Individualization)  Description: Pt will follow recommendations of treatment team.  Pt will be compliant with medications.  Pt will attend 50 % of groups.  Pt will sleep 6-8 hours each night.     8/11/23: Room in MHICU due to threats of violence towards unit staff. No wean at this time.       Outcome: Progressing  Note: Shift Summary:  Patient remains in a room in the MHICU r/t unpredictable behavior.  Patient observed pacing in IICU lounge and will give staff the side eye as he walks by the windows.  Patient takes no responsibility in his being threatening.  States that \"I don't care about that.  You don't know what they said to me\".  Patient given opportunity to give his version of what happened but he stated \"I'll just stay in my room then if I can't come out to group\".  Nurse attempted to educate patient on it is his behavior that dictates him weaning and moving out of MHICU.  Patient just repeated \"I will just stay in my room then\".  Patient did take phone call from mother.  Gave back phone to staff when asked. Gait is balanced and steady.  No complaints of pain.   0018:  Care continued for next shift.  Patient observed lying in bed with eyes closed.  Respirations are visible and regular. Independent with position changes.    Problem: Thought Process Alteration  Goal: Optimal Thought Clarity  Description: Pt will have a logical and linear conversation.  Pt will verbalize 2-3 coping skills     Outcome: Progressing     Problem: Suicidal Behavior  Goal: Suicidal Behavior is Absent or Managed  Description: Patient will remain free of self harm while on the unit.  Patient will be able to list 2 coping skills by discharge.  Patient will inform staff of suicidal thoughts while on the unit.   Outcome: Progressing  Face to face report will be communicated " to oncoming ARIN.    Aure Hicks, ARIN  8/11/2023

## 2023-08-12 LAB
ALBUMIN SERPL BCG-MCNC: 4.3 G/DL (ref 3.5–5.2)
ALP SERPL-CCNC: 52 U/L (ref 40–129)
ALT SERPL W P-5'-P-CCNC: 5 U/L (ref 0–70)
ANION GAP SERPL CALCULATED.3IONS-SCNC: 11 MMOL/L (ref 7–15)
AST SERPL W P-5'-P-CCNC: 11 U/L (ref 0–45)
BILIRUB SERPL-MCNC: 0.3 MG/DL
BUN SERPL-MCNC: 16.2 MG/DL (ref 6–20)
CALCIUM SERPL-MCNC: 9.2 MG/DL (ref 8.6–10)
CHLORIDE SERPL-SCNC: 100 MMOL/L (ref 98–107)
CREAT SERPL-MCNC: 0.76 MG/DL (ref 0.67–1.17)
DEPRECATED HCO3 PLAS-SCNC: 25 MMOL/L (ref 22–29)
GFR SERPL CREATININE-BSD FRML MDRD: >90 ML/MIN/1.73M2
GLUCOSE SERPL-MCNC: 84 MG/DL (ref 70–99)
POTASSIUM SERPL-SCNC: 4.6 MMOL/L (ref 3.4–5.3)
PROT SERPL-MCNC: 6.6 G/DL (ref 6.4–8.3)
SODIUM SERPL-SCNC: 136 MMOL/L (ref 136–145)
VALPROATE SERPL-MCNC: 82.3 UG/ML

## 2023-08-12 PROCEDURE — 36415 COLL VENOUS BLD VENIPUNCTURE: CPT | Performed by: STUDENT IN AN ORGANIZED HEALTH CARE EDUCATION/TRAINING PROGRAM

## 2023-08-12 PROCEDURE — 99232 SBSQ HOSP IP/OBS MODERATE 35: CPT | Mod: 95 | Performed by: STUDENT IN AN ORGANIZED HEALTH CARE EDUCATION/TRAINING PROGRAM

## 2023-08-12 PROCEDURE — 250N000013 HC RX MED GY IP 250 OP 250 PS 637: Performed by: STUDENT IN AN ORGANIZED HEALTH CARE EDUCATION/TRAINING PROGRAM

## 2023-08-12 PROCEDURE — 124N000001 HC R&B MH

## 2023-08-12 PROCEDURE — 80053 COMPREHEN METABOLIC PANEL: CPT | Performed by: STUDENT IN AN ORGANIZED HEALTH CARE EDUCATION/TRAINING PROGRAM

## 2023-08-12 PROCEDURE — 80164 ASSAY DIPROPYLACETIC ACD TOT: CPT | Performed by: STUDENT IN AN ORGANIZED HEALTH CARE EDUCATION/TRAINING PROGRAM

## 2023-08-12 PROCEDURE — 250N000013 HC RX MED GY IP 250 OP 250 PS 637: Performed by: NURSE PRACTITIONER

## 2023-08-12 RX ADMIN — METFORMIN HYDROCHLORIDE 500 MG: 500 TABLET, FILM COATED ORAL at 17:15

## 2023-08-12 RX ADMIN — HALOPERIDOL 12.5 MG: 2 TABLET ORAL at 20:27

## 2023-08-12 RX ADMIN — NICOTINE POLACRILEX 2 MG: 2 GUM, CHEWING ORAL at 18:32

## 2023-08-12 RX ADMIN — NICOTINE POLACRILEX 2 MG: 2 GUM, CHEWING ORAL at 16:24

## 2023-08-12 RX ADMIN — NICOTINE POLACRILEX 2 MG: 2 GUM, CHEWING ORAL at 11:03

## 2023-08-12 RX ADMIN — DIVALPROEX SODIUM 1250 MG: 250 TABLET, FILM COATED, EXTENDED RELEASE ORAL at 20:27

## 2023-08-12 RX ADMIN — BENZTROPINE MESYLATE 1 MG: 1 TABLET ORAL at 18:24

## 2023-08-12 RX ADMIN — NICOTINE POLACRILEX 2 MG: 2 GUM, CHEWING ORAL at 06:03

## 2023-08-12 RX ADMIN — BENZTROPINE MESYLATE 1 MG: 1 TABLET ORAL at 08:13

## 2023-08-12 RX ADMIN — HALOPERIDOL 5 MG: 5 TABLET ORAL at 08:13

## 2023-08-12 RX ADMIN — BENZTROPINE MESYLATE 1 MG: 1 TABLET ORAL at 20:28

## 2023-08-12 ASSESSMENT — ACTIVITIES OF DAILY LIVING (ADL)
ADLS_ACUITY_SCORE: 53

## 2023-08-12 NOTE — PROGRESS NOTES
"[facility] PSYCHIATRY  PROGRESS NOTE     SUBJECTIVE     Prior to interviewing the patient, I met with nursing and reviewed patient's clinical condition. We discussed clinical care both before and after the interview. I have reviewed the patient's clinical course by review of records including previous notes, labs, and vital signs.     Per nursing, the patient had the following behavioral events over the last 24-hours: More irritable and demanding.Taking medications as prescribed.    On psychiatric interview, the patient was found in the ICU. The patient notes that he is \"chillin.\" He notes that he does not have a \"damn thing on his mind.\"    The patient notes that he feels a little better and does not feel better at the same time. He notes that he is no longer a \"nervous wreck.\" He notes that he is clear, but not clear. He notes that he is \"there, being clear minded and seeing clear minded things.\" The patient denies any AH. He notes that \"ourselves play tricks on us.\" He notes that in his belief system this happens.    The patient notes that his restlessness and irritability has improved with Cogentin. He notes that his \"belief system is hard to manage without the Cogentin.\"     The patient notes that he is doing well with a higher dose of Haldol. He denies any concerns with it and would like to keep at this dose.       MEDICATIONS   Scheduled Meds:    benztropine  1 mg Oral BID     divalproex sodium extended-release  1,250 mg Oral At Bedtime     haloperidol  12.5 mg Oral At Bedtime     metFORMIN  500 mg Oral Daily with supper     PRN Meds:.acetaminophen, alum & mag hydroxide-simethicone, benztropine, LORazepam **AND** diphenhydrAMINE **AND** haloperidol, haloperidol lactate **AND** diphenhydrAMINE **AND** LORazepam, haloperidol, hydrOXYzine, melatonin, nicotine     ALLERGIES   No Known Allergies     MENTAL STATUS EXAM   Vitals: /62 (BP Location: Right arm)   Pulse 79   Temp 96.9  F (36.1  C) " "(Tympanic)   Resp 14   Wt 59.1 kg (130 lb 6.4 oz)   SpO2 99%   BMI 19.83 kg/m      Appearance: Alert, oriented, dressed in hospital scrubs, thin  Attitude: Guareded  Eye Contact: Limited  Mood: \"Chillin\"  Affect: Blunted, reduced range   Speech: Normal rate and rhythm   Psychomotor Behavior: No TD or rigidity. Some akathisia present.  Thought Process: Illogical at times, some disorganization   Associations: No loose associations   Thought Content: No SI or HI. No SIB. Denies AVH. Paranoia and other delusional thought (possibly mind-reading) improving  Insight: Poor  Judgment: Poor  Oriented to: Person, place, and time  Attention Span and Concentration: Intact  Recent and Remote Memory: Intact  Language: English with appropriate syntax and vocabulary  Fund of Knowledge: Low to average   Muscle Strength and Tone: No apparent rigidity  Gait and Station: Grossly normal        LABS   No results found for this or any previous visit (from the past 24 hour(s)).      IMPRESSION     This is a 27 year old male with a PMH of SZAD, bipolar type and polysubstance abuse (THC, meth) who presents in a psychotic episode occurring in the context of medication non-adherence and substance use, namely THC. The patient has a history of multiple hospitalization in the past, most recently in January 2023 for a similar presentation. He also has a history of commitment. He has struggled with social and occupational functioning, currently being unemployed and living with his father. He would benefit from hospitalization for management of this acute psychiatric crisis.     In terms of treatment, will start him back on Haldol with plan to use Haldol Dec and Depakote given him doing well on these medications in the past and this being his preference.    Today: Patient is showing some signs of improvement, being less irritable and guarded, although had agitation two days ago being moved back to the [facility]. Increasing Haldol. In addition, " increasing Cogentin to help with EPSE. Will require further time to stabilize. Anticipate discharge back to father's house in a week.       DIAGNOSES     1. Schizoaffective disorder, bipolar type, multiple episodes, in acute episode  2. Cannabis use disorder, severe  3. Stimulant (meth) use disorder, unspecified       PLAN     Location: Unit 5 ([facility])  Legal Status: Orders Placed This Encounter      Voluntary    Safety Assessment:    Behavioral Orders   Procedures     Code 1 - Restrict to Unit     Routine Programming     As clinically indicated     Status 15     Every 15 minutes.      PTA psychotropic medications held:      - None, as not taking any     Previously on Zyprexa 10 mg BID, Trazodone 100 mg at bedtime, and Strattera 60 mg daily not being resumed given not taking and patient not interested. Also, simplifying regimen.     PTA psychotropic medications continued/changed:      - None, as not taking any     New psychotropic medications initiated:      - Haldol 5 mg BID (previously on up to 10 mg BID) -> 10 mg at bedtime on 8/10  - Depakote 1,000 mg at bedtime -> 1,250 mg at bedtime as of 8/9  - Cogentin 0.5 mg BID  - Standard unit prn agents, including Haldol prn agitation    [facility]'s Changes:    [facility]    Future considerations: Increasing Haldol to 15 mg then SANCHEZ if patient willing (not at this point)    Programming: Patient will be treated in a therapeutic milieu with appropriate individual and group therapies. Education will be provided on diagnoses, medications, and treatments.     Medical diagnoses:  Per medicine    #. Prediabetes  - Conservative management (diet, exercise)  - Haldol is low risk of metabolic syndrome  - Metformin 500 mg at bedtime started     Consult: Nutrition   Tests: CMP, VPA on 8/12     Anticipated LOS: > 7 days   Disposition: Home with outpatient services (medication management)       ATTESTATION      Dr. Arsh Wagoner  Psychiatrist    Video  Visit: Patient has given verbal consent for video visit?: Yes  Type of Service: video visit for mental health treatment  [place]: [place]  Originating Site (patient location): [facility]  Distant Site (provider location): Remote Location  Mode of Communication: Video Conference via [employer]  Time of Service: Date: 08/12/2023 Start: 700 end: 727

## 2023-08-12 NOTE — PLAN OF CARE
Face to face shift report received from ARIN Looney.       Problem: Adult Behavioral Health Plan of Care  Goal: Patient-Specific Goal (Individualization)  Description: Pt will follow recommendations of treatment team.  Pt will be compliant with medications.  Pt will attend 50 % of groups.  Pt will sleep 6-8 hours each night.     8/11/23: Room in MHICU due to threats of violence towards unit staff. No wean at this time.   Outcome: Not Progressing   Irritable and demanding. Medication compliant. Offered and accepted Haldol 5mg PO at 0813 for agitation. Pt appeared tense and observed to be pacing quickly in MHICU lounge. Appeared more calm for remainder of shift. Argumentative frequently throughout shift.     Problem: Thought Process Alteration  Goal: Optimal Thought Clarity  Description: Pt will have a logical and linear conversation.  Pt will verbalize 2-3 coping skills   Outcome: Not Progressing       Problem: Suicidal Behavior  Goal: Suicidal Behavior is Absent or Managed  Description: Patient will remain free of self harm while on the unit.  Patient will be able to list 2 coping skills by discharge.  Patient will inform staff of suicidal thoughts while on the unit.   Outcome: Progressing   Free from self harm or injury this shift.     Face to face end of shift report to be communicated to oncoming RN.

## 2023-08-13 PROCEDURE — 124N000001 HC R&B MH

## 2023-08-13 PROCEDURE — 250N000013 HC RX MED GY IP 250 OP 250 PS 637: Performed by: NURSE PRACTITIONER

## 2023-08-13 PROCEDURE — 250N000013 HC RX MED GY IP 250 OP 250 PS 637: Performed by: STUDENT IN AN ORGANIZED HEALTH CARE EDUCATION/TRAINING PROGRAM

## 2023-08-13 RX ADMIN — NICOTINE POLACRILEX 2 MG: 2 GUM, CHEWING ORAL at 06:02

## 2023-08-13 RX ADMIN — BENZTROPINE MESYLATE 1 MG: 1 TABLET ORAL at 18:34

## 2023-08-13 RX ADMIN — METFORMIN HYDROCHLORIDE 500 MG: 500 TABLET, FILM COATED ORAL at 16:49

## 2023-08-13 RX ADMIN — NICOTINE POLACRILEX 2 MG: 2 GUM, CHEWING ORAL at 17:15

## 2023-08-13 RX ADMIN — NICOTINE POLACRILEX 2 MG: 2 GUM, CHEWING ORAL at 19:55

## 2023-08-13 RX ADMIN — HALOPERIDOL 12.5 MG: 2 TABLET ORAL at 20:28

## 2023-08-13 RX ADMIN — DIVALPROEX SODIUM 1250 MG: 250 TABLET, FILM COATED, EXTENDED RELEASE ORAL at 20:28

## 2023-08-13 RX ADMIN — NICOTINE POLACRILEX 2 MG: 2 GUM, CHEWING ORAL at 11:13

## 2023-08-13 RX ADMIN — BENZTROPINE MESYLATE 1 MG: 1 TABLET ORAL at 20:27

## 2023-08-13 RX ADMIN — BENZTROPINE MESYLATE 1 MG: 1 TABLET ORAL at 08:10

## 2023-08-13 RX ADMIN — Medication 3 MG: at 22:49

## 2023-08-13 ASSESSMENT — ACTIVITIES OF DAILY LIVING (ADL)
ADLS_ACUITY_SCORE: 53

## 2023-08-13 NOTE — PLAN OF CARE
"Face to face shift report received from ARIN Sanabria.       Problem: Adult Behavioral Health Plan of Care  Goal: Patient-Specific Goal (Individualization)  Description: Pt will follow recommendations of treatment team.  Pt will be compliant with medications.  Pt will attend 50 % of groups.  Pt will sleep 6-8 hours each night.     8/13/23: Room in MHICU due to threats of violence towards unit staff. Pt able to wean as long as behaviors remain appropriate.   Outcome: Progressing  Room remains in MHICU due to threats of violence towards unit staff. Calm and cooperative. Medication compliant. Interacts with writer in an appropriate manner. Makes requests appropriately. Writer questioned pt if he would be able to maintain appropriate behavior if he were to wean to open unit, pt stated \"yeah I will behave. I sure as hell ain't touching that door again.\" Pt allowed to wean to open unit at 0900. Pt very appreciative to be able to spend time on open unit.   Pt on open unit off and on this shift. Observed to pace in hallways with radio headphones. Behavior appropriate. Pleasant during conversation. Slightly grandiose at times, stating \"I'm probably the fastest person around here, even faster than security.\" This statement was not in regards to elopement. Smiling and joking with staff. No reports of pain.     Problem: Thought Process Alteration  Goal: Optimal Thought Clarity  Description: Pt will have a logical and linear conversation.  Pt will verbalize 2-3 coping skills   Outcome: Progressing       Problem: Suicidal Behavior  Goal: Suicidal Behavior is Absent or Managed  Description: Patient will remain free of self harm while on the unit.  Patient will be able to list 2 coping skills by discharge.  Patient will inform staff of suicidal thoughts while on the unit.   Outcome: Progressing   Free from self harm or injury this shift.     Face to face end of shift report to be communicated to oncoming RN.       "

## 2023-08-13 NOTE — PLAN OF CARE
Problem: Adult Behavioral Health Plan of Care  Goal: Patient-Specific Goal (Individualization)  Description: Pt will follow recommendations of treatment team.  Pt will be compliant with medications.  Pt will attend 50 % of groups.  Pt will sleep 6-8 hours each night.     8/11/23: Room in MHICU due to threats of violence towards unit staff. No wean at this time.       Outcome: Progressing     Patient in bed asleep until 0300 then awake and in the MHICU lounge pacing.  He requested karina gum at 0600.  Has been smiling and polite with staff.  No threats of violence this shift. Face to face end of shift report communicated to day shift RN.     Elly Chen RN  8/13/2023  6:18 AM

## 2023-08-13 NOTE — PLAN OF CARE
"Face to face shift report received from Rowan HINKLE. Rounding completed, pt observed laying in bed at the start of the shift.    Patient weaning to the open unit throughout the evening. Remains appropriate with staff and other peers. No groups this shift. Walking the halls listening to headphones. Alert and making needs known. Pleasant during conversation with this writer. Compliant with scheduled medication and nursing assessment. Denies anxiety, depression, hallucinations, and SI/HI stating \"I don't believe in hallucinations or suicide.\" Denies pain. Requested and received Cogentin 1 mg at 1836 with adequate relief. Ate 100% of dinner and evening snack. Took a shower before bed. Requested and received melatonin 3 mg at 2249.     Continued care for the next shift.    Patient in bed resting with eyes closed and notable respirations. No observed or reported falls or self harm this shift. Up for a short time at 0400 walking around in the MHICU and returned to bed. Patient approximately slept 5 hours.     Problem: Adult Behavioral Health Plan of Care  Goal: Patient-Specific Goal (Individualization)  Description: Pt will follow recommendations of treatment team.  Pt will be compliant with medications.  Pt will attend 50 % of groups.  Pt will sleep 6-8 hours each night.     8/13/23: Room in MHICU due to threats of violence towards unit staff. Pt able to wean as long as behaviors remain appropriate.       Outcome: Progressing     Problem: Thought Process Alteration  Goal: Optimal Thought Clarity  Description: Pt will have a logical and linear conversation.  Pt will verbalize 2-3 coping skills     Outcome: Progressing     Problem: Suicidal Behavior  Goal: Suicidal Behavior is Absent or Managed  Description: Patient will remain free of self harm while on the unit.  Patient will be able to list 2 coping skills by discharge.  Patient will inform staff of suicidal thoughts while on the unit.   Outcome: Progressing    Face to " face report will be communicated to oncoming RN.    Molly Parrish RN  8/13/2023

## 2023-08-14 PROCEDURE — 250N000013 HC RX MED GY IP 250 OP 250 PS 637: Performed by: NURSE PRACTITIONER

## 2023-08-14 PROCEDURE — 250N000013 HC RX MED GY IP 250 OP 250 PS 637

## 2023-08-14 PROCEDURE — 250N000013 HC RX MED GY IP 250 OP 250 PS 637: Performed by: STUDENT IN AN ORGANIZED HEALTH CARE EDUCATION/TRAINING PROGRAM

## 2023-08-14 PROCEDURE — 124N000001 HC R&B MH

## 2023-08-14 PROCEDURE — 99233 SBSQ HOSP IP/OBS HIGH 50: CPT

## 2023-08-14 RX ORDER — BENZTROPINE MESYLATE 1 MG/1
1 TABLET ORAL 3 TIMES DAILY
Status: DISCONTINUED | OUTPATIENT
Start: 2023-08-14 | End: 2023-08-18 | Stop reason: HOSPADM

## 2023-08-14 RX ADMIN — ACETAMINOPHEN 650 MG: 325 TABLET, FILM COATED ORAL at 10:34

## 2023-08-14 RX ADMIN — NICOTINE POLACRILEX 2 MG: 2 GUM, CHEWING ORAL at 18:37

## 2023-08-14 RX ADMIN — DIVALPROEX SODIUM 1250 MG: 250 TABLET, FILM COATED, EXTENDED RELEASE ORAL at 20:28

## 2023-08-14 RX ADMIN — BENZTROPINE MESYLATE 1 MG: 1 TABLET ORAL at 08:09

## 2023-08-14 RX ADMIN — BENZTROPINE MESYLATE 1 MG: 1 TABLET ORAL at 20:29

## 2023-08-14 RX ADMIN — HYDROXYZINE HYDROCHLORIDE 50 MG: 25 TABLET, FILM COATED ORAL at 16:44

## 2023-08-14 RX ADMIN — NICOTINE POLACRILEX 2 MG: 2 GUM, CHEWING ORAL at 06:55

## 2023-08-14 RX ADMIN — NICOTINE POLACRILEX 2 MG: 2 GUM, CHEWING ORAL at 20:09

## 2023-08-14 RX ADMIN — HALOPERIDOL 15 MG: 5 TABLET ORAL at 20:28

## 2023-08-14 RX ADMIN — METFORMIN HYDROCHLORIDE 500 MG: 500 TABLET, FILM COATED ORAL at 16:50

## 2023-08-14 ASSESSMENT — ACTIVITIES OF DAILY LIVING (ADL)
HYGIENE/GROOMING: INDEPENDENT
LAUNDRY: UNABLE TO COMPLETE
ADLS_ACUITY_SCORE: 53
ORAL_HYGIENE: INDEPENDENT
ADLS_ACUITY_SCORE: 53
DRESS: SCRUBS (BEHAVIORAL HEALTH);INDEPENDENT
ADLS_ACUITY_SCORE: 53

## 2023-08-14 NOTE — PROGRESS NOTES
"1:1 time with the patient.  No changes made to the discharge plan at this time.   See the AVS for follow up appointments and recommendations.        Resources are listed in AVS for Pt. Pt asked when he would be leaving, \"states I am tired of being here\"   "

## 2023-08-14 NOTE — PLAN OF CARE
Problem: Adult Behavioral Health Plan of Care  Goal: Patient-Specific Goal (Individualization)  Description: Pt will follow recommendations of treatment team.  Pt will be compliant with medications.  Pt will attend 50 % of groups.  Pt will sleep 6-8 hours each night.     8/13/23: Room in MHICU due to threats of violence towards unit staff. Pt able to wean as long as behaviors remain appropriate.     Outcome: Progressing     Pt in MHICU at the start of the shift. Pt asked to wean out to the open unit after breakfast. Pt weaned much of the shift. Pt appropriate and cooperative with staff. Pt attended some group but spent most of the time walking in the halls listening to music on the headphones. Pt requested tylenol 650mg for ankle pain rated 6/10. Pt denied symptoms of depression, SI, HI and hallucinations. Pt does say his anxiety it ok but does appear anxious at times. Pt often times paces fast and distressful as opposed to his calm pacing when he is listening to music.         Problem: Thought Process Alteration  Goal: Optimal Thought Clarity  Description: Pt will have a logical and linear conversation.  Pt will verbalize 2-3 coping skills     Outcome: Progressing     Problem: Suicidal Behavior  Goal: Suicidal Behavior is Absent or Managed  Description: Patient will remain free of self harm while on the unit.  Patient will be able to list 2 coping skills by discharge.  Patient will inform staff of suicidal thoughts while on the unit.   Outcome: Progressing   Goal Outcome Evaluation:         Face to face end of shift report communicated to evening shift RN. Reported that pt is a risk for suicide.     Windy Saha, RN  8/14/2023  7:50 AM

## 2023-08-14 NOTE — PROGRESS NOTES
CLINICAL NUTRITION SERVICES  -  REASSESSMENT NOTE    Joseph YUSEF Kat :     27 yom admitted for psychosis. Medical/psych hx includes tobacco abuse, prediabetes (A1C 5.9 on 8/5/23), amphetamine use disorder, depressive disorder, bipolar 1 disorder, schizoaffective disorder. No new weight to assess. Good appetite, adequate intake.    Diet Order: Regular, double portions  Intake: 16 meals with 100% intake    Height: Data Unavailable  Weight: 130 lbs 6.4 oz  Body mass index is 19.83 kg/m .  Weight Status:  Normal BMI  Weight History: 10% loss in 5 months, 13% loss in 7 months   Wt Readings from Last 10 Encounters:   08/05/23 59.1 kg (130 lb 6.4 oz)   06/07/23 61.2 kg (134 lb 14.7 oz)   04/15/23 72.6 kg (160 lb)* [facility]ainment   01/11/23 68.3 kg (150 lb 9.5 oz)* [facility] Containment   11/12/22 69 kg (152 lb 3.2 oz)* [facility] Containment   06/15/22 61.2 kg (135 lb)   08/21/21 58.8 kg (129 lb 11.2 oz)* [facility] Containment   08/11/21 61.2 kg (135 lb)   03/19/18 65.3 kg (144 lb)   02/01/18 61.7 kg (136 lb)   01/25/18 63.5 kg (140 lb)      Weight used to calculate needs: 59.1kg - current weight  Estimated Energy Needs: 4306-4444 kcals (30-35 Kcal/Kg)   Estimated Protein Needs: 60-70 grams protein (1-1.2 g pro/Kg)     Malnutrition Diagnosis: Severe malnutrition  In Context of:  Chronic illness or disease, Environmental or social circumstances     NUTRITION DIAGNOSIS:  Malnutrition related to inadequate energy/protein intake as evidenced by 13% weight loss in 7 months     NUTRITION RECOMMENDATIONS  - Encourage intake during meal times as needed  - Monitor weight     MONITORING AND EVALUATION:  RD will monitor intake, weight, labs

## 2023-08-14 NOTE — PROGRESS NOTES
"[facility] PSYCHIATRY  PROGRESS NOTE     SUBJECTIVE     Prior to interviewing the patient, I met with nursing and reviewed patient's clinical condition. We discussed clinical care both before and after the interview. I have reviewed the patient's clinical course by review of records including previous notes, labs, and vital signs.     Per nursing, the patient had the following behavioral events over the last 24-hours: More irritable, made threats of violence to staff 8/13. Working on weaning. Taking medications as prescribed.    [facility] psychiatric [facility] He is concerned how long he will be in the hospital as Friday is pay day. I did inform the team and SW are working on his disposition. He replied \"fuck social workers\". He informed me that he did not need a second opinion from me and that he has a psychiatrist he sees on the iPad. I explained my role in his care on the treatment team, which appeared he comprehended.    He tells me that he is \"alright\" otherwise. He denies SI, HI, HIB and hallucinations of any kind. He tells me that he does not believe in suicide and that it is for \"cowards\". When asked bout harmful thoughts toward others, he replied \"I wouldn't tell if I did\" but in the same sentence stated \"no, that's not how I am\", meaning he was not having harmful thoughts. He denied any akathisia, restless legs or any other abnormal movement. He reported that he knows his VPA level is 0.8 but stated \"I'd like to see that lower\". We talked about checking lab values while taking Depakote, including VPA level and LFT's. He was agreeable. We did discuss increasing his Haldol tonight. Joseph stated \"It won't work, but we can do it.\" We discussed B/R as well as SE including TD, drug-induced parkinsonism and possible akathisia. Joseph was agreeable to increasing his Haldol, stating \"If I have akathisia, I can work through it on my own. I've done it before\".         MEDICATIONS " "  Scheduled Meds:   benztropine  1 mg Oral BID    divalproex sodium extended-release  1,250 mg Oral At Bedtime    haloperidol  12.5 mg Oral At Bedtime    metFORMIN  500 mg Oral Daily with supper     PRN Meds:.acetaminophen, alum & mag hydroxide-simethicone, benztropine, LORazepam **AND** diphenhydrAMINE **AND** haloperidol, haloperidol lactate **AND** diphenhydrAMINE **AND** LORazepam, haloperidol, hydrOXYzine, melatonin, nicotine     ALLERGIES   No Known Allergies     MENTAL STATUS EXAM   Vitals: /80 (BP Location: Right arm)   Pulse 60   Temp 97.1  F (36.2  C) (Tympanic)   Resp 12   Wt 59.1 kg (130 lb 6.4 oz)   SpO2 100%   BMI 19.83 kg/m      Appearance: Alert, oriented, dressed in hospital scrubs, thin  Attitude: Guareded  Eye Contact: intense, unblinking  Mood: \"alright\"  Affect: Blunted, reduced range   Speech: Normal rate and rhythm   Psychomotor Behavior: No TD or rigidity. Some akathisia present, noted restless when standing.   Thought Process: Illogical at times, some disorganization   Associations: No loose associations   Thought Content: No SI or HI. No SIB. Denies AVH. Paranoia and other delusional thought (possibly mind-reading) improving  Insight: Poor  Judgment: Poor  Oriented to: Person, place, and time  Attention Span and Concentration: Intact  Recent and Remote Memory: Intact  Language: English with appropriate syntax and vocabulary  Fund of Knowledge: Low to average   Muscle Strength and Tone: No apparent rigidity  Gait and Station: Grossly normal        LABS   No results found for this or any previous visit (from the past 24 hour(s)).      IMPRESSION     This is a 27 year old male with a PMH of SZAD, bipolar type and polysubstance abuse (THC, meth) who presents in a psychotic episode occurring in the context of medication non-adherence and substance use, namely THC. The patient has a history of multiple hospitalization in the past, most recently in January 2023 for a similar " presentation. He also has a history of commitment. He has struggled with social and occupational functioning, currently being unemployed and living with his father. He would benefit from hospitalization for management of this acute psychiatric crisis.     In terms of treatment, will start him back on Haldol with plan to use Haldol Dec and Depakote given him doing well on these medications in the past and this being his preference.    Today: Patient is showing some signs of improvement, being less irritable and guarded, although had agitation two days ago being moved back to the [facility]. He has been weaning and been appropriate. Increasing Haldol to 15 mg and will monitor for SE. In addition, increasing Cogentin to help with EPSE. Will require further time to stabilize. Anticipate discharge back to father's house in a week.       DIAGNOSES     1. Schizoaffective disorder, bipolar type, multiple episodes, in acute episode  2. Cannabis use disorder, severe  3. Stimulant (meth) use disorder, unspecified       PLAN     Location: Unit 5 ([facility])  Legal Status: Orders Placed This Encounter      Voluntary    Safety Assessment:    Behavioral Orders   Procedures    Code 1 - Restrict to Unit    Routine Programming     As clinically indicated    Status 15     Every 15 minutes.      PTA psychotropic medications held:      - None, as not taking any     Previously on Zyprexa 10 mg BID, Trazodone 100 mg at bedtime, and Strattera 60 mg daily not being resumed given not taking and patient not interested. Also, simplifying regimen.     PTA psychotropic medications continued/changed:      - None, as not taking any     New psychotropic medications initiated:      - Haldol 5 mg BID (previously on up to 10 mg BID) -> 10 mg at bedtime on 8/10->12.5 mg 8/11,-> 15 mg at bedtime 8/14  - Depakote 1,000 mg at bedtime -> 1,250 mg at bedtime as of 8/9  - Cogentin 0.5 mg BID, changed to 1 mg BID previous provider, increased 8/14 to 1 mg TID for  EPSE  - Standard unit prn agents, including Haldol prn agitation    Today's Changes:    - Continue in MHICU given unpredictable behaviors. weaning  -increase Haldol to 15 mg at bedtime  -increase Cogentin to 1 mg TID    Future considerations: Haldol SANCHEZ if patient willing (not at this point)    Programming: Patient will be treated in a therapeutic milieu with appropriate individual and group therapies. Education will be provided on diagnoses, medications, and treatments.     Medical diagnoses:  Per medicine    #. Prediabetes  - Conservative management (diet, exercise)  - Haldol is low risk of metabolic syndrome  - Metformin 500 mg at bedtime started     Consult: Nutrition   Tests: none     Anticipated LOS: > 7 days   Disposition: Home with outpatient services (medication management)       TREATMENT TEAM CARE PLAN     Progress: Symptoms improving.    Continued Stay Criteria/Rationale: Ongoing treatment and safe discharge planning.    Medical/Physical: See above.    Precautions: See above.     Plan: Continue inpatient care with unit support and medication management.    Rationale for change in precautions or plan: NA due to no change.    Participants: GORGE Antonio CNP, Nursing, SW, OT.    The patient's care was discussed with the treatment team and chart notes were reviewed.        ATTESTATION      GORGE Antonio, CNP

## 2023-08-15 PROCEDURE — 250N000013 HC RX MED GY IP 250 OP 250 PS 637: Performed by: STUDENT IN AN ORGANIZED HEALTH CARE EDUCATION/TRAINING PROGRAM

## 2023-08-15 PROCEDURE — 250N000013 HC RX MED GY IP 250 OP 250 PS 637

## 2023-08-15 PROCEDURE — 250N000013 HC RX MED GY IP 250 OP 250 PS 637: Performed by: NURSE PRACTITIONER

## 2023-08-15 PROCEDURE — 99232 SBSQ HOSP IP/OBS MODERATE 35: CPT

## 2023-08-15 PROCEDURE — 124N000001 HC R&B MH

## 2023-08-15 RX ADMIN — NICOTINE POLACRILEX 2 MG: 2 GUM, CHEWING ORAL at 16:07

## 2023-08-15 RX ADMIN — DIVALPROEX SODIUM 1250 MG: 250 TABLET, FILM COATED, EXTENDED RELEASE ORAL at 20:49

## 2023-08-15 RX ADMIN — NICOTINE POLACRILEX 2 MG: 2 GUM, CHEWING ORAL at 20:50

## 2023-08-15 RX ADMIN — BENZTROPINE MESYLATE 1 MG: 1 TABLET ORAL at 14:41

## 2023-08-15 RX ADMIN — NICOTINE POLACRILEX 2 MG: 2 GUM, CHEWING ORAL at 14:58

## 2023-08-15 RX ADMIN — NICOTINE POLACRILEX 2 MG: 2 GUM, CHEWING ORAL at 21:52

## 2023-08-15 RX ADMIN — METFORMIN HYDROCHLORIDE 500 MG: 500 TABLET, FILM COATED ORAL at 16:52

## 2023-08-15 RX ADMIN — NICOTINE POLACRILEX 2 MG: 2 GUM, CHEWING ORAL at 18:50

## 2023-08-15 RX ADMIN — NICOTINE POLACRILEX 2 MG: 2 GUM, CHEWING ORAL at 17:20

## 2023-08-15 RX ADMIN — BENZTROPINE MESYLATE 1 MG: 1 TABLET ORAL at 11:14

## 2023-08-15 RX ADMIN — BENZTROPINE MESYLATE 1 MG: 1 TABLET ORAL at 08:03

## 2023-08-15 RX ADMIN — Medication 3 MG: at 21:39

## 2023-08-15 RX ADMIN — NICOTINE POLACRILEX 2 MG: 2 GUM, CHEWING ORAL at 08:28

## 2023-08-15 RX ADMIN — BENZTROPINE MESYLATE 1 MG: 1 TABLET ORAL at 21:07

## 2023-08-15 RX ADMIN — HALOPERIDOL 15 MG: 5 TABLET ORAL at 20:49

## 2023-08-15 RX ADMIN — BENZTROPINE MESYLATE 1 MG: 1 TABLET ORAL at 18:56

## 2023-08-15 RX ADMIN — NICOTINE POLACRILEX 2 MG: 2 GUM, CHEWING ORAL at 06:11

## 2023-08-15 RX ADMIN — NICOTINE POLACRILEX 2 MG: 2 GUM, CHEWING ORAL at 13:11

## 2023-08-15 ASSESSMENT — ACTIVITIES OF DAILY LIVING (ADL)
ADLS_ACUITY_SCORE: 53
HYGIENE/GROOMING: INDEPENDENT
LAUNDRY: UNABLE TO COMPLETE
ADLS_ACUITY_SCORE: 53
ADLS_ACUITY_SCORE: 63
ADLS_ACUITY_SCORE: 63
ADLS_ACUITY_SCORE: 53
HYGIENE/GROOMING: PROMPTS
DRESS: SCRUBS (BEHAVIORAL HEALTH);INDEPENDENT
ADLS_ACUITY_SCORE: 53
ORAL_HYGIENE: INDEPENDENT

## 2023-08-15 NOTE — PLAN OF CARE
PT pacing with headphones for a majority of shift, he would excuse himself at times to go back to his room.   He requested something besides cogentin for anxiety at 1644-he was agreeable to Atarax 50mg.   He wanted to wear his hat this shift, but handled it well when he was told no one was allowed hats.   He took the increased dose of halidol this shift and scheduled cogentin, reporting the cogentin dose help him.   He denies SI/HI and hallucinations.     Face to face end of shift report communicated to oncoming RN    Bushra Rust RN  8/14/2023  10:56 PM              Problem: Adult Behavioral Health Plan of Care  Goal: Patient-Specific Goal (Individualization)  Description: Pt will follow recommendations of treatment team.  Pt will be compliant with medications.  Pt will attend 50 % of groups.  Pt will sleep 6-8 hours each night.     8/13/23: Room in MHICU due to threats of violence towards unit staff. Pt able to wean as long as behaviors remain appropriate.       Outcome: Progressing     Problem: Thought Process Alteration  Goal: Optimal Thought Clarity  Description: Pt will have a logical and linear conversation.  Pt will verbalize 2-3 coping skills     Outcome: Progressing     Problem: Suicidal Behavior  Goal: Suicidal Behavior is Absent or Managed  Description: Patient will remain free of self harm while on the unit.  Patient will be able to list 2 coping skills by discharge.  Patient will inform staff of suicidal thoughts while on the unit.   Outcome: Progressing   Goal Outcome Evaluation:

## 2023-08-15 NOTE — PLAN OF CARE
Problem: Adult Behavioral Health Plan of Care  Goal: Patient-Specific Goal (Individualization)  Description: Pt will follow recommendations of treatment team.  Pt will be compliant with medications.  Pt will attend 50 % of groups.  Pt will sleep 6-8 hours each night.     Outcome: Progressing     Pt in MHICU at the start of the shift. Pt asked to wean to the open unit as soon as nursing came to MHICU. Pt weaned most of the day, pt listened to headphones ansd walked most of the shift. Pt attended group in the morning. Pt denies pain, depression, SI, HI, depression and hallucinations. Pt does endorse anxiety. Pt asked about negative thoughts, pt states that he tries to be positive but does admit to negative thoughts at times.   Pt requested cogentin this morning, states he has some stiffness. Pt received prn cogentin 1mg at           Problem: Thought Process Alteration  Goal: Optimal Thought Clarity  Description: Pt will have a logical and linear conversation.  Pt will verbalize 2-3 coping skills     Outcome: Progressing     Problem: Suicidal Behavior  Goal: Suicidal Behavior is Absent or Managed  Description: Patient will remain free of self harm while on the unit.  Patient will be able to list 2 coping skills by discharge.  Patient will inform staff of suicidal thoughts while on the unit.   Outcome: Progressing   Goal Outcome Evaluation:    Plan of Care Reviewed With: patient

## 2023-08-15 NOTE — PROGRESS NOTES
"[facility] PSYCHIATRY  PROGRESS NOTE     SUBJECTIVE     Prior to interviewing the patient, I met with nursing and reviewed patient's clinical condition. We discussed clinical care both before and after the interview. I have reviewed the patient's clinical course by review of records including previous notes, labs, and vital signs.     Per nursing, the patient had the following behavioral events over the last 24-hours: Paced majority of the time.Taking medications as prescribed. Slept 4 hours.     On psychiatric interview, the patient was found walking the hallway. I interviewed the patient with his nurse. He tells me he is doing well, but that he \"is not where I want to be\" in terms of his mood. He tells me that on a \"scale of 0-100, I'm a 0\". He denies anxiety, depression, SI, HI or SIB. He tells me he \"is just not where I want to be yet. But I'll get there.\" He tells me that he is eating and sleeping well. Nursing notes pt slept 4 hours last night.   He does endorse feeling \"tight\" in his body. He does appear to have a slight degree of akithisia, moving his feet front and back and rocking side to side. We talked about the BR of Haldol for his targeted symptoms as well as SE including EPSE and TD. The pt appeared to understand this information. I informed him that his scheduled Cogentin was increased to TID as well as having PRN.   Nursing offered to have pt move to the general unit as his behaviors have been appropriate. He agreed and verbalized his behavior, which was threatening violence to staff, and stated he had learned from this experience. Pt to move to the general unit once a room was available.   He is interested in therapy once he discharges. SW has sent a referral to [facility] for med management and therapy.      MEDICATIONS   Scheduled Meds:    benztropine  1 mg Oral TID     divalproex sodium extended-release  1,250 mg Oral At Bedtime     haloperidol  15 mg Oral At Bedtime     metFORMIN  500 " "mg Oral Daily with supper     PRN Meds:.acetaminophen, alum & mag hydroxide-simethicone, benztropine, LORazepam **AND** diphenhydrAMINE **AND** haloperidol, haloperidol lactate **AND** diphenhydrAMINE **AND** LORazepam, haloperidol, hydrOXYzine, melatonin, nicotine     ALLERGIES   No Known Allergies     MENTAL STATUS EXAM   Vitals: /73   Pulse 58   Temp 96.8  F (36  C) (Tympanic)   Resp 16   Wt 59.1 kg (130 lb 6.4 oz)   SpO2 99%   BMI 19.83 kg/m      Appearance: Alert, oriented, dressed in hospital scrubs, thin  Attitude: Guareded  Eye Contact: intense, unblinking  Mood: \"not where I want to be\"  Affect: Blunted, reduced range   Speech: Normal rate and rhythm   Psychomotor Behavior: No TD or rigidity. Some akathisia present, noted restless when standing.   Thought Process: Illogical at times, some disorganization   Associations: No loose associations   Thought Content: No SI or HI. No SIB. Denies AVH. Paranoia and other delusional thought (possibly mind-reading) improving  Insight: Poor  Judgment: Poor  Oriented to: Person, place, and time  Attention Span and Concentration: Intact  Recent and Remote Memory: Intact  Language: English with appropriate syntax and vocabulary  Fund of Knowledge: Low to average   Muscle Strength and Tone: No apparent rigidity  Gait and Station: Grossly normal        LABS   No results found for this or any previous visit (from the past 24 hour(s)).      IMPRESSION     This is a 27 year old male with a PMH of SZAD, bipolar type and polysubstance abuse (THC, meth) who presents in a psychotic episode occurring in the context of medication non-adherence and substance use, namely THC. The patient has a history of multiple hospitalization in the past, most recently in January 2023 for a similar presentation. He also has a history of commitment. He has struggled with social and occupational functioning, currently being unemployed and living with his father. He would benefit from " hospitalization for management of this acute psychiatric crisis.     In terms of treatment, will start him back on Haldol with plan to use Haldol Dec and Depakote given him doing well on these medications in the past and this being his preference.    Today: Joseph appears that his mood is improving. He has been appropriate weaning to the general unit and will move from the ICU today. He is having what appears to be akathisia. I encouraged use of his PRN Cogentin. He is interested in Haldol SANCHEZ for ease of administration and will begin this process. Will require further time to stabilize. Anticipate discharge back to father's house in a week.       DIAGNOSES     1. Schizoaffective disorder, bipolar type, multiple episodes, in acute episode  2. Cannabis use disorder, severe  3. Stimulant (meth) use disorder, unspecified       PLAN     Location: Unit 5 ([facility])  Legal Status: Orders Placed This Encounter      Voluntary    Safety Assessment:    Behavioral Orders   Procedures     Code 1 - Restrict to Unit     Routine Programming     As clinically indicated     Status 15     Every 15 minutes.      PTA psychotropic medications held:      - None, as not taking any     Previously on Zyprexa 10 mg BID, Trazodone 100 mg at bedtime, and Strattera 60 mg daily not being resumed given not taking and patient not interested. Also, simplifying regimen.     PTA psychotropic medications continued/changed:      - None, as not taking any     New psychotropic medications initiated:      - Haldol 5 mg BID (previously on up to 10 mg BID) -> 10 mg at bedtime on 8/10->12.5 mg 8/11,-> 15 mg at bedtime 8/14  - Depakote 1,000 mg at bedtime -> 1,250 mg at bedtime as of 8/9  - Cogentin 0.5 mg BID, changed to 1 mg BID previous provider, increased 8/14 to 1 mg TID for EPSE  - Standard unit prn agents, including Haldol prn agitation    Today's Changes:    -none    Future considerations: Haldol SANCHEZ, pt requesting.      Programming: Patient will be  treated in a therapeutic milieu with appropriate individual and group therapies. Education will be provided on diagnoses, medications, and treatments.     Medical diagnoses:  Per medicine    #. Prediabetes  - Conservative management (diet, exercise)  - Haldol is low risk of metabolic syndrome  - Metformin 500 mg at bedtime started     Consult: Nutrition   Tests: none     Anticipated LOS: > 7 days   Disposition: Home with outpatient services (medication management)       TREATMENT TEAM CARE PLAN     ATTESTATION      GORGE Antonio, CNP

## 2023-08-15 NOTE — PROGRESS NOTES
Referral sent to [facility] for psychiatry and therapy per patients request.

## 2023-08-15 NOTE — PLAN OF CARE
Problem: Adult Behavioral Health Plan of Care  Goal: Patient-Specific Goal (Individualization)  Description: Pt will follow recommendations of treatment team.  Pt will be compliant with medications.  Pt will attend 50 % of groups.  Pt will sleep 6-8 hours each night.     8/13/23: Room in MHICU due to threats of violence towards unit staff. Pt able to wean as long as behaviors remain appropriate.   Outcome: Progressing  Note: Face to face end of shift report received from Bushra SON RN. Rounding completed. Patient observed.     Nehal Bradford RN  8/15/2023  12:14 AM    Pt has been in bed with eyes closed and regular respirations 4 hours tonight. Pt intermittently paced in the MH-ICU lounge.       Problem: Suicidal Behavior  Goal: Suicidal Behavior is Absent or Managed  Description: Patient will remain free of self harm while on the unit.  Patient will be able to list 2 coping skills by discharge.  Patient will inform staff of suicidal thoughts while on the unit.   Outcome: Progressing  Note: Pt remained free from self harm.

## 2023-08-16 PROCEDURE — 250N000013 HC RX MED GY IP 250 OP 250 PS 637

## 2023-08-16 PROCEDURE — 250N000013 HC RX MED GY IP 250 OP 250 PS 637: Performed by: STUDENT IN AN ORGANIZED HEALTH CARE EDUCATION/TRAINING PROGRAM

## 2023-08-16 PROCEDURE — 250N000013 HC RX MED GY IP 250 OP 250 PS 637: Performed by: NURSE PRACTITIONER

## 2023-08-16 PROCEDURE — 124N000001 HC R&B MH

## 2023-08-16 PROCEDURE — 99232 SBSQ HOSP IP/OBS MODERATE 35: CPT

## 2023-08-16 RX ADMIN — NICOTINE POLACRILEX 2 MG: 2 GUM, CHEWING ORAL at 19:47

## 2023-08-16 RX ADMIN — METFORMIN HYDROCHLORIDE 500 MG: 500 TABLET, FILM COATED ORAL at 16:53

## 2023-08-16 RX ADMIN — NICOTINE POLACRILEX 2 MG: 2 GUM, CHEWING ORAL at 17:20

## 2023-08-16 RX ADMIN — NICOTINE POLACRILEX 2 MG: 2 GUM, CHEWING ORAL at 15:41

## 2023-08-16 RX ADMIN — BENZTROPINE MESYLATE 1 MG: 1 TABLET ORAL at 12:18

## 2023-08-16 RX ADMIN — HALOPERIDOL 15 MG: 5 TABLET ORAL at 20:01

## 2023-08-16 RX ADMIN — BENZTROPINE MESYLATE 1 MG: 1 TABLET ORAL at 20:01

## 2023-08-16 RX ADMIN — NICOTINE POLACRILEX 2 MG: 2 GUM, CHEWING ORAL at 21:05

## 2023-08-16 RX ADMIN — Medication 3 MG: at 22:10

## 2023-08-16 RX ADMIN — NICOTINE POLACRILEX 2 MG: 2 GUM, CHEWING ORAL at 08:15

## 2023-08-16 RX ADMIN — BENZTROPINE MESYLATE 1 MG: 1 TABLET ORAL at 07:35

## 2023-08-16 RX ADMIN — BENZTROPINE MESYLATE 1 MG: 1 TABLET ORAL at 14:39

## 2023-08-16 RX ADMIN — NICOTINE POLACRILEX 2 MG: 2 GUM, CHEWING ORAL at 12:19

## 2023-08-16 RX ADMIN — DIVALPROEX SODIUM 1250 MG: 250 TABLET, FILM COATED, EXTENDED RELEASE ORAL at 20:01

## 2023-08-16 RX ADMIN — BENZTROPINE MESYLATE 1 MG: 1 TABLET ORAL at 17:48

## 2023-08-16 RX ADMIN — ACETAMINOPHEN 650 MG: 325 TABLET, FILM COATED ORAL at 18:41

## 2023-08-16 RX ADMIN — NICOTINE POLACRILEX 2 MG: 2 GUM, CHEWING ORAL at 06:04

## 2023-08-16 RX ADMIN — NICOTINE POLACRILEX 2 MG: 2 GUM, CHEWING ORAL at 14:40

## 2023-08-16 ASSESSMENT — ACTIVITIES OF DAILY LIVING (ADL)
ADLS_ACUITY_SCORE: 63
ADLS_ACUITY_SCORE: 53
ADLS_ACUITY_SCORE: 63
ADLS_ACUITY_SCORE: 53
ADLS_ACUITY_SCORE: 63
HYGIENE/GROOMING: INDEPENDENT
LAUNDRY: UNABLE TO COMPLETE
DRESS: SCRUBS (BEHAVIORAL HEALTH);INDEPENDENT
ADLS_ACUITY_SCORE: 63
ADLS_ACUITY_SCORE: 63
ADLS_ACUITY_SCORE: 53
ADLS_ACUITY_SCORE: 63
ADLS_ACUITY_SCORE: 53
ORAL_HYGIENE: INDEPENDENT
DRESS: SCRUBS (BEHAVIORAL HEALTH)
HYGIENE/GROOMING: INDEPENDENT
ADLS_ACUITY_SCORE: 53
ADLS_ACUITY_SCORE: 53
ORAL_HYGIENE: INDEPENDENT

## 2023-08-16 NOTE — PLAN OF CARE
"Problem: Adult Behavioral Health Plan of Care  Goal: Patient-Specific Goal (Individualization)  Description: Pt will follow recommendations of treatment team.  Pt will be compliant with medications.  Pt will attend 50 % of groups.  Pt will sleep 6-8 hours each night.   Outcome: Not Progressing  Note: Pt had a blunted affect. He had an irritable mood and was dismissive. When writer asked him his name and date of birth prior to medication administration, he stated \"you should know that already\" and stared at this writer intensely. Pt was informed that two forms of identification are needed prior to medication administration for the purpose of patient safety. He then only provided writer with his birthday. He spent the majority of the shift pacing in the hallway listening to music via radio headphones. Throughout the shift, pt made illogical, bizarre statements. 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 No change in pain reported upon reassessment.     2210 - Pt received PRN Melatonin for sleep.     Face to face end of shift report communicated to oncoming RN.      Problem: Thought Process Alteration  Goal: Optimal Thought Clarity  Description: Pt will have a logical and linear conversation.  Pt will verbalize 2-3 coping skills   Outcome: Not Progressing  Note: Pt was dismissive.      Problem: Suicidal Behavior  Goal: Suicidal Behavior is Absent or Managed  Description: Patient will remain free of self harm while on the unit.  Patient will be able to list 2 coping skills by discharge.  Patient will inform staff of suicidal thoughts while on the unit.   Outcome: Progressing  Note: Pt denied suicidal ideation. He remained free from self harm.      Goal Outcome Evaluation:    Plan of Care Reviewed With: patient                   "

## 2023-08-16 NOTE — PLAN OF CARE
"Problem: Adult Behavioral Health Plan of Care  Goal: Patient-Specific Goal (Individualization)  Description: Pt will follow recommendations of treatment team.  Pt will be compliant with medications.  Pt will attend 50 % of groups.  Pt will sleep 6-8 hours each night.   Outcome: Not Progressing  Note: Pt had an irritable mood tonight and was dismissive. Pt was upset that he did not receive his nicotine gum as soon as he requested it. Writer was on the phone with provider at the time. When pt was approached with the gum after writer was off the phone, he stated \"fuck it. I'm good.\" A couple of minutes later, he stated \"I'd rather go back to the [facility] than be here.\" Pt spent the majority of the shift pacing in the hallway listening to music via radio headphones. He had a visit with his parents tonight. When the P asked how his visit was, he stated \"all fucked.\" He spent some time playing chess with the [facility]. His mood brightened up afterwards.     1856 - Pt requested and received 1 mg of PRN Cogentin.     2139 - Pt requested and received PRN Melatonin for sleep.    Face to face end of shift report communicated to oncoming RN.      Problem: Thought Process Alteration  Goal: Optimal Thought Clarity  Description: Pt will have a logical and linear conversation.  Pt will verbalize 2-3 coping skills   Outcome: Not Progressing     Problem: Suicidal Behavior  Goal: Suicidal Behavior is Absent or Managed  Description: Patient will remain free of self harm while on the unit.  Patient will be able to list 2 coping skills by discharge.  Patient will inform staff of suicidal thoughts while on the unit.   Outcome: Progressing  Note: Pt denied suicidal ideation. He remained free from self harm.      Goal Outcome Evaluation:    Plan of Care Reviewed With: patient                   "

## 2023-08-16 NOTE — PROGRESS NOTES
"[facility] PSYCHIATRY  PROGRESS NOTE     SUBJECTIVE     Prior to interviewing the patient, I met with nursing and reviewed patient's clinical condition. We discussed clinical care both before and after the interview. I have reviewed the patient's clinical course by review of records including previous notes, labs, and vital signs.     Per nursing, the patient had the following behavioral events over the last 24-hours: Paced majority of the time.Taking medications as prescribed. Slept 6 hours.     On psychiatric interview, the patient was found walking the hallway. We walked to his room to talk. He is still interested in starting Haldol Decanoate. We discussed BR and SE, such as EPSE and TD. He states today his muscle \"tightness\" is a little worse than yesterday. He was not interested in other options at this time for akathisia. He did inform me that he would not discharge to his parents or homelessness. He tells me he would stay here for 2 years, saving his social security income to buy a car to live in. He would have it delivered right to the hospital. I did educated on the typical course for stabilization, which then he became visibly upset. He wanted the Haldol decanoate, but then didn't want it but would take it anyway. He told me to figure out a better solution for his discharge. I mentioned the original options of his parents or homeless resources that he discussed. He then asked for an IRTS, but then did not want to go to an IRTS. He ended our conversation.   A short while late he stopped me in the duran and informed he was speaking to  about homeless resources.      MEDICATIONS   Scheduled Meds:    benztropine  1 mg Oral TID     divalproex sodium extended-release  1,250 mg Oral At Bedtime     haloperidol  15 mg Oral At Bedtime     metFORMIN  500 mg Oral Daily with supper     PRN Meds:.acetaminophen, alum & mag hydroxide-simethicone, benztropine, LORazepam **AND** diphenhydrAMINE **AND** haloperidol, " "haloperidol lactate **AND** diphenhydrAMINE **AND** LORazepam, haloperidol, hydrOXYzine, melatonin, nicotine     ALLERGIES   No Known Allergies     MENTAL STATUS EXAM   Vitals: /81   Pulse 54   Temp 97.3  F (36.3  C) (Tympanic)   Resp 16   Wt 59.1 kg (130 lb 6.4 oz)   SpO2 99%   BMI 19.83 kg/m      Appearance: Alert, oriented, dressed in hospital scrubs, thin  Attitude: Guarded,  Eye Contact: intense, unblinking  Mood: \"alright\"  Affect: Blunted, reduced range, irritable   Speech: Normal rate and rhythm   Psychomotor Behavior: No TD or rigidity. Some akathisia present, noted restless when standing. Paces.  Thought Process: Illogical at times, some disorganization   Associations: No loose associations   Thought Content: No SI or HI. No SIB. Denies AVH. Paranoia and other delusional thought (possibly mind-reading) improving  Insight: Poor  Judgment: Poor  Oriented to: Person, place, and time  Attention Span and Concentration: Intact  Recent and Remote Memory: Intact  Language: English with appropriate syntax and vocabulary  Fund of Knowledge: Low to average   Muscle Strength and Tone: No apparent rigidity  Gait and Station: Grossly normal        LABS   No results found for this or any previous visit (from the past 24 hour(s)).      IMPRESSION     This is a 27 year old male with a PMH of SZAD, bipolar type and polysubstance abuse (THC, meth) who presents in a psychotic episode occurring in the context of medication non-adherence and substance use, namely THC. The patient has a history of multiple hospitalization in the past, most recently in January 2023 for a similar presentation. He also has a history of commitment. He has struggled with social and occupational functioning, currently being unemployed and living with his father. He would benefit from hospitalization for management of this acute psychiatric crisis.     In terms of treatment, will start him back on Haldol with plan to use Haldol Dec and " Depakote given him doing well on these medications in the past and this being his preference.    Today: Joseph was irritable when he was informed hospitalizations, once stable, are typically not 2 years in duration. He was upset because he did not want to move in with a parent or homeless resources. He later changed his mind and spoke with SW about homeless resources. He has been appropriate weaning to the general unit and will move from the ICU today. He is having what appears to be akathisia, though visibly less leg movement. He does report muscle tightness. I encouraged use of his PRN Cogentin. He is interested in Haldol SANCHEZ for ease of administration and will begin this process. Will require further time to stabilize. Anticipate discharge back to father's house in a week vs homeless resources.        DIAGNOSES     1. Schizoaffective disorder, bipolar type, multiple episodes, in acute episode  2. Cannabis use disorder, severe  3. Stimulant (meth) use disorder, unspecified       PLAN     Location: Unit 5 ([facility])  Legal Status: Orders Placed This Encounter      Voluntary    Safety Assessment:    Behavioral Orders   Procedures     Code 1 - Restrict to Unit     Routine Programming     As clinically indicated     Status 15     Every 15 minutes.      PTA psychotropic medications held:      - None, as not taking any     Previously on Zyprexa 10 mg BID, Trazodone 100 mg at bedtime, and Strattera 60 mg daily not being resumed given not taking and patient not interested. Also, simplifying regimen.     PTA psychotropic medications continued/changed:      - None, as not taking any     New psychotropic medications initiated:      - Haldol 5 mg BID (previously on up to 10 mg BID) -> 10 mg at bedtime on 8/10->12.5 mg 8/11,-> 15 mg at bedtime 8/14  - Depakote 1,000 mg at bedtime -> 1,250 mg at bedtime as of 8/9  - Cogentin 0.5 mg BID, changed to 1 mg BID previous provider, increased 8/14 to 1 mg TID for EPSE  - Standard unit  prn agents, including Haldol prn agitation    Today's Changes:    -none    Future considerations: Haldol SANCHEZ, pt requesting.      Programming: Patient will be treated in a therapeutic milieu with appropriate individual and group therapies. Education will be provided on diagnoses, medications, and treatments.     Medical diagnoses:  Per medicine    #. Prediabetes  - Conservative management (diet, exercise)  - Haldol is low risk of metabolic syndrome  - Metformin 500 mg at bedtime started     Consult: Nutrition   Tests: none     Anticipated LOS: > 7 days   Disposition: Home with outpatient services (medication management)       TREATMENT TEAM CARE PLAN     ATTESTATION      Martin Wilkinson, APRN, CNP

## 2023-08-16 NOTE — PROGRESS NOTES
"1:1 time with the patient.  No changes made to the discharge plan at this time.   See the AVS for follow up appointments and recommendations.      Spoke with Pt and Pt stated \"I am not living in a homeless shelter, or with my mom and dad. 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  "

## 2023-08-16 NOTE — PLAN OF CARE
Problem: Thought Process Alteration  Goal: Optimal Thought Clarity  Description: Pt will have a logical and linear conversation.  Pt will verbalize 2-3 coping skills     Outcome: Progressing     Problem: Adult Behavioral Health Plan of Care  Goal: Patient-Specific Goal (Individualization)  Description: Pt will follow recommendations of treatment team.  Pt will be compliant with medications.  Pt will attend 50 % of groups.  Pt will sleep 6-8 hours each night.       Outcome: Progressing     Patient has been up pacing in hallway all shift.  He becomes easily irritable when talking about treatment plan as he does not want to discharge to his parents or a homeless shelter.  Has remained polite with this writer but is complaining to peers.  Took all medications as prescribed and took his PRN cogentin when he was feeling restless.  Did not attend groups.  No complaints of pain.  Vs WNL.  Face to face end of shift report communicated to evening shift RN.     Elly Chen RN  8/16/2023  1:09 PM

## 2023-08-16 NOTE — PLAN OF CARE
Problem: Adult Behavioral Health Plan of Care  Goal: Patient-Specific Goal (Individualization)  Description: Pt will follow recommendations of treatment team.  Pt will be compliant with medications.  Pt will attend 50 % of groups.  Pt will sleep 6-8 hours each night.       Note:  Rounding complete.  Pt observed sleeping with regular and unlabored respirations.      Pt has been in bed with eyes closed and regular respirations.  15 minute and PRN checks all night.  No complaints offered.  Will continue to monitor.     Pt slept 6 hours.     Face to face end of shift communicated to oncoming ARIN.     Cindy GRAY  August 16, 2023  6:27 AM     Goal Outcome Evaluation:

## 2023-08-17 VITALS
WEIGHT: 130.4 LBS | DIASTOLIC BLOOD PRESSURE: 78 MMHG | OXYGEN SATURATION: 98 % | RESPIRATION RATE: 16 BRPM | HEART RATE: 67 BPM | BODY MASS INDEX: 19.83 KG/M2 | TEMPERATURE: 97.1 F | SYSTOLIC BLOOD PRESSURE: 140 MMHG

## 2023-08-17 PROCEDURE — 99232 SBSQ HOSP IP/OBS MODERATE 35: CPT

## 2023-08-17 PROCEDURE — 124N000001 HC R&B MH

## 2023-08-17 PROCEDURE — 250N000013 HC RX MED GY IP 250 OP 250 PS 637

## 2023-08-17 PROCEDURE — 250N000013 HC RX MED GY IP 250 OP 250 PS 637: Performed by: NURSE PRACTITIONER

## 2023-08-17 PROCEDURE — 250N000013 HC RX MED GY IP 250 OP 250 PS 637: Performed by: STUDENT IN AN ORGANIZED HEALTH CARE EDUCATION/TRAINING PROGRAM

## 2023-08-17 RX ADMIN — NICOTINE POLACRILEX 2 MG: 2 GUM, CHEWING ORAL at 08:18

## 2023-08-17 RX ADMIN — BENZTROPINE MESYLATE 1 MG: 1 TABLET ORAL at 21:23

## 2023-08-17 RX ADMIN — NICOTINE POLACRILEX 2 MG: 2 GUM, CHEWING ORAL at 14:45

## 2023-08-17 RX ADMIN — BENZTROPINE MESYLATE 1 MG: 1 TABLET ORAL at 06:05

## 2023-08-17 RX ADMIN — DIVALPROEX SODIUM 1250 MG: 250 TABLET, FILM COATED, EXTENDED RELEASE ORAL at 21:22

## 2023-08-17 RX ADMIN — BENZTROPINE MESYLATE 1 MG: 1 TABLET ORAL at 12:49

## 2023-08-17 RX ADMIN — BENZTROPINE MESYLATE 1 MG: 1 TABLET ORAL at 08:30

## 2023-08-17 RX ADMIN — NICOTINE POLACRILEX 2 MG: 2 GUM, CHEWING ORAL at 17:05

## 2023-08-17 RX ADMIN — NICOTINE POLACRILEX 2 MG: 2 GUM, CHEWING ORAL at 18:22

## 2023-08-17 RX ADMIN — NICOTINE POLACRILEX 2 MG: 2 GUM, CHEWING ORAL at 06:05

## 2023-08-17 RX ADMIN — NICOTINE POLACRILEX 2 MG: 2 GUM, CHEWING ORAL at 20:14

## 2023-08-17 RX ADMIN — ACETAMINOPHEN 650 MG: 325 TABLET, FILM COATED ORAL at 08:30

## 2023-08-17 RX ADMIN — BENZTROPINE MESYLATE 1 MG: 1 TABLET ORAL at 14:44

## 2023-08-17 RX ADMIN — HALOPERIDOL 15 MG: 5 TABLET ORAL at 21:23

## 2023-08-17 RX ADMIN — Medication 3 MG: at 21:23

## 2023-08-17 RX ADMIN — NICOTINE POLACRILEX 2 MG: 2 GUM, CHEWING ORAL at 22:01

## 2023-08-17 RX ADMIN — ACETAMINOPHEN 650 MG: 325 TABLET, FILM COATED ORAL at 16:21

## 2023-08-17 RX ADMIN — ACETAMINOPHEN 650 MG: 325 TABLET, FILM COATED ORAL at 22:42

## 2023-08-17 RX ADMIN — NICOTINE POLACRILEX 2 MG: 2 GUM, CHEWING ORAL at 10:43

## 2023-08-17 ASSESSMENT — ACTIVITIES OF DAILY LIVING (ADL)
ADLS_ACUITY_SCORE: 53
ADLS_ACUITY_SCORE: 53
DRESS: SCRUBS (BEHAVIORAL HEALTH);INDEPENDENT
ADLS_ACUITY_SCORE: 53
LAUNDRY: UNABLE TO COMPLETE
ADLS_ACUITY_SCORE: 53
DRESS: SCRUBS (BEHAVIORAL HEALTH);INDEPENDENT
HYGIENE/GROOMING: INDEPENDENT
ADLS_ACUITY_SCORE: 53
ORAL_HYGIENE: INDEPENDENT
ADLS_ACUITY_SCORE: 53
HYGIENE/GROOMING: INDEPENDENT
ADLS_ACUITY_SCORE: 53
ORAL_HYGIENE: INDEPENDENT
ADLS_ACUITY_SCORE: 53
ADLS_ACUITY_SCORE: 53

## 2023-08-17 NOTE — PLAN OF CARE
"  Problem: Adult Behavioral Health Plan of Care  Goal: Patient-Specific Goal (Individualization)  Description: Pt will follow recommendations of treatment team.  Pt will be compliant with medications.  Pt will attend 50 % of groups.  Pt will sleep 6-8 hours each night.       Outcome: Progressing     Problem: Thought Process Alteration  Goal: Optimal Thought Clarity  Description: Pt will have a logical and linear conversation.  Pt will verbalize 2-3 coping skills     Outcome: Progressing   Goal Outcome Evaluation:     Plan of Care Reviewed With: patient       Pt in the halls walking in the hallway listening to music on the headphones at the start of the shift. Pt agreed to walk with nursing staff, pt answered questions sarcastically but smiled after saying it Pt joked with nursing staff, reported that he was in some pain, states he has pain all the time in his joints. Pt agreed to take tylenol 650mg after breakfast  at 0830. Pt reported pain at 7/10. Pt has high anxiety that he denied often but states that he is always anxious. Pt denies SI, HI and hallucinations. Pt states that taking the cogentin prn early in the morning is helpful for the \"uncomfortable in his skin\" feel.             "

## 2023-08-17 NOTE — PLAN OF CARE
"Problem: Adult Behavioral Health Plan of Care  Goal: Patient-Specific Goal (Individualization)  Description: Pt will follow recommendations of treatment team.  Pt will be compliant with medications.  Pt will attend 50 % of groups.  Pt will sleep 6-8 hours each night.   Outcome: Progressing  Note: 1621 - Pt requested and received 650 mg of PRN Tylenol for c/o \"3/10\" bilateral foot pain. Upon reassessment, pt rated his pain \"2/10\". Pt refused his scheduled metformin. He was agitated and had a labile mood. He stated \"I'm not taking any meds tonight. I'm good. I'm leandra and noemí.\" When pt was asked if he was having any SI or HI, he stated \"be curious. I can cut out my tongue right now. It would be good for me.\" He then began laughing with an intense look in his eyes. He informed writer that he would [facility] be in the \"[facility]\" and that we need to release him to the homeless shelter. Pt had flight of ideas and disorganized thinking and speech.     2011 - Pt refused his bedtime medications.    2123 - Pt came up to the nurses station and stated \"just give me the damn meds. I want a room back there. (Referring to -ICU). Once pt was told that he can't take his shoes back there, he became agitated and stated \"take everything. Take my eyes, take my tongue, I don't give a fucking damn.\" Pt was walked back to the -ICU by this writer and UA.     2242 - Pt requested PRN Tylenol. When pt was asked where he was having pain, he stated \"none of your damn fucking business.\" As writer was walking out of the -ICU, he yelled \"get a real degree.\"     Face to face end of shift report will be communicated to oncoming RN.       Goal Outcome Evaluation:    Plan of Care Reviewed With: patient      "

## 2023-08-17 NOTE — PROGRESS NOTES
"[facility] PSYCHIATRY  PROGRESS NOTE     SUBJECTIVE     Prior to interviewing the patient, I met with nursing and reviewed patient's clinical condition. We discussed clinical care both before and after the interview. I have reviewed the patient's clinical course by review of records including previous notes, labs, and vital signs.     Per nursing, the patient had the following behavioral events over the last 24-hours: Paced majority of the time.Taking medications as prescribed. Slept 6 hours.     On psychiatric interview, the patient was found walking the hallway. We walked to his room to talk. He is still interested in starting Haldol Decanoate. We discussed BR and SE, such as EPSE and TD. He states today he has muscle \"tightness\" in BLE, but no worse than usual. We did discuss the timing of starting Haldol decanoate. He does share some anxiety about starting the IM's because of the follow up appointments. [facility]. He would have transportation through [facility], but again would need a phone to utilize this service. We talked about having his discharge plan optimized and continuing on the oral Haldol and could set it up with his medication management team. He, too shared concerns about logistics and agrees that his may be the best plan.   He denied SI, HI or SIB. Does endorse some anxiety r/t discharge plan and getting to his appointment, which has not been finalized. He does not appear to be responding to internal stimuli. He denies depression. He continues with akathisia and BLE discomfort. He reports that moving his legs and Cogentin have been helpful in reducing discomfort.      MEDICATIONS   Scheduled Meds:    benztropine  1 mg Oral TID     divalproex sodium extended-release  1,250 mg Oral At Bedtime     haloperidol  15 mg Oral At Bedtime     metFORMIN  500 mg Oral Daily with supper     PRN Meds:.acetaminophen, alum & mag " "hydroxide-simethicone, benztropine, LORazepam **AND** diphenhydrAMINE **AND** haloperidol, haloperidol lactate **AND** diphenhydrAMINE **AND** LORazepam, haloperidol, hydrOXYzine, melatonin, nicotine     ALLERGIES   No Known Allergies     MENTAL STATUS EXAM   Vitals: /60   Pulse 67   Temp 97.2  F (36.2  C) (Tympanic)   Resp 16   Wt 59.1 kg (130 lb 6.4 oz)   SpO2 94%   BMI 19.83 kg/m      Appearance: Alert, oriented, dressed in hospital scrubs, thin, somewhat unkempt, body odor noticeable.  Attitude: cooperative  Eye Contact: intense, unblinking  Mood: \"good\"  Affect: Blunted, reduced range, irritable   Speech: Normal rate and rhythm   Psychomotor Behavior: No TD or rigidity. Some akathisia present, noted restless when standing. Paces.  Thought Process: Illogical at times, some disorganization   Associations: No loose associations   Thought Content: No SI or HI. No SIB. Denies AVH. Paranoia and other delusional thought (possibly mind-reading) improving  Insight: Poor  Judgment: Poor  Oriented to: Person, place, and time  Attention Span and Concentration: Intact  Recent and Remote Memory: Intact  Language: English with appropriate syntax and vocabulary  Fund of Knowledge: Low to average   Muscle Strength and Tone: No apparent rigidity  Gait and Station: Grossly normal        LABS   No results found for this or any previous visit (from the past 24 hour(s)).      IMPRESSION     This is a 27 year old male with a PMH of SZAD, bipolar type and polysubstance abuse (THC, meth) who presents in a psychotic episode occurring in the context of medication non-adherence and substance use, namely THC. The patient has a history of multiple hospitalization in the past, most recently in January 2023 for a similar presentation. He also has a history of commitment. He has struggled with social and occupational functioning, currently being unemployed and living with his father. He would benefit from hospitalization for " management of this acute psychiatric crisis.     In terms of treatment, will start him back on Haldol with plan to use Haldol Dec and Depakote given him doing well on these medications in the past and this being his preference.    Today: Irvin was pleasant and cooperative. His range is mostly restricted, but smiled in conversation, which is encouraging. He would like to start the Haldol Dec, but has some anxiety regarding the logistics of being homeless, not having a cell phone, not being paid until 9/3 and making it to his medication management appointment. I informed pt that SW is working on securing med management and therapy through [facility] [employer]. Also, SW is working on finding a homeless shelter for him in the [place] area. He did express that he is able to use [employer] through his insurance for rides to his appointment, but needs to get a phone to set these up. We discussed the potential of initiating Haldol Dec treatment through his outpatient med management, whether through [facility]'s or alternate clinic if needed. He would continue on oral Haldol until these appointments were arranged. I shared that this treatment modality may be more consistent at this time considering his discharge plan has not yet been optimized with specific dates and shelter. Joseph agreed with this plan.  Will continue to consider Haldol Dec, if appropriate, and linear with his disposition considering standards of converting oral to SANCHEZ of Haldol.        DIAGNOSES     1. Schizoaffective disorder, bipolar type, multiple episodes, in acute episode  2. Cannabis use disorder, severe  3. Stimulant (meth) use disorder, unspecified       PLAN     Location: Unit 5 ([facility])  Legal Status: Orders Placed This Encounter      Voluntary    Safety Assessment:    Behavioral Orders   Procedures     Code 1 - Restrict to Unit     Routine Programming     As clinically indicated     Status 15     Every 15 minutes.      PTA psychotropic  medications held:      - None, as not taking any     Previously on Zyprexa 10 mg BID, Trazodone 100 mg at bedtime, and Strattera 60 mg daily not being resumed given not taking and patient not interested. Also, simplifying regimen.     PTA psychotropic medications continued/changed:      - None, as not taking any     New psychotropic medications initiated:      - Haldol 5 mg BID (previously on up to 10 mg BID) -> 10 mg at bedtime on 8/10->12.5 mg 8/11,-> 15 mg at bedtime 8/14  - Depakote 1,000 mg at bedtime -> 1,250 mg at bedtime as of 8/9  - Cogentin 0.5 mg BID, changed to 1 mg BID previous provider, increased 8/14 to 1 mg TID for EPSE  - Standard unit prn agents, including Haldol prn agitation    Today's Changes:    -none    Future considerations: Haldol SANCHEZ, pt requesting.      Programming: Patient will be treated in a therapeutic milieu with appropriate individual and group therapies. Education will be provided on diagnoses, medications, and treatments.     Medical diagnoses:  Per medicine    #. Prediabetes  - Conservative management (diet, exercise)  - Haldol is low risk of metabolic syndrome  - Metformin 500 mg at bedtime started     Consult: none  Tests: none     Anticipated LOS: > 7 days   Disposition: Home with outpatient services (medication management)         ATTESTATION      GORGE Antonio, CNP

## 2023-08-17 NOTE — PROGRESS NOTES
1:1 time with the patient.  No changes made to the discharge plan at this time.   See the AVS for follow up appointments and recommendations.      Placed a call to Marlene Murray in [place], had to leave a voicemail and will call again tomorrow.

## 2023-08-17 NOTE — PLAN OF CARE
Face to face end of shift report will be communicated to oncoming RN.    Problem: Adult Behavioral Health Plan of Care  Goal: Patient-Specific Goal (Individualization)  Description: Pt will follow recommendations of treatment team.  Pt will be compliant with medications.  Pt will attend 50 % of groups.  Pt will sleep 6-8 hours each night.       Outcome: Progressing     Problem: Thought Process Alteration  Goal: Optimal Thought Clarity  Description: Pt will have a logical and linear conversation.  Pt will verbalize 2-3 coping skills     Outcome: Progressing     Problem: Suicidal Behavior  Goal: Suicidal Behavior is Absent or Managed  Description: Patient will remain free of self harm while on the unit.  Patient will be able to list 2 coping skills by discharge.  Patient will inform staff of suicidal thoughts while on the unit.   Outcome: Progressing   Face to face end of shift report obtained from ARIN Calvert. Pt observed resting in bed.  Pt appears to be sleeping in bed with eyes closed.15 minutes and PRN safety checks completed with no noted complains. No self harm or delusional comments noted or reported so far this shift.   0605-Pt appeared to had slept 6.5 hours. Cogentin1 mg given per request. Pt appears to have slight tense posturing but able to move extremities without difficulty.

## 2023-08-18 PROCEDURE — 250N000013 HC RX MED GY IP 250 OP 250 PS 637: Performed by: STUDENT IN AN ORGANIZED HEALTH CARE EDUCATION/TRAINING PROGRAM

## 2023-08-18 PROCEDURE — 99239 HOSP IP/OBS DSCHRG MGMT >30: CPT

## 2023-08-18 PROCEDURE — 250N000013 HC RX MED GY IP 250 OP 250 PS 637

## 2023-08-18 RX ORDER — BENZTROPINE MESYLATE 1 MG/1
1 TABLET ORAL 3 TIMES DAILY
Qty: 90 TABLET | Refills: 0 | Status: SHIPPED | OUTPATIENT
Start: 2023-08-18 | End: 2024-06-28

## 2023-08-18 RX ORDER — HALOPERIDOL 5 MG/1
15 TABLET ORAL AT BEDTIME
Qty: 90 TABLET | Refills: 0 | Status: SHIPPED | OUTPATIENT
Start: 2023-08-18 | End: 2024-06-28

## 2023-08-18 RX ORDER — DIVALPROEX SODIUM 250 MG/1
250 TABLET, EXTENDED RELEASE ORAL AT BEDTIME
Qty: 30 TABLET | Refills: 0 | Status: SHIPPED | OUTPATIENT
Start: 2023-08-18 | End: 2024-06-28

## 2023-08-18 RX ORDER — DIVALPROEX SODIUM 500 MG/1
1000 TABLET, EXTENDED RELEASE ORAL AT BEDTIME
Qty: 60 TABLET | Refills: 0 | Status: SHIPPED | OUTPATIENT
Start: 2023-08-18 | End: 2024-06-28

## 2023-08-18 RX ADMIN — BENZTROPINE MESYLATE 1 MG: 1 TABLET ORAL at 08:21

## 2023-08-18 RX ADMIN — NICOTINE POLACRILEX 2 MG: 2 GUM, CHEWING ORAL at 11:03

## 2023-08-18 RX ADMIN — NICOTINE POLACRILEX 2 MG: 2 GUM, CHEWING ORAL at 07:48

## 2023-08-18 ASSESSMENT — ACTIVITIES OF DAILY LIVING (ADL)
DRESS: INDEPENDENT;SCRUBS (BEHAVIORAL HEALTH)
ADLS_ACUITY_SCORE: 53
HYGIENE/GROOMING: INDEPENDENT
ADLS_ACUITY_SCORE: 53
LAUNDRY: UNABLE TO COMPLETE
ORAL_HYGIENE: INDEPENDENT

## 2023-08-18 NOTE — PLAN OF CARE
"Discharge Note  ALMA SELBY RN  8/18/2023  11:58 PM       Patient Discharged to [facility] on 8/18/2023 11:58 PM via [facility] accompanied by self.     Patient informed of discharge instructions in AVS. patient verbalizes understanding and denies having any questions pertaining to AVS. Patient stable at time of discharge. Patient denies SI, HI, and thoughts of self harm at time of discharge. All personal belongings returned to patient. Discharge prescriptions sent to [facility] pharmacy via electronic communication.  RX's sent with pt.  Psych evaluation, history and physical, AVS, and discharge summary faxed to next level of care- as appropriate by .           ALMA SELBY RN  8/18/2023  7:38 AM  Face to face shift report received from ARIN White. Rounding completed, pt observed.     0850-Pt irritable this morning.  Not cooperative with nursing assessment- when asked if pt had pain -pt stated \"read my reports\".  Denies anxiety, depression, SI, HI, and hallucinations.  Took am med.  Paces and restless.  1110-Pt was informed of his discharge by  and provider Martin.  Writer tried to review discharge instructions with pt- pt walked away.  Pt will be discharged with copy of discharge instructions and meds that were prescribed to [facility].            Problem: Adult Behavioral Health Plan of Care  Goal: Patient-Specific Goal (Individualization)  Description: Pt will follow recommendations of treatment team.  Pt will be compliant with medications.  Pt will attend 50 % of groups.  Pt will sleep 6-8 hours each night.   8-17-23 - Pt is a no wean d/t labile, unpredictable behavior. Treatment team to reassess daily.   Outcome: Not Progressing     Problem: Thought Process Alteration  Goal: Optimal Thought Clarity  Description: Pt will have a logical and linear conversation.  Pt will verbalize 2-3 coping skills   Outcome: Progressing     Problem: Suicidal Behavior  Goal: Suicidal Behavior is Absent or " Managed  Description: Patient will remain free of self harm while on the unit.  Patient will be able to list 2 coping skills by discharge.  Patient will inform staff of suicidal thoughts while on the unit.   Outcome: Progressing

## 2023-08-18 NOTE — PROGRESS NOTES
[facility] PSYCHIATRY  DISCHARGE SUMMARY     DISCHARGE DATA     Joseph Stephens MRN# [id number]   Age: 27 year old YOB: 1995     Date of Admission: 8/5/2023  Date of Discharge: August 18, 2023  Discharge Provider: GORGE Antonio CNP       REASON FOR ADMISSION     psychosis       DISCHARGE DIAGNOSES     1. Schizoaffective disorder, bipolar type, multiple episodes, in acute episode  2. Cannabis use disorder, severe  3. Stimulant (meth) use disorder, unspecified       CONSULTS          CLINICAL NUTRITION SERVICES  -  REASSESSMENT NOTE     Joseph Stephens :      27 yom admitted for psychosis. Medical/psych hx includes tobacco abuse, prediabetes (A1C 5.9 on 8/5/23), amphetamine use disorder, depressive disorder, bipolar 1 disorder, schizoaffective disorder. No new weight to assess. Good appetite, adequate intake.     Diet Order: Regular, double portions  Intake: 16 meals with 100% intake     Height: Data Unavailable  Weight: 130 lbs 6.4 oz  Body mass index is 19.83 kg/m .  Weight Status:  Normal BMI  Weight History: 10% loss in 5 months, 13% loss in 7 months       Wt Readings from Last 10 Encounters:   08/05/23 59.1 kg (130 lb 6.4 oz)   06/07/23 61.2 kg (134 lb 14.7 oz)   04[facility]15/[facility]160 lb)* [facility]   01/11/23 68.3 kg (150 lb 9.5 oz)* [facility] [place]   11/12/22 69 kg (152 lb 3.2 oz)* [facility]   06/15/22 61.2 kg (135 lb)   08/21/21 58.8 kg (129 lb 11.2 oz)* [facility]   08/11/21 61.2 kg (135 lb)   03/19/18 65.3 kg (144 lb)   02/01/18 61.7 kg (136 lb)   01/25/18 63.5 kg (140 lb)      Weight used to calculate needs: 59.1kg - current weight  Estimated Energy Needs: 8415-9608 kcals (30-35 Kcal/Kg)   Estimated Protein Needs: 60-70 grams protein (1-1.2 g pro/Kg)     Malnutrition Diagnosis: Severe malnutrition  In Context of:  Chronic illness or disease, Environmental or social circumstances     NUTRITION DIAGNOSIS:  Malnutrition related to inadequate energy/protein intake as  evidenced by 13% weight loss in 7 months     NUTRITION RECOMMENDATIONS  - Encourage intake during meal times as needed  - Monitor weight                HOSPITAL COURSE     Legal status: Orders Placed This Encounter      Voluntary    Patient was admitted to unit 5 due to the aforementioned presentation. The patient was placed under 15 minute checks to ensure patient safety. The patient participated in unit programming and groups as able.    Mr. Stephens did not require seclusion/restraint during hospitalization.     We reviewed with Mr. Stephens current and past medication trials including duration, dose, response and side effects. During this hospitalization, the following changes to the patient's psychotropic medications were made:    PTA psychotropic medications stopped:     -Previously on Zyprexa 10 mg BID, Trazodone 100 mg at bedtime, and Strattera 60 mg daily not being resumed given not taking and patient not interested.    PTA psychotropic medications continued/changed:     -none    New psychotropic medications tried and stopped:     -none    New psychotropic medications initiated:     - Haldol 15 mg  PO qhs  - Depakote  ER 1,250 mg PO qhs  - Cogentin 1 mg TID    Joseph Stephens was admitted to [facility] for psychosis.He was experiencing verbal agitation, anxiety and was nonsensical in conversation. Complicating his presentation was his history of substance use, primarily marijuana. It appears that Joseph has been off his psychotropic medications for 3.5 years. It appears he was most recent treatment was with Zyprexa, Trazodone and Strattera. He was not interested in initiating this medication, but was amenable to starting Haldol and Depakote, which appears he has taken historically. He was started at 5 mg PO BID and titrated to 15 mg PO at bedtime at discharge. He did report some leg stiffness/akathisia, so Cogentin was imitated at 1 mg BID and titrated to 1 mg TID as we  titrated him to his discharge dose of Haldol. The plan was discussed to transition to Haldol Decanoate, but ultimately Joseph decided to wait unti he could establish outpatient care for ease of transition. Depakote ER was started at 1000 mg at bedtime and titrated to 1250 mg at discharge. His VPA level at Depakote ER 1250 mg PO was 82.3 micrograms/mL on 8/12/23. Would monitor with changes in dosage or condition. Also, his HgbA1C was 5.9% with a serum glucose of 142 mg/dL on 8/5/23, subsequently placed on Metformin 500 mg daily with supper, which was continued at discharge. Along the course of his treatment, Joseph's psychosis improved with decreased intensity. Although it appears he has dystonia and akathisia, which he acknowledged and would like to continue with Haldol and Cogentin, which has responded well during his stay. He was able to participate in his discharge planning. He also denied SI, HI or SIB. He did not display delusions. He had a small degree of suspicion regarding staff but was not bothersome per Joseph. He verbalized he was prepared for discharge and is aware of his telehealth appointment 8/21/23 with [facility] for medication management. Joseph was discharged to [facility] per patient request.     With these changes and supports the patient noticed improvement in their symptoms and felt sufficiently ready for discharge. As a result, Joseph Stephens was discharged. At the time of discharge, Joseph Stephens was determined to not be a danger to self or others. The patient was also medically stable for discharge. At the current time of discharge, the patient does not meet criteria for involuntary hospitalization. On the day of discharge, the patient reports that they do not have suicidal or homicidal ideation. Steps taken to minimize risk include: assessing patient s behavior and thought process daily during hospital stay, discharging patient with  adequate plan for follow up for mental and physical health and discussing safety plan of returning to the hospital should the patient ever have thoughts of harming themselves or others. Therefore, based on all available evidence including the factors cited above, the patient does not appear to be at imminent risk for self-harm, and is appropriate for outpatient level of care. However, if patient uses substances or is medication non-adherent, their risk of decompensation and SI will be elevated. This was discussed with the patient.       DISCHARGE MEDICATIONS     Discharge Medication List as of 8/18/2023 11:16 AM        START taking these medications    Details   benztropine (COGENTIN) 1 MG tablet Take 1 tablet (1 mg) by mouth 3 times daily, Disp-90 tablet, R-0, E-Prescribe      metFORMIN (GLUCOPHAGE) 500 MG tablet Take 1 tablet (500 mg) by mouth daily (with dinner), Disp-30 tablet, R-0, E-Prescribe           CONTINUE these medications which have CHANGED    Details   !! divalproex sodium extended-release (DEPAKOTE ER) 250 MG 24 hr tablet Take 1 tablet (250 mg) by mouth At Bedtime In addition to Depakote ER 1000 mg for total daily dose of 1250 mg, Disp-30 tablet, R-0, E-Prescribe      !! divalproex sodium extended-release (DEPAKOTE ER) 500 MG 24 hr tablet Take 2 tablets (1,000 mg) by mouth At Bedtime Plus one 250 mg tablet for total dose of 1250 mg, Disp-60 tablet, R-0, E-Prescribe      haloperidol (HALDOL) 5 MG tablet Take 3 tablets (15 mg) by mouth At Bedtime, Disp-90 tablet, R-0, E-Prescribe       !! - Potential duplicate medications found. Please discuss with provider.        CONTINUE these medications which have NOT CHANGED    Details   hydrOXYzine (VISTARIL) 25 MG capsule Take 50 mg by mouth 3 times daily as needed for anxiety, Historical           STOP taking these medications       atomoxetine (STRATTERA) 60 MG capsule Comments:   Reason for Stopping:         OLANZapine (ZYPREXA) 10 MG tablet Comments:  "  Reason for Stopping:         traZODone (DESYREL) 100 MG tablet Comments:   Reason for Stopping:                    MENTAL STATUS EXAM   Vitals: /78   Pulse 67   Temp 97.1  F (36.2  C) (Tympanic)   Resp 16   Wt 59.1 kg (130 lb 6.4 oz)   SpO2 98%   BMI 19.83 kg/m      Appearance: Alert, oriented, dressed in hospital scrubs, appears stated age   Attitude: somewhat cooperative  Eye Contact: intense  Mood: \"I'm fine\"  Affect: slightly irritable  Speech: Normal rate and rhythm   Psychomotor Behavior: No tremor, rigidity, or psychomotor abnormality   Thought Process: Logical, goal directed   Associations: No loose associations   Thought Content: Denies SI or plan. No SIB. Denies A/V hallucinations. No evidence of delusional thought.  Insight: Good  Judgment: Good  Oriented to: Person, place, and time  Attention Span and Concentration: Intact  Recent and Remote Memory: Intact  Language: English with appropriate syntax and vocabulary  Fund of Knowledge: Average  Muscle Strength and Tone: Grossly normal  Gait and Station: Grossly normal       DISCHARGE PLAN     1.  Education given regarding diagnostic and treatment options with risks, benefits and alternatives with adequate verbalization of understanding.  2.  Discharge to homelessness. Upon detailed review of risk factors, patient amenable for release.   3.  Continue aforementioned medications and associated medication changes with follow-up by outpatient provider.  4.  Crisis management planning in place.    5.  Nursing and  to review further discharge recommendations.   6.  Patient is being discharged with the following appointments as detailed below.    [facility]  [address], [address]  [place] [address] [phone]  Psychiatry: Nitin Merino- August 21st 5:55pm- Teleahealth          DISCHARGE SERVICES PROVIDED     40 minutes spent on discharge services, including:  Final examination of patient.  Review and discussion of " hospital stay.  Instructions for continued outpatient care/goals.  Preparation of discharge records.  Preparation of medications refills and new prescriptions.  Preparation of applicable referral forms.        ATTESTATION     GORGE Antonio CNP       LABS THIS ADMISSION     Results for orders placed or performed during the hospital encounter of 08/05/23   Ethyl Alcohol Level     Status: Normal   Result Value Ref Range    Alcohol ethyl <0.01 <=0.01 g/dL   Basic metabolic panel     Status: Abnormal   Result Value Ref Range    Sodium 136 136 - 145 mmol/L    Potassium 3.5 3.4 - 5.3 mmol/L    Chloride 101 98 - 107 mmol/L    Carbon Dioxide (CO2) 21 (L) 22 - 29 mmol/L    Anion Gap 14 7 - 15 mmol/L    Urea Nitrogen 5.4 (L) 6.0 - 20.0 mg/dL    Creatinine 0.75 0.67 - 1.17 mg/dL    Calcium 9.2 8.6 - 10.0 mg/dL    Glucose 142 (H) 70 - 99 mg/dL    GFR Estimate >90 >60 mL/min/1.73m2   Urine Drugs of Abuse Screen Panel 13     Status: Abnormal   Result Value Ref Range    Cannabinoids (31-les-6-carboxy-9-THC) Detected (A) Not Detected, Indeterminate    Phencyclidine Not Detected Not Detected, Indeterminate    Cocaine (Benzoylecgonine) Not Detected Not Detected, Indeterminate    Methamphetamine (d-Methamphetamine) Not Detected Not Detected, Indeterminate    Opiates (Morphine) Not Detected Not Detected, Indeterminate    Amphetamine (d-Amphetamine) Not Detected Not Detected, Indeterminate    Benzodiazepines (Nordiazepam) Not Detected Not Detected, Indeterminate    Tricyclic Antidepressants (Desipramine) Not Detected Not Detected, Indeterminate    Methadone Not Detected Not Detected, Indeterminate    Barbiturates (Butalbital) Not Detected Not Detected, Indeterminate    Oxycodone Not Detected Not Detected, Indeterminate    Propoxyphene (Norpropoxyphene) Not Detected Not Detected, Indeterminate    Buprenorphine Not Detected Not Detected, Indeterminate   Asymptomatic COVID-19 Virus (Coronavirus) by PCR Nasopharyngeal     Status: Normal     Specimen: Nasopharyngeal; Swab   Result Value Ref Range    SARS CoV2 PCR Negative Negative    Narrative    Testing was performed using the Xpert Xpress SARS-CoV-2 Assay on the Cepheid Gene-Xpert Instrument Systems. Additional information about this Emergency Use Authorization (EUA) assay can be found via the Lab Guide. This test should be ordered for the detection of SARS-CoV-2 in individuals who meet SARS-CoV-2 clinical and/or epidemiological criteria as well as from individuals without symptoms or other reasons to suspect COVID-19. Test performance for asymptomatic patients has only been established in anterior nasal swab specimens. This test is for in vitro diagnostic use under the FDA EUA for laboratories certified under CLIA to perform high complexity testing. This test has not been FDA cleared or approved. A negative result does not rule out the presence of PCR inhibitors in the specimen or target RNA concentration below the limit of detection for the assay. The possibility of a false negative should be considered if the patient's recent exposure or clinical presentation suggests COVID-19. This test was validated by [facility] laboratory. This laboratory is certified under the Clinical Laboratory Improvement Amendments (CLIA) as qualified to perform high complexity testing.   CBC with platelets and differential     Status: None   Result Value Ref Range    WBC Count 9.0 4.0 - 11.0 10e3/uL    RBC Count 5.26 4.40 - 5.90 10e6/uL    Hemoglobin 15.2 13.3 - 17.7 g/dL    Hematocrit 44.0 40.0 - 53.0 %    MCV 84 78 - 100 fL    MCH 28.9 26.5 - 33.0 pg    MCHC 34.5 31.5 - 36.5 g/dL    RDW 12.4 10.0 - 15.0 %    Platelet Count 195 150 - 450 10e3/uL    % Neutrophils 48 %    % Lymphocytes 45 %    % Monocytes 6 %    % Eosinophils 0 %    % Basophils 1 %    % Immature Granulocytes 0 %    NRBCs per 100 WBC 0 <1 /100    Absolute Neutrophils 4.3 1.6 - 8.3 10e3/uL    Absolute Lymphocytes 4.1 0.8 - 5.3 10e3/uL     Absolute Monocytes 0.5 0.0 - 1.3 10e3/uL    Absolute Eosinophils 0.0 0.0 - 0.7 10e3/uL    Absolute Basophils 0.1 0.0 - 0.2 10e3/uL    Absolute Immature Granulocytes 0.0 <=0.4 10e3/uL    Absolute NRBCs 0.0 10e3/uL   Hepatic panel     Status: Normal   Result Value Ref Range    Protein Total 7.5 6.4 - 8.3 g/dL    Albumin 4.7 3.5 - 5.2 g/dL    Bilirubin Total 0.4 <=1.2 mg/dL    Alkaline Phosphatase 68 40 - 129 U/L    AST 16 0 - 45 U/L    ALT 11 0 - 70 U/L    Bilirubin Direct <0.20 0.00 - 0.30 mg/dL   Hemoglobin A1c     Status: Abnormal   Result Value Ref Range    Estimated Average Glucose 123 mg/dL    Hemoglobin A1C 5.9 (H) <5.7 %   Lipid Profile     Status: Abnormal   Result Value Ref Range    Cholesterol 87 <200 mg/dL    Triglycerides 89 <150 mg/dL    Direct Measure HDL 30 (L) >=40 mg/dL    LDL Cholesterol Calculated 39 <=100 mg/dL    Non HDL Cholesterol 57 <130 mg/dL    Narrative    Cholesterol  Desirable:  <200 mg/dL    Triglycerides  Normal:  Less than 150 mg/dL  Borderline High:  150-199 mg/dL  High:  200-499 mg/dL  Very High:  Greater than or equal to 500 mg/dL    Direct Measure HDL  Female:  Greater than or equal to 50 mg/dL   Male:  Greater than or equal to 40 mg/dL    LDL Cholesterol  Desirable:  <100mg/dL  Above Desirable:  100-129 mg/dL   Borderline High:  130-159 mg/dL   High:  160-189 mg/dL   Very High:  >= 190 mg/dL    Non HDL Cholesterol  Desirable:  130 mg/dL  Above Desirable:  130-159 mg/dL  Borderline High:  160-189 mg/dL  High:  190-219 mg/dL  Very High:  Greater than or equal to 220 mg/dL   Valproic acid     Status: Normal   Result Value Ref Range    Valproic acid 82.3   ug/mL   Comprehensive metabolic panel     Status: Normal   Result Value Ref Range    Sodium 136 136 - 145 mmol/L    Potassium 4.6 3.4 - 5.3 mmol/L    Chloride 100 98 - 107 mmol/L    Carbon Dioxide (CO2) 25 22 - 29 mmol/L    Anion Gap 11 7 - 15 mmol/L    Urea Nitrogen 16.2 6.0 - 20.0 mg/dL    Creatinine 0.76 0.67 - 1.17 mg/dL     Calcium 9.2 8.6 - 10.0 mg/dL    Glucose 84 70 - 99 mg/dL    Alkaline Phosphatase 52 40 - 129 U/L    AST 11 0 - 45 U/L    ALT 5 0 - 70 U/L    Protein Total 6.6 6.4 - 8.3 g/dL    Albumin 4.3 3.5 - 5.2 g/dL    Bilirubin Total 0.3 <=1.2 mg/dL    GFR Estimate >90 >60 mL/min/1.73m2   CBC with platelets differential     Status: None    Narrative    The following orders were created for panel order CBC with platelets differential.  Procedure                               Abnormality         Status                     ---------                               -----------         ------                     CBC with platelets and d...[[id number]]                      Final result                 Please view results for these tests on the individual orders.   Urine Drugs of Abuse Screen     Status: Abnormal    Narrative    The following orders were created for panel order Urine Drugs of Abuse Screen.  Procedure                               Abnormality         Status                     ---------                               -----------         ------                     Urine Drugs of Abuse Scr...[[id number]]  Abnormal            Final result                 Please view results for these tests on the individual orders.

## 2023-08-18 NOTE — PROGRESS NOTES
"Pt is discharging at the recommendation of the treatment team. Pt is discharging to [facility] shelter transported by [employer]. Pt denies having any thoughts of hurting themself or anyone else. Pt denies anxiety or depression. Pt has follow up with Psychiatry. Discharge instructions, including; demographic sheet, psychiatric evaluation, discharge summary, and AVS were faxed to these next level of care providers.     Psychiatry appointment set up for next Monday, they currently do not have any therapist open who accept Medicare but he was placed on a wait list.     Spoke with Pt letting him know that he will be discharging today, Pt said \"It doesn't really matter does it\" It was explained to Pt that homeless shelters were called in the [facility] and nothing was open, and that the [facility] shelter in [place] will accept him and resources will be listed for him to give a call to wait for openings. Pt walked away and refused to sign belongings sheet and AVS.     [facility]  [address], [address]  [place] [address]  [phone]  Psychiatry: Nitin Merino- August 21st 5:55pm- Teleahealth    Called [facility] Hotline and left a voicemail.   "

## 2023-08-18 NOTE — PROGRESS NOTES
"[facility] PSYCHIATRY  PROGRESS NOTE     SUBJECTIVE     Prior to interviewing the patient, I met with nursing and reviewed patient's clinical condition. We discussed clinical care both before and after the interview. I have reviewed the patient's clinical course by review of records including previous notes, labs, and vital signs.     Per nursing, the patient had the following behavioral events over the last 24-hours: irritable.Taking medications as prescribed. Slept 6 hours.     On psychiatric interview, he was walking in the MHICU. He did greet me with a smile. He tells me that the nursing staff is not telling the truth and fabricate a truth about him. He states that myself and the nursing staff make \"assumptions\" about him and that \"this isn't my first rodeo\". He became irritable in conversation, telling me he is not going to a homeless shelter and [facility] I asked him which option would be preferable for discharge. He declined to give any other option other than in [facility]. He would rather go to longterm or the [facility] and \"serve his time\". He no longer wanted to talk.     MEDICATIONS   Scheduled Meds:    benztropine  1 mg Oral TID     divalproex sodium extended-release  1,250 mg Oral At Bedtime     haloperidol  15 mg Oral At Bedtime     metFORMIN  500 mg Oral Daily with supper     PRN Meds:.acetaminophen, alum & mag hydroxide-simethicone, benztropine, LORazepam **AND** diphenhydrAMINE **AND** haloperidol, haloperidol lactate **AND** diphenhydrAMINE **AND** LORazepam, haloperidol, hydrOXYzine, melatonin, nicotine     ALLERGIES   No Known Allergies     MENTAL STATUS EXAM   Vitals: /78   Pulse 67   Temp 97.1  F (36.2  C) (Tympanic)   Resp 16   Wt 59.1 kg (130 lb 6.4 oz)   SpO2 98%   BMI 19.83 kg/m      Appearance: Alert, oriented, dressed in hospital scrubs, thin, somewhat unkempt, body odor noticeable.  Attitude: uncooperative  Eye Contact: intense, " "unblinking  Mood: \"I' fine\"  Affect: Blunted, reduced range, irritable   Speech: Normal rate and rhythm   Psychomotor Behavior: No TD or rigidity. Some akathisia present, noted restless when standing. Paces.  Thought Process: Illogical at times, some disorganization   Associations: No loose associations   Thought Content: No SI or HI. No SIB. Denies AVH. Paranoia and other delusional thought (possibly mind-reading) improving  Insight: Poor  Judgment: Poor  Oriented to: Person, place, and time  Attention Span and Concentration: Intact  Recent and Remote Memory: Intact  Language: English with appropriate syntax and vocabulary  Fund of Knowledge: Low to average   Muscle Strength and Tone: No apparent rigidity  Gait and Station: Grossly normal        LABS   No results found for this or any previous visit (from the past 24 hour(s)).      IMPRESSION     This is a 27 year old male with a PMH of SZAD, bipolar type and polysubstance abuse (THC, meth) who presents in a psychotic episode occurring in the context of medication non-adherence and substance use, namely THC. The patient has a history of multiple hospitalization in the past, most recently in January 2023 for a similar presentation. He also has a history of commitment. He has struggled with social and occupational functioning, currently being unemployed and living with his father. He would benefit from hospitalization for management of this acute psychiatric crisis.     In terms of treatment, will start him back on Haldol with plan to use Haldol Dec and Depakote given him doing well on these medications in the past and this being his preference.    Today: Joseph continues to fluctuate in his decision regarding his disposition. It appears he may be attempting to sabotage his discharge to stay in the hospital longer. He has changed his mind regarding where he would like to discharge to several times in the last few days. We will set his [facility] " appointment and contact homeless resources today. If we are able to obtain a [facility] appointment, we will discharge him as soon as today.          DIAGNOSES     1. Schizoaffective disorder, bipolar type, multiple episodes, in acute episode  2. Cannabis use disorder, severe  3. Stimulant (meth) use disorder, unspecified       PLAN     Location: Unit [facility] ([facility])  Legal Status: Orders Placed This Encounter      Voluntary    Safety Assessment:    Behavioral Orders   Procedures     Code 1 - Restrict to Unit     Routine Programming     As clinically indicated     Status 15     Every 15 minutes.      PTA psychotropic medications held:      - None, as not taking any     Previously on Zyprexa 10 mg BID, Trazodone 100 mg at bedtime, and Strattera 60 mg daily not being resumed given not taking and patient not interested. Also, simplifying regimen.     PTA psychotropic medications continued/changed:      - None, as not taking any     New psychotropic medications initiated:      - Haldol 5 mg BID (previously on up to 10 mg BID) -> 10 mg at bedtime on 8/10->12.5 mg 8/11,-> 15 mg at bedtime 8/14  - Depakote 1,000 mg at bedtime -> 1,250 mg at bedtime as of 8/9  - Cogentin 0.5 mg BID, changed to 1 mg BID previous provider, increased 8/14 to 1 mg TID for EPSE  - Standard unit prn agents, including Haldol prn agitation    Today's Changes:    -none    Future considerations: Haldol SANCHEZ, pt requesting.      Programming: Patient will be treated in a therapeutic milieu with appropriate individual and group therapies. Education will be provided on diagnoses, medications, and treatments.     Medical diagnoses:  Per medicine    #. Prediabetes  - Conservative management (diet, exercise)  - Haldol is low risk of metabolic syndrome  - Metformin 500 mg at bedtime started     Consult: none  Tests: none     Anticipated LOS: > 7 days   Disposition: Home with outpatient services (medication management)         ATTESTATION      GORGE Antonio,  CNP

## 2023-08-18 NOTE — PLAN OF CARE
Problem: Adult Behavioral Health Plan of Care  Goal: Patient-Specific Goal (Individualization)  Description: Pt will follow recommendations of treatment team.  Pt will be compliant with medications.  Pt will attend 50 % of groups.  Pt will sleep 6-8 hours each night.   8-17-23 - Pt is a no wean d/t labile, unpredictable behavior. Treatment team to reassess daily.     Note: Rounding complete.  Pt observed sleeping with regular and unlabored respirations.      Pt has been in bed with eyes closed and regular respirations.  15 minute and PRN checks all night.  No complaints offered.  Will continue to monitor.     Pt slept 6.5 hours.     Face to face end of shift communicated to oncoming ARIN.     Cindy GRAY  August 18, 2023  6:22 AM     Goal Outcome Evaluation:

## 2023-08-22 ENCOUNTER — TELEPHONE (OUTPATIENT)
Dept: BEHAVIORAL HEALTH | Facility: HOSPITAL | Age: 28
End: 2023-08-22

## 2023-08-22 NOTE — TELEPHONE ENCOUNTER
"[facility]: Post-Hospital Discharge Note     Situation   Post hospital discharge call placed 08/22/23.      Joseph did not answer. A message was left.  The number listed in the chart went to a busy signal. Messages are left with a call back number should they need to reach staff with any questions, need for additional resources, or need assistance setting up a post hospitalization follow up appointments, if unable to do so independently.    Inpatient Mental Health Admission Information:  Admission Date: 8/5/23  Admission Reason: Psychosis    Inpatient Mental Health Discharge Information:  Discharge Date: 8/18/23  Discharged to: Shelter  Discharge Diagnosis: 1. Schizoaffective disorder, bipolar type, multiple episodes, in acute episode  2. Cannabis use disorder, severe  3. Stimulant (meth) use disorder, unspecified    Background    The following information is obtained from the hospital after visit summary and inpatient provider notes.     \"Legal status: Orders Placed This Encounter      Voluntary     Patient was admitted to unit 5 due to the aforementioned presentation. The patient was placed under 15 minute checks to ensure patient safety. The patient participated in unit programming and groups as able.     Mr. Stephens did not require seclusion/restraint during hospitalization.      We reviewed with Mr. Stephens current and past medication trials including duration, dose, response and side effects. During this hospitalization, the following changes to the patient's psychotropic medications were made:     PTA psychotropic medications stopped:      -Previously on Zyprexa 10 mg BID, Trazodone 100 mg at bedtime, and Strattera 60 mg daily not being resumed given not taking and patient not interested.     PTA psychotropic medications continued/changed:      -none     New psychotropic medications tried and stopped:      -none     New psychotropic medications initiated:      - Haldol 15 mg  PO qhs  - Depakote  ER " 1,250 mg PO qhs  - Cogentin 1 mg TID     Joseph Stephens was admitted to [facility] for psychosis.He was experiencing verbal agitation, anxiety and was nonsensical in conversation. Complicating his presentation was his history of substance use, primarily marijuana. It appears that Joseph has been off his psychotropic medications for 3.5 years. It appears he was most recent treatment was with Zyprexa, Trazodone and Strattera. He was not interested in initiating this medication, but was amenable to starting Haldol and Depakote, which appears he has taken historically. He was started at 5 mg PO BID and titrated to 15 mg PO at bedtime at discharge. He did report some leg stiffness/akathisia, so Cogentin was imitated at 1 mg BID and titrated to 1 mg TID as we titrated him to his discharge dose of Haldol. The plan was discussed to transition to Haldol Decanoate, but ultimately Joseph decided to wait unti he could establish outpatient care for ease of transition. Depakote ER was started at 1000 mg at bedtime and titrated to 1250 mg at discharge. His VPA level at Depakote ER 1250 mg PO was 82.3 micrograms/mL on 8/12/23. Would monitor with changes in dosage or condition. Also, his HgbA1C was 5.9% with a serum glucose of 142 mg/dL on 8/5/23, subsequently placed on Metformin 500 mg daily with supper, which was continued at discharge. Along the course of his treatment, Joseph's psychosis improved with decreased intensity. Although it appears he has dystonia and akathisia, which he acknowledged and would like to continue with Haldol and Cogentin, which has responded well during his stay. He was able to participate in his discharge planning. He also denied SI, HI or SIB. He did not display delusions. He had a small degree of suspicion regarding staff but was not bothersome per Joseph. He verbalized he was prepared for discharge and is aware of his telehealth appointment 8/21/23 with [facility] for medication management. Joseph was discharged to [facility] per patient request.      With these changes and supports the patient noticed improvement in their symptoms and felt sufficiently ready for discharge. As a result, Joseph Stephens was discharged. At the time of discharge, Joseph Stephens was determined to not be a danger to self or others. The patient was also medically stable for discharge. At the current time of discharge, the patient does not meet criteria for involuntary hospitalization. On the day of discharge, the patient reports that they do not have suicidal or homicidal ideation. Steps taken to minimize risk include: assessing patient s behavior and thought process daily during hospital stay, discharging patient with adequate plan for follow up for mental and physical health and discussing safety plan of returning to the hospital should the patient ever have thoughts of harming themselves or others. Therefore, based on all available evidence including the factors cited above, the patient does not appear to be at imminent risk for self-harm, and is appropriate for outpatient level of care. However, if patient uses substances or is medication non-adherent, their risk of decompensation and SI will be elevated. This was discussed with the patient.\"        Post Discharge Assessment   How have your symptoms been since being discharged from the hospital? Unable to reach patient.  Do you have your discharge instructions/after visit summary? N/A  Do you have any questions related to your discharge instructions? N/A    Discharge Medication Assessment   Medications were reviewed in full on discharge, including: Medications to be started, medications to be stopped, medications to be continued from preadmission and any side effects.      Prescriptions were e-scribed or sent to their preferred pharmacy at discharge and were able to be filled: Yes  Do you have any " questions about your medications? N/A    Outpatient Plan/Future Appointments  Discharge follow up appointment scheduled within 14 days of discharging from hospital? Yes   Health Care Follow-up:  [facility]  [address], [address]  [place], [place] [address] [phone]  Psychiatry: Nitin Merino- August 21st 5:55pm- Teleahealth  [facility] Hotline  [phone]  [facility]  [phone]      Further information, barriers, or follow up this writer has addressed: N/A

## 2023-08-28 NOTE — DISCHARGE SUMMARY
Minh, Martin, APRN CNP  Nurse Practitioner  Psychiatry       Signed     Date of Service: 8/18/2023 11:58 AM  Creation Time: 8/18/2023 12:12 PM       [facility] PSYCHIATRY  DISCHARGE SUMMARY      DISCHARGE DATA      Joseph Stephens MRN# [id number]   Age: 27 year old YOB: 1995      Date of Admission: 8/5/2023  Date of Discharge: August 18, 2023  Discharge Provider: GORGE Antonio CNP         REASON FOR ADMISSION      psychosis         DISCHARGE DIAGNOSES      1. Schizoaffective disorder, bipolar type, multiple episodes, in acute episode  2. Cannabis use disorder, severe  3. Stimulant (meth) use disorder, unspecified         CONSULTS            CLINICAL NUTRITION SERVICES  -  REASSESSMENT NOTE     Joseph Stephens :      27 yom admitted for psychosis. Medical/psych hx includes tobacco abuse, prediabetes (A1C 5.9 on 8/5/23), amphetamine use disorder, depressive disorder, bipolar 1 disorder, schizoaffective disorder. No new weight to assess. Good appetite, adequate intake.     Diet Order: Regular, double portions  Intake: 16 meals with 100% intake     Height: Data Unavailable  Weight: 130 lbs 6.4 oz  Body mass index is 19.83 kg/m .  Weight Status:  Normal BMI  Weight History: 10% loss in 5 months, 13% loss in 7 months         Wt Readings from Last 10 Encounters:   08/05/23 59.1 kg (130 lb 6.4 oz)   06/07/23 61.2 kg (134 lb 14.7 oz)   04/15/23 72.6 kg (160 lb)* [facility]   01/11/23 68.3 kg (150 lb 9.5 oz)* [facility]   11/12/22 69 kg (152 lb 3.2 oz)* [facility]   06/15/22 61.2 kg (135 lb)   08/21/21 58.8 kg (129 lb 11.2 oz)* [facility]   08/11/21 61.2 kg (135 lb)   03/19/18 65.3 kg (144 lb)   02/01/18 61.7 kg (136 lb)   01/25/18 63.5 kg (140 lb)      Weight used to calculate needs: 59.1kg - current weight  Estimated Energy Needs: 7496-0614 kcals (30-35 Kcal/Kg)   Estimated Protein Needs: 60-70 grams protein (1-1.2 g pro/Kg)     Malnutrition Diagnosis: Severe malnutrition  In  Context of:  Chronic illness or disease, Environmental or social circumstances     NUTRITION DIAGNOSIS:  Malnutrition related to inadequate energy/protein intake as evidenced by 13% weight loss in 7 months     NUTRITION RECOMMENDATIONS  - Encourage intake during meal times as needed  - Monitor weight                   HOSPITAL COURSE      Legal status: Orders Placed This Encounter      Voluntary     Patient was admitted to unit 5 due to the aforementioned presentation. The patient was placed under 15 minute checks to ensure patient safety. The patient participated in unit programming and groups as able.     Mr. Stephens did not require seclusion/restraint during hospitalization.      We reviewed with Mr. Stephens current and past medication trials including duration, dose, response and side effects. During this hospitalization, the following changes to the patient's psychotropic medications were made:     PTA psychotropic medications stopped:      -Previously on Zyprexa 10 mg BID, Trazodone 100 mg at bedtime, and Strattera 60 mg daily not being resumed given not taking and patient not interested.     PTA psychotropic medications continued/changed:      -none     New psychotropic medications tried and stopped:      -none     New psychotropic medications initiated:      - Haldol 15 mg  PO qhs  - Depakote  ER 1,250 mg PO qhs  - Cogentin 1 mg TID     Joseph Stephens was admitted to [facility] for psychosis.He was experiencing verbal agitation, anxiety and was nonsensical in conversation. Complicating his presentation was his history of substance use, primarily marijuana. It appears that Joseph has been off his psychotropic medications for 3.5 years. It appears he was most recent treatment was with Zyprexa, Trazodone and Strattera. He was not interested in initiating this medication, but was amenable to starting Haldol and Depakote, which appears he has taken historically. He was started  at 5 mg PO BID and titrated to 15 mg PO at bedtime at discharge. He did report some leg stiffness/akathisia, so Cogentin was imitated at 1 mg BID and titrated to 1 mg TID as we titrated him to his discharge dose of Haldol. The plan was discussed to transition to Haldol Decanoate, but ultimately Joseph decided to wait unti he could establish outpatient care for ease of transition. Depakote ER was started at 1000 mg at bedtime and titrated to 1250 mg at discharge. His VPA level at Depakote ER 1250 mg PO was 82.3 micrograms/mL on 8/12/23. Would monitor with changes in dosage or condition. Also, his HgbA1C was 5.9% with a serum glucose of 142 mg/dL on 8/5/23, subsequently placed on Metformin 500 mg daily with supper, which was continued at discharge. Along the course of his treatment, Joseph's psychosis improved with decreased intensity. Although it appears he has dystonia and akathisia, which he acknowledged and would like to continue with Haldol and Cogentin, which has responded well during his stay. He was able to participate in his discharge planning. He also denied SI, HI or SIB. He did not display delusions. He had a small degree of suspicion regarding staff but was not bothersome per Joseph. He verbalized he was prepared for discharge and is aware of his telehealth appointment 8/21/23 with [facility] for medication management. Joseph was discharged to [facility] per patient request.      With these changes and supports the patient noticed improvement in their symptoms and felt sufficiently ready for discharge. As a result, Joseph Stephens was discharged. At the time of discharge, Joseph Stephens was determined to not be a danger to self or others. The patient was also medically stable for discharge. At the current time of discharge, the patient does not meet criteria for involuntary hospitalization. On the day of discharge, the patient reports that they do not have  suicidal or homicidal ideation. Steps taken to minimize risk include: assessing patient s behavior and thought process daily during hospital stay, discharging patient with adequate plan for follow up for mental and physical health and discussing safety plan of returning to the hospital should the patient ever have thoughts of harming themselves or others. Therefore, based on all available evidence including the factors cited above, the patient does not appear to be at imminent risk for self-harm, and is appropriate for outpatient level of care. However, if patient uses substances or is medication non-adherent, their risk of decompensation and SI will be elevated. This was discussed with the patient.         DISCHARGE MEDICATIONS      Discharge Medication List as of 8/18/2023 11:16 AM              START taking these medications     Details   benztropine (COGENTIN) 1 MG tablet Take 1 tablet (1 mg) by mouth 3 times daily, Disp-90 tablet, R-0, E-Prescribe       metFORMIN (GLUCOPHAGE) 500 MG tablet Take 1 tablet (500 mg) by mouth daily (with dinner), Disp-30 tablet, R-0, E-Prescribe                  CONTINUE these medications which have CHANGED     Details   !! divalproex sodium extended-release (DEPAKOTE ER) 250 MG 24 hr tablet Take 1 tablet (250 mg) by mouth At Bedtime In addition to Depakote ER 1000 mg for total daily dose of 1250 mg, Disp-30 tablet, R-0, E-Prescribe       !! divalproex sodium extended-release (DEPAKOTE ER) 500 MG 24 hr tablet Take 2 tablets (1,000 mg) by mouth At Bedtime Plus one 250 mg tablet for total dose of 1250 mg, Disp-60 tablet, R-0, E-Prescribe       haloperidol (HALDOL) 5 MG tablet Take 3 tablets (15 mg) by mouth At Bedtime, Disp-90 tablet, R-0, E-Prescribe        !! - Potential duplicate medications found. Please discuss with provider.              CONTINUE these medications which have NOT CHANGED     Details   hydrOXYzine (VISTARIL) 25 MG capsule Take 50 mg by mouth 3 times daily as needed  "for anxiety, Historical                   STOP taking these medications         atomoxetine (STRATTERA) 60 MG capsule Comments:   Reason for Stopping:            OLANZapine (ZYPREXA) 10 MG tablet Comments:   Reason for Stopping:            traZODone (DESYREL) 100 MG tablet Comments:   Reason for Stopping:                           MENTAL STATUS EXAM   Vitals: /78   Pulse 67   Temp 97.1  F (36.2  C) (Tympanic)   Resp 16   Wt 59.1 kg (130 lb 6.4 oz)   SpO2 98%   BMI 19.83 kg/m       Appearance: Alert, oriented, dressed in hospital scrubs, appears stated age   Attitude: somewhat cooperative  Eye Contact: intense  Mood: \"I'm fine\"  Affect: slightly irritable  Speech: Normal rate and rhythm   Psychomotor Behavior: No tremor, rigidity, or psychomotor abnormality   Thought Process: Logical, goal directed   Associations: No loose associations   Thought Content: Denies SI or plan. No SIB. Denies A/V hallucinations. No evidence of delusional thought.  Insight: Good  Judgment: Good  Oriented to: Person, place, and time  Attention Span and Concentration: Intact  Recent and Remote Memory: Intact  Language: English with appropriate syntax and vocabulary  Fund of Knowledge: Average  Muscle Strength and Tone: Grossly normal  Gait and Station: Grossly normal         DISCHARGE PLAN      1.  Education given regarding diagnostic and treatment options with risks, benefits and alternatives with adequate verbalization of understanding.  2.  Discharge to homelessness. Upon detailed review of risk factors, patient amenable for release.   3.  Continue aforementioned medications and associated medication changes with follow-up by outpatient provider.  4.  Crisis management planning in place.    5.  Nursing and  to review further discharge recommendations.   6.  Patient is being discharged with the following appointments as detailed below.     [facility]  [address], [address]  [place], [place] " [address]  [phone]  Psychiatry: Nitin Merino- August 21st 5:55pm- Teleahealth            DISCHARGE SERVICES PROVIDED      40 minutes spent on discharge services, including:  Final examination of patient.  Review and discussion of hospital stay.  Instructions for continued outpatient care/goals.  Preparation of discharge records.  Preparation of medications refills and new prescriptions.  Preparation of applicable referral forms.          ATTESTATION      GORGE Antonio CNP         LABS THIS ADMISSION            Results for orders placed or performed during the hospital encounter of 08/05/23   Ethyl Alcohol Level     Status: Normal   Result Value Ref Range     Alcohol ethyl <0.01 <=0.01 g/dL   Basic metabolic panel     Status: Abnormal   Result Value Ref Range     Sodium 136 136 - 145 mmol/L     Potassium 3.5 3.4 - 5.3 mmol/L     Chloride 101 98 - 107 mmol/L     Carbon Dioxide (CO2) 21 (L) 22 - 29 mmol/L     Anion Gap 14 7 - 15 mmol/L     Urea Nitrogen 5.4 (L) 6.0 - 20.0 mg/dL     Creatinine 0.75 0.67 - 1.17 mg/dL     Calcium 9.2 8.6 - 10.0 mg/dL     Glucose 142 (H) 70 - 99 mg/dL     GFR Estimate >90 >60 mL/min/1.73m2   Urine Drugs of Abuse Screen Panel 13     Status: Abnormal   Result Value Ref Range     Cannabinoids (51-orf-3-carboxy-9-THC) Detected (A) Not Detected, Indeterminate     Phencyclidine Not Detected Not Detected, Indeterminate     Cocaine (Benzoylecgonine) Not Detected Not Detected, Indeterminate     Methamphetamine (d-Methamphetamine) Not Detected Not Detected, Indeterminate     Opiates (Morphine) Not Detected Not Detected, Indeterminate     Amphetamine (d-Amphetamine) Not Detected Not Detected, Indeterminate     Benzodiazepines (Nordiazepam) Not Detected Not Detected, Indeterminate     Tricyclic Antidepressants (Desipramine) Not Detected Not Detected, Indeterminate     Methadone Not Detected Not Detected, Indeterminate     Barbiturates (Butalbital) Not Detected Not Detected, Indeterminate      Oxycodone Not Detected Not Detected, Indeterminate     Propoxyphene (Norpropoxyphene) Not Detected Not Detected, Indeterminate     Buprenorphine Not Detected Not Detected, Indeterminate   Asymptomatic COVID-19 Virus (Coronavirus) by PCR Nasopharyngeal     Status: Normal     Specimen: Nasopharyngeal; Swab   Result Value Ref Range     SARS CoV2 PCR Negative Negative     Narrative     Testing was performed using the Xpert Xpress SARS-CoV-2 Assay on the Cepheid Gene-Xpert Instrument Systems. Additional information about this Emergency Use Authorization (EUA) assay can be found via the Lab Guide. This test should be ordered for the detection of SARS-CoV-2 in individuals who meet SARS-CoV-2 clinical and/or epidemiological criteria as well as from individuals without symptoms or other reasons to suspect COVID-19. Test performance for asymptomatic patients has only been established in anterior nasal swab specimens. This test is for in vitro diagnostic use under the FDA EUA for laboratories certified under CLIA to perform high complexity testing. This test has not been FDA cleared or approved. A negative result does not rule out the presence of PCR inhibitors in the specimen or target RNA concentration below the limit of detection for the assay. The possibility of a false negative should be considered if the patient's recent exposure or clinical presentation suggests COVID-19. This test was validated by [facility] laboratory. This laboratory is certified under the Clinical Laboratory Improvement Amendments (CLIA) as qualified to perform high complexity testing.   CBC with platelets and differential     Status: None   Result Value Ref Range     WBC Count 9.0 4.0 - 11.0 10e3/uL     RBC Count 5.26 4.40 - 5.90 10e6/uL     Hemoglobin 15.2 13.3 - 17.7 g/dL     Hematocrit 44.0 40.0 - 53.0 %     MCV 84 78 - 100 fL     MCH 28.9 26.5 - 33.0 pg     MCHC 34.5 31.5 - 36.5 g/dL     RDW 12.4 10.0 - 15.0 %     Platelet  Count 195 150 - 450 10e3/uL     % Neutrophils 48 %     % Lymphocytes 45 %     % Monocytes 6 %     % Eosinophils 0 %     % Basophils 1 %     % Immature Granulocytes 0 %     NRBCs per 100 WBC 0 <1 /100     Absolute Neutrophils 4.3 1.6 - 8.3 10e3/uL     Absolute Lymphocytes 4.1 0.8 - 5.3 10e3/uL     Absolute Monocytes 0.5 0.0 - 1.3 10e3/uL     Absolute Eosinophils 0.0 0.0 - 0.7 10e3/uL     Absolute Basophils 0.1 0.0 - 0.2 10e3/uL     Absolute Immature Granulocytes 0.0 <=0.4 10e3/uL     Absolute NRBCs 0.0 10e3/uL   Hepatic panel     Status: Normal   Result Value Ref Range     Protein Total 7.5 6.4 - 8.3 g/dL     Albumin 4.7 3.5 - 5.2 g/dL     Bilirubin Total 0.4 <=1.2 mg/dL     Alkaline Phosphatase 68 40 - 129 U/L     AST 16 0 - 45 U/L     ALT 11 0 - 70 U/L     Bilirubin Direct <0.20 0.00 - 0.30 mg/dL   Hemoglobin A1c     Status: Abnormal   Result Value Ref Range     Estimated Average Glucose 123 mg/dL     Hemoglobin A1C 5.9 (H) <5.7 %   Lipid Profile     Status: Abnormal   Result Value Ref Range     Cholesterol 87 <200 mg/dL     Triglycerides 89 <150 mg/dL     Direct Measure HDL 30 (L) >=40 mg/dL     LDL Cholesterol Calculated 39 <=100 mg/dL     Non HDL Cholesterol 57 <130 mg/dL     Narrative     Cholesterol  Desirable:  <200 mg/dL     Triglycerides  Normal:  Less than 150 mg/dL  Borderline High:  150-199 mg/dL  High:  200-499 mg/dL  Very High:  Greater than or equal to 500 mg/dL     Direct Measure HDL  Female:  Greater than or equal to 50 mg/dL   Male:  Greater than or equal to 40 mg/dL     LDL Cholesterol  Desirable:  <100mg/dL  Above Desirable:  100-129 mg/dL   Borderline High:  130-159 mg/dL   High:  160-189 mg/dL   Very High:  >= 190 mg/dL     Non HDL Cholesterol  Desirable:  130 mg/dL  Above Desirable:  130-159 mg/dL  Borderline High:  160-189 mg/dL  High:  190-219 mg/dL  Very High:  Greater than or equal to 220 mg/dL   Valproic acid     Status: Normal   Result Value Ref Range     Valproic acid 82.3   ug/mL    Comprehensive metabolic panel     Status: Normal   Result Value Ref Range     Sodium 136 136 - 145 mmol/L     Potassium 4.6 3.4 - 5.3 mmol/L     Chloride 100 98 - 107 mmol/L     Carbon Dioxide (CO2) 25 22 - 29 mmol/L     Anion Gap 11 7 - 15 mmol/L     Urea Nitrogen 16.2 6.0 - 20.0 mg/dL     Creatinine 0.76 0.67 - 1.17 mg/dL     Calcium 9.2 8.6 - 10.0 mg/dL     Glucose 84 70 - 99 mg/dL     Alkaline Phosphatase 52 40 - 129 U/L     AST 11 0 - 45 U/L     ALT 5 0 - 70 U/L     Protein Total 6.6 6.4 - 8.3 g/dL     Albumin 4.3 3.5 - 5.2 g/dL     Bilirubin Total 0.3 <=1.2 mg/dL     GFR Estimate >90 >60 mL/min/1.73m2   CBC with platelets differential     Status: None     Narrative     The following orders were created for panel order CBC with platelets differential.  Procedure                               Abnormality         Status                     ---------                               -----------         ------                     CBC with platelets and d...[[id number]]                      Final result                  Please view results for these tests on the individual orders.   Urine Drugs of Abuse Screen     Status: Abnormal     Narrative     The following orders were created for panel order Urine Drugs of Abuse Screen.  Procedure                               Abnormality         Status                     ---------                               -----------         ------                     Urine Drugs of Abuse Scr...[[id number]]  Abnormal            Final result                  Please view results for these tests on the individual orders.

## 2023-12-03 ENCOUNTER — HOSPITAL ENCOUNTER (EMERGENCY)
Facility: CLINIC | Age: 28
Discharge: HOME | End: 2023-12-03
Attending: EMERGENCY MEDICINE | Admitting: EMERGENCY MEDICINE

## 2023-12-03 VITALS
WEIGHT: 145 LBS | HEART RATE: 67 BPM | BODY MASS INDEX: 21.98 KG/M2 | DIASTOLIC BLOOD PRESSURE: 89 MMHG | SYSTOLIC BLOOD PRESSURE: 134 MMHG | TEMPERATURE: 97.2 F | RESPIRATION RATE: 16 BRPM | HEIGHT: 68 IN | OXYGEN SATURATION: 100 %
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
## 2023-12-03 PROCEDURE — 99282 EMERGENCY DEPT VISIT SF MDM: CPT

## 2023-12-03 PROCEDURE — 99283 EMERGENCY DEPT VISIT LOW MDM: CPT
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
## 2024-01-08 ENCOUNTER — APPOINTMENT (OUTPATIENT)
Dept: GENERAL RADIOLOGY | Facility: CLINIC | Age: 29
End: 2024-01-08
Attending: FAMILY MEDICINE
Payer: MEDICARE

## 2024-01-08 ENCOUNTER — HOSPITAL ENCOUNTER (EMERGENCY)
Facility: CLINIC | Age: 29
Discharge: HOME | End: 2024-01-09
Attending: FAMILY MEDICINE | Admitting: FAMILY MEDICINE
Payer: MEDICARE

## 2024-01-08 VITALS
HEART RATE: 95 BPM | SYSTOLIC BLOOD PRESSURE: 139 MMHG | TEMPERATURE: 99 F | DIASTOLIC BLOOD PRESSURE: 88 MMHG | RESPIRATION RATE: 18 BRPM | OXYGEN SATURATION: 98 %

## 2024-01-08 DIAGNOSIS — S93.601A RIGHT FOOT SPRAIN, INITIAL ENCOUNTER: ICD-10-CM

## 2024-01-08 PROCEDURE — 99283 EMERGENCY DEPT VISIT LOW MDM: CPT | Performed by: FAMILY MEDICINE

## 2024-01-08 PROCEDURE — 73630 X-RAY EXAM OF FOOT: CPT | Mod: RT

## 2024-01-08 ASSESSMENT — ACTIVITIES OF DAILY LIVING (ADL): ADLS_ACUITY_SCORE: 37

## 2024-01-09 NOTE — ED TRIAGE NOTES
Pt reports fall yesterday where he injured his right foot.  C/o discoloration, pain.  Reports hx of right foot injury and surgery x 4 years ago.      Triage Assessment (Adult)       Row Name 01/08/24 2247          Triage Assessment    Airway WDL WDL        Respiratory WDL    Respiratory WDL WDL        Cardiac WDL    Cardiac WDL WDL
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
## 2024-02-14 ENCOUNTER — HOSPITAL ENCOUNTER (EMERGENCY)
Facility: CLINIC | Age: 29
Discharge: HOME | End: 2024-02-14
Attending: FAMILY MEDICINE | Admitting: FAMILY MEDICINE
Payer: MEDICARE

## 2024-02-14 VITALS
SYSTOLIC BLOOD PRESSURE: 122 MMHG | OXYGEN SATURATION: 99 % | TEMPERATURE: 99.4 F | DIASTOLIC BLOOD PRESSURE: 72 MMHG | RESPIRATION RATE: 20 BRPM | BODY MASS INDEX: 22.05 KG/M2 | HEIGHT: 68 IN | HEART RATE: 80 BPM

## 2024-02-14 DIAGNOSIS — J02.0 STREP PHARYNGITIS: ICD-10-CM

## 2024-02-14 LAB
DEPRECATED S PYO AG THROAT QL EIA: POSITIVE
FLUAV RNA SPEC QL NAA+PROBE: NEGATIVE
FLUBV RNA RESP QL NAA+PROBE: NEGATIVE
RSV RNA SPEC NAA+PROBE: NEGATIVE
SARS-COV-2 RNA RESP QL NAA+PROBE: NEGATIVE

## 2024-02-14 PROCEDURE — 87880 STREP A ASSAY W/OPTIC: CPT | Performed by: FAMILY MEDICINE

## 2024-02-14 PROCEDURE — 99283 EMERGENCY DEPT VISIT LOW MDM: CPT | Performed by: FAMILY MEDICINE

## 2024-02-14 PROCEDURE — 87637 SARSCOV2&INF A&B&RSV AMP PRB: CPT | Performed by: FAMILY MEDICINE

## 2024-02-14 PROCEDURE — 87880 STREP A ASSAY W/OPTIC: CPT | Performed by: STUDENT IN AN ORGANIZED HEALTH CARE EDUCATION/TRAINING PROGRAM

## 2024-02-14 RX ORDER — AMOXICILLIN 875 MG
875 TABLET ORAL 2 TIMES DAILY
Qty: 20 TABLET | Refills: 0 | Status: SHIPPED | OUTPATIENT
Start: 2024-02-14 | End: 2024-02-24

## 2024-02-14 ASSESSMENT — ACTIVITIES OF DAILY LIVING (ADL): ADLS_ACUITY_SCORE: 37

## 2024-02-14 NOTE — Clinical Note
Joseph Stephens was seen and treated in our emergency department on 2/14/2024.  He may return to work on 02/15/2024.       If you have any questions or concerns, please don't hesitate to call.      Yves Das MD

## 2024-02-14 NOTE — Clinical Note
Joseph Stephens was seen and treated in our emergency department on 2/14/2024.  He may return to work on 02/14/2024.       If you have any questions or concerns, please don't hesitate to call.      Yves Das MD

## 2024-02-14 NOTE — ED TRIAGE NOTES
Not feeling well for several days/weeks.  Household had strep throat go thru it 3 weeks ago.  In with constant sore throat today and temp 99.8.     Triage Assessment (Adult)       Row Name 02/14/24 0636          Triage Assessment    Airway WDL WDL        Respiratory WDL    Respiratory WDL X;cough     Cough Frequency infrequent     Cough Type productive;congested        Skin Circulation/Temperature WDL    Skin Circulation/Temperature WDL WDL        Cardiac WDL    Cardiac WDL WDL        Peripheral/Neurovascular WDL    Peripheral Neurovascular WDL WDL        Cognitive/Neuro/Behavioral WDL    Cognitive/Neuro/Behavioral WDL WDL
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
## 2024-05-20 ENCOUNTER — HOSPITAL ENCOUNTER (EMERGENCY)
Facility: CLINIC | Age: 29
Discharge: HOME | End: 2024-05-20
Attending: PHYSICIAN ASSISTANT | Admitting: PHYSICIAN ASSISTANT
Payer: MEDICARE

## 2024-05-20 VITALS
BODY MASS INDEX: 22.35 KG/M2 | WEIGHT: 147 LBS | OXYGEN SATURATION: 100 % | HEART RATE: 57 BPM | RESPIRATION RATE: 18 BRPM | TEMPERATURE: 98.2 F | DIASTOLIC BLOOD PRESSURE: 92 MMHG | SYSTOLIC BLOOD PRESSURE: 124 MMHG

## 2024-05-20 DIAGNOSIS — L08.9 SUPERFICIAL INJURY OF LEFT MIDDLE FINGER WITH INFECTION: ICD-10-CM

## 2024-05-20 DIAGNOSIS — S60.943A SUPERFICIAL INJURY OF LEFT MIDDLE FINGER WITH INFECTION: ICD-10-CM

## 2024-05-20 PROCEDURE — 10060 I&D ABSCESS SIMPLE/SINGLE: CPT | Mod: F2 | Performed by: PHYSICIAN ASSISTANT

## 2024-05-20 PROCEDURE — 99283 EMERGENCY DEPT VISIT LOW MDM: CPT | Mod: 25 | Performed by: PHYSICIAN ASSISTANT

## 2024-05-20 PROCEDURE — 250N000011 HC RX IP 250 OP 636: Performed by: PHYSICIAN ASSISTANT

## 2024-05-20 PROCEDURE — 90471 IMMUNIZATION ADMIN: CPT | Performed by: PHYSICIAN ASSISTANT

## 2024-05-20 PROCEDURE — 90715 TDAP VACCINE 7 YRS/> IM: CPT | Performed by: PHYSICIAN ASSISTANT

## 2024-05-20 RX ORDER — CEPHALEXIN 500 MG/1
500 CAPSULE ORAL 4 TIMES DAILY
Qty: 30 CAPSULE | Refills: 0 | Status: SHIPPED | OUTPATIENT
Start: 2024-05-20 | End: 2024-08-21

## 2024-05-20 RX ADMIN — CLOSTRIDIUM TETANI TOXOID ANTIGEN (FORMALDEHYDE INACTIVATED), CORYNEBACTERIUM DIPHTHERIAE TOXOID ANTIGEN (FORMALDEHYDE INACTIVATED), BORDETELLA PERTUSSIS TOXOID ANTIGEN (GLUTARALDEHYDE INACTIVATED), BORDETELLA PERTUSSIS FILAMENTOUS HEMAGGLUTININ ANTIGEN (FORMALDEHYDE INACTIVATED), BORDETELLA PERTUSSIS PERTACTIN ANTIGEN, AND BORDETELLA PERTUSSIS FIMBRIAE 2/3 ANTIGEN 0.5 ML: 5; 2; 2.5; 5; 3; 5 INJECTION, SUSPENSION INTRAMUSCULAR at 22:54

## 2024-05-20 ASSESSMENT — ACTIVITIES OF DAILY LIVING (ADL)
ADLS_ACUITY_SCORE: 35
ADLS_ACUITY_SCORE: 35

## 2024-05-20 ASSESSMENT — COLUMBIA-SUICIDE SEVERITY RATING SCALE - C-SSRS
2. HAVE YOU ACTUALLY HAD ANY THOUGHTS OF KILLING YOURSELF IN THE PAST MONTH?: NO
1. IN THE PAST MONTH, HAVE YOU WISHED YOU WERE DEAD OR WISHED YOU COULD GO TO SLEEP AND NOT WAKE UP?: NO
6. HAVE YOU EVER DONE ANYTHING, STARTED TO DO ANYTHING, OR PREPARED TO DO ANYTHING TO END YOUR LIFE?: NO

## 2024-05-21 NOTE — ED TRIAGE NOTES
Patient cut his finger with aluminum this past Thursday. In tonight with increased pain.     Triage Assessment (Adult)       Row Name 05/20/24 2053          Triage Assessment    Airway WDL WDL        Respiratory WDL    Respiratory WDL WDL        Cardiac WDL    Cardiac WDL WDL

## 2024-05-21 NOTE — DISCHARGE INSTRUCTIONS
Please take the antibiotics as prescribed to treat this infection.  Keep the wound covered with antibiotic ointment and a bandage.  If you have any worsening concerns please return to the emergency department.    Thank you for choosing [facility]'s Emergency Department. It was a pleasure taking care of you today. If you have any questions, please call [phone].    Rowan Israel PA-C
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
## 2024-06-27 ENCOUNTER — HOSPITAL ENCOUNTER (EMERGENCY)
Facility: CLINIC | Age: 29
Discharge: HOME | End: 2024-06-28
Attending: STUDENT IN AN ORGANIZED HEALTH CARE EDUCATION/TRAINING PROGRAM | Admitting: STUDENT IN AN ORGANIZED HEALTH CARE EDUCATION/TRAINING PROGRAM
Payer: MEDICARE

## 2024-06-27 VITALS
TEMPERATURE: 98 F | OXYGEN SATURATION: 99 % | HEART RATE: 86 BPM | RESPIRATION RATE: 18 BRPM | DIASTOLIC BLOOD PRESSURE: 101 MMHG | SYSTOLIC BLOOD PRESSURE: 149 MMHG

## 2024-06-27 DIAGNOSIS — T43.505D: ICD-10-CM

## 2024-06-27 DIAGNOSIS — F25.0 SCHIZOAFFECTIVE DISORDER, BIPOLAR TYPE (H): ICD-10-CM

## 2024-06-27 DIAGNOSIS — F41.9 ANXIETY: ICD-10-CM

## 2024-06-27 PROCEDURE — 99283 EMERGENCY DEPT VISIT LOW MDM: CPT | Performed by: STUDENT IN AN ORGANIZED HEALTH CARE EDUCATION/TRAINING PROGRAM

## 2024-06-27 ASSESSMENT — COLUMBIA-SUICIDE SEVERITY RATING SCALE - C-SSRS
1. IN THE PAST MONTH, HAVE YOU WISHED YOU WERE DEAD OR WISHED YOU COULD GO TO SLEEP AND NOT WAKE UP?: NO
2. HAVE YOU ACTUALLY HAD ANY THOUGHTS OF KILLING YOURSELF IN THE PAST MONTH?: NO
6. HAVE YOU EVER DONE ANYTHING, STARTED TO DO ANYTHING, OR PREPARED TO DO ANYTHING TO END YOUR LIFE?: NO

## 2024-06-28 RX ORDER — DIVALPROEX SODIUM 500 MG/1
1000 TABLET, EXTENDED RELEASE ORAL AT BEDTIME
Qty: 60 TABLET | Refills: 0 | Status: SHIPPED | OUTPATIENT
Start: 2024-06-28 | End: 2024-09-10

## 2024-06-28 RX ORDER — HALOPERIDOL 5 MG/1
15 TABLET ORAL AT BEDTIME
Qty: 90 TABLET | Refills: 0 | Status: SHIPPED | OUTPATIENT
Start: 2024-06-28 | End: 2024-09-10

## 2024-06-28 RX ORDER — DIVALPROEX SODIUM 250 MG/1
250 TABLET, EXTENDED RELEASE ORAL AT BEDTIME
Qty: 30 TABLET | Refills: 0 | Status: SHIPPED | OUTPATIENT
Start: 2024-06-28 | End: 2024-09-10

## 2024-06-28 RX ORDER — BENZTROPINE MESYLATE 1 MG/1
1 TABLET ORAL 3 TIMES DAILY
Qty: 90 TABLET | Refills: 0 | Status: SHIPPED | OUTPATIENT
Start: 2024-06-28 | End: 2024-09-10

## 2024-06-28 RX ORDER — HYDROXYZINE HYDROCHLORIDE 25 MG/1
25 TABLET, FILM COATED ORAL 3 TIMES DAILY PRN
Qty: 30 TABLET | Refills: 0 | Status: SHIPPED | OUTPATIENT
Start: 2024-06-28 | End: 2024-09-10

## 2024-06-28 ASSESSMENT — ENCOUNTER SYMPTOMS
NERVOUS/ANXIOUS: 1
AGITATION: 1
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
## 2024-06-28 NOTE — ED TRIAGE NOTES
"Pt states both feet are hurting and he is coming to ED for \"mental health\" referring to anxiety. Pt states he doesn't believe in depression or SI. Pt states 'he doesn't really feel safe anywhere'.      Triage Assessment (Adult)       Row Name 06/27/24 0221          Triage Assessment    Airway WDL WDL        Respiratory WDL    Respiratory WDL WDL        Cardiac WDL    Cardiac WDL WDL                     "

## 2024-06-28 NOTE — DISCHARGE INSTRUCTIONS
Take your medication as prescribed.  Please follow-up with your primary team as needed over the next few days.  If you start have any suicidal thoughts or homicidal thoughts please return for reevaluation emergently.  Please call [phone] if you need any further help.  Please start taking medications as prescribed.

## 2024-08-04 ENCOUNTER — HOSPITAL ENCOUNTER (EMERGENCY)
Facility: CLINIC | Age: 29
Discharge: HOME | End: 2024-08-04
Attending: NURSE PRACTITIONER | Admitting: NURSE PRACTITIONER
Payer: MEDICARE

## 2024-08-04 VITALS
HEART RATE: 103 BPM | TEMPERATURE: 100 F | OXYGEN SATURATION: 100 % | DIASTOLIC BLOOD PRESSURE: 118 MMHG | RESPIRATION RATE: 18 BRPM | SYSTOLIC BLOOD PRESSURE: 161 MMHG

## 2024-08-04 DIAGNOSIS — R09.81 NASAL CONGESTION: ICD-10-CM

## 2024-08-04 DIAGNOSIS — F41.9 ANXIETY: ICD-10-CM

## 2024-08-04 DIAGNOSIS — R25.1 SHAKINESS: ICD-10-CM

## 2024-08-04 LAB
ALBUMIN SERPL BCG-MCNC: 4.5 G/DL (ref 3.5–5.2)
ALBUMIN UR-MCNC: NEGATIVE MG/DL
ALP SERPL-CCNC: 61 U/L (ref 40–150)
ALT SERPL W P-5'-P-CCNC: 12 U/L (ref 0–70)
AMPHETAMINES UR QL SCN: ABNORMAL
ANION GAP SERPL CALCULATED.3IONS-SCNC: 12 MMOL/L (ref 7–15)
APPEARANCE UR: CLEAR
AST SERPL W P-5'-P-CCNC: 14 U/L (ref 0–45)
BARBITURATES UR QL SCN: ABNORMAL
BASOPHILS # BLD AUTO: 0.1 10E3/UL (ref 0–0.2)
BASOPHILS NFR BLD AUTO: 1 %
BENZODIAZ UR QL SCN: ABNORMAL
BILIRUB SERPL-MCNC: 0.2 MG/DL
BILIRUB UR QL STRIP: NEGATIVE
BUN SERPL-MCNC: 4.4 MG/DL (ref 6–20)
BZE UR QL SCN: ABNORMAL
CALCIUM SERPL-MCNC: 9.4 MG/DL (ref 8.8–10.4)
CANNABINOIDS UR QL SCN: ABNORMAL
CHLORIDE SERPL-SCNC: 100 MMOL/L (ref 98–107)
COLOR UR AUTO: YELLOW
CREAT SERPL-MCNC: 0.68 MG/DL (ref 0.67–1.17)
DEPRECATED S PYO AG THROAT QL EIA: NEGATIVE
EGFRCR SERPLBLD CKD-EPI 2021: >90 ML/MIN/1.73M2
EOSINOPHIL # BLD AUTO: 0 10E3/UL (ref 0–0.7)
EOSINOPHIL NFR BLD AUTO: 0 %
ERYTHROCYTE [DISTWIDTH] IN BLOOD BY AUTOMATED COUNT: 12.5 % (ref 10–15)
FENTANYL UR QL: ABNORMAL
FLUAV RNA SPEC QL NAA+PROBE: NEGATIVE
FLUBV RNA RESP QL NAA+PROBE: NEGATIVE
GLUCOSE SERPL-MCNC: 120 MG/DL (ref 70–99)
GLUCOSE UR STRIP-MCNC: NEGATIVE MG/DL
HCO3 SERPL-SCNC: 24 MMOL/L (ref 22–29)
HCT VFR BLD AUTO: 40 % (ref 40–53)
HGB BLD-MCNC: 14.1 G/DL (ref 13.3–17.7)
HGB UR QL STRIP: NEGATIVE
IMM GRANULOCYTES # BLD: 0 10E3/UL
IMM GRANULOCYTES NFR BLD: 0 %
KETONES UR STRIP-MCNC: NEGATIVE MG/DL
LEUKOCYTE ESTERASE UR QL STRIP: NEGATIVE
LYMPHOCYTES # BLD AUTO: 2.7 10E3/UL (ref 0.8–5.3)
LYMPHOCYTES NFR BLD AUTO: 31 %
MCH RBC QN AUTO: 29 PG (ref 26.5–33)
MCHC RBC AUTO-ENTMCNC: 35.3 G/DL (ref 31.5–36.5)
MCV RBC AUTO: 82 FL (ref 78–100)
MONOCYTES # BLD AUTO: 0.8 10E3/UL (ref 0–1.3)
MONOCYTES NFR BLD AUTO: 9 %
MUCOUS THREADS #/AREA URNS LPF: PRESENT /LPF
NEUTROPHILS # BLD AUTO: 5.2 10E3/UL (ref 1.6–8.3)
NEUTROPHILS NFR BLD AUTO: 59 %
NITRATE UR QL: NEGATIVE
NRBC # BLD AUTO: 0 10E3/UL
NRBC BLD AUTO-RTO: 0 /100
OPIATES UR QL SCN: ABNORMAL
PCP QUAL URINE (ROCHE): ABNORMAL
PH UR STRIP: 6 [PH] (ref 5–7)
PLATELET # BLD AUTO: 237 10E3/UL (ref 150–450)
POTASSIUM SERPL-SCNC: 3.8 MMOL/L (ref 3.4–5.3)
PROT SERPL-MCNC: 7.6 G/DL (ref 6.4–8.3)
RBC # BLD AUTO: 4.86 10E6/UL (ref 4.4–5.9)
RBC URINE: <1 /HPF
RSV RNA SPEC NAA+PROBE: NEGATIVE
SARS-COV-2 RNA RESP QL NAA+PROBE: NEGATIVE
SODIUM SERPL-SCNC: 136 MMOL/L (ref 135–145)
SP GR UR STRIP: 1.01 (ref 1–1.03)
SPERM #/AREA URNS HPF: PRESENT /HPF
UROBILINOGEN UR STRIP-MCNC: NORMAL MG/DL
WBC # BLD AUTO: 8.9 10E3/UL (ref 4–11)
WBC URINE: 7 /HPF

## 2024-08-04 PROCEDURE — 99283 EMERGENCY DEPT VISIT LOW MDM: CPT | Performed by: NURSE PRACTITIONER

## 2024-08-04 PROCEDURE — 87637 SARSCOV2&INF A&B&RSV AMP PRB: CPT | Performed by: NURSE PRACTITIONER

## 2024-08-04 PROCEDURE — 80307 DRUG TEST PRSMV CHEM ANLYZR: CPT | Performed by: NURSE PRACTITIONER

## 2024-08-04 PROCEDURE — 85048 AUTOMATED LEUKOCYTE COUNT: CPT | Performed by: NURSE PRACTITIONER

## 2024-08-04 PROCEDURE — 81001 URINALYSIS AUTO W/SCOPE: CPT | Mod: XU | Performed by: NURSE PRACTITIONER

## 2024-08-04 PROCEDURE — 87651 STREP A DNA AMP PROBE: CPT | Performed by: NURSE PRACTITIONER

## 2024-08-04 PROCEDURE — 36415 COLL VENOUS BLD VENIPUNCTURE: CPT | Performed by: NURSE PRACTITIONER

## 2024-08-04 PROCEDURE — 80053 COMPREHEN METABOLIC PANEL: CPT | Performed by: NURSE PRACTITIONER

## 2024-08-04 ASSESSMENT — ACTIVITIES OF DAILY LIVING (ADL)
ADLS_ACUITY_SCORE: 37
ADLS_ACUITY_SCORE: 37
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
## 2024-08-04 NOTE — ED TRIAGE NOTES
"Patient reports \"uncontrollable breathing\", \"trembling\" and pain to bilateral legs \"above knees\" rating 2/10. States he smoked THC for the pain and that helped.      Triage Assessment (Adult)       Row Name 08/04/24 8487          Triage Assessment    Airway WDL WDL        Respiratory WDL    Respiratory WDL WDL        Skin Circulation/Temperature WDL    Skin Circulation/Temperature WDL WDL                     "

## 2024-08-05 LAB — GROUP A STREP BY PCR: NOT DETECTED

## 2024-08-05 NOTE — DISCHARGE INSTRUCTIONS
No concerning lab findings.  This could be a start of a virus such as a cold.  Be sure to take your medications as prescribed.

## 2024-08-06 ENCOUNTER — HOSPITAL ENCOUNTER (EMERGENCY)
Facility: CLINIC | Age: 29
Discharge: HOME | End: 2024-08-06
Attending: FAMILY MEDICINE | Admitting: FAMILY MEDICINE
Payer: MEDICARE

## 2024-08-06 VITALS
HEART RATE: 95 BPM | DIASTOLIC BLOOD PRESSURE: 97 MMHG | RESPIRATION RATE: 16 BRPM | OXYGEN SATURATION: 98 % | SYSTOLIC BLOOD PRESSURE: 138 MMHG | TEMPERATURE: 97.8 F

## 2024-08-06 DIAGNOSIS — G47.9 SLEEPING DIFFICULTY: ICD-10-CM

## 2024-08-06 DIAGNOSIS — M79.18 PAIN OF THIGH MUSCLE: ICD-10-CM

## 2024-08-06 LAB
CK SERPL-CCNC: 138 U/L (ref 39–308)
HOLD SPECIMEN: NORMAL

## 2024-08-06 PROCEDURE — 250N000013 HC RX MED GY IP 250 OP 250 PS 637: Performed by: FAMILY MEDICINE

## 2024-08-06 PROCEDURE — 250N000011 HC RX IP 250 OP 636: Performed by: FAMILY MEDICINE

## 2024-08-06 PROCEDURE — 99284 EMERGENCY DEPT VISIT MOD MDM: CPT | Performed by: FAMILY MEDICINE

## 2024-08-06 PROCEDURE — 82550 ASSAY OF CK (CPK): CPT | Performed by: FAMILY MEDICINE

## 2024-08-06 PROCEDURE — 36415 COLL VENOUS BLD VENIPUNCTURE: CPT | Performed by: FAMILY MEDICINE

## 2024-08-06 PROCEDURE — 96374 THER/PROPH/DIAG INJ IV PUSH: CPT | Performed by: FAMILY MEDICINE

## 2024-08-06 PROCEDURE — 99284 EMERGENCY DEPT VISIT MOD MDM: CPT | Mod: 25 | Performed by: FAMILY MEDICINE

## 2024-08-06 RX ORDER — CYCLOBENZAPRINE HCL 10 MG
10 TABLET ORAL ONCE
Status: COMPLETED | OUTPATIENT
Start: 2024-08-06 | End: 2024-08-06

## 2024-08-06 RX ORDER — KETOROLAC TROMETHAMINE 30 MG/ML
30 INJECTION, SOLUTION INTRAMUSCULAR; INTRAVENOUS ONCE
Status: COMPLETED | OUTPATIENT
Start: 2024-08-06 | End: 2024-08-06

## 2024-08-06 RX ADMIN — CYCLOBENZAPRINE 10 MG: 10 TABLET, FILM COATED ORAL at 23:11

## 2024-08-06 RX ADMIN — KETOROLAC TROMETHAMINE 30 MG: 30 INJECTION, SOLUTION INTRAMUSCULAR at 22:02

## 2024-08-06 ASSESSMENT — ACTIVITIES OF DAILY LIVING (ADL)
ADLS_ACUITY_SCORE: 37
ADLS_ACUITY_SCORE: 37

## 2024-08-06 ASSESSMENT — COLUMBIA-SUICIDE SEVERITY RATING SCALE - C-SSRS
1. IN THE PAST MONTH, HAVE YOU WISHED YOU WERE DEAD OR WISHED YOU COULD GO TO SLEEP AND NOT WAKE UP?: NO
6. HAVE YOU EVER DONE ANYTHING, STARTED TO DO ANYTHING, OR PREPARED TO DO ANYTHING TO END YOUR LIFE?: NO
2. HAVE YOU ACTUALLY HAD ANY THOUGHTS OF KILLING YOURSELF IN THE PAST MONTH?: NO

## 2024-08-06 ASSESSMENT — ENCOUNTER SYMPTOMS: FEVER: 0

## 2024-08-07 ENCOUNTER — TELEPHONE (OUTPATIENT)
Dept: FAMILY MEDICINE | Facility: CLINIC | Age: 29
End: 2024-08-07
Payer: MEDICARE

## 2024-08-07 ENCOUNTER — HOSPITAL ENCOUNTER (EMERGENCY)
Facility: CLINIC | Age: 29
Discharge: HOME | End: 2024-08-07
Attending: STUDENT IN AN ORGANIZED HEALTH CARE EDUCATION/TRAINING PROGRAM | Admitting: STUDENT IN AN ORGANIZED HEALTH CARE EDUCATION/TRAINING PROGRAM
Payer: MEDICARE

## 2024-08-07 VITALS
TEMPERATURE: 97.8 F | HEART RATE: 98 BPM | SYSTOLIC BLOOD PRESSURE: 127 MMHG | OXYGEN SATURATION: 99 % | RESPIRATION RATE: 14 BRPM | DIASTOLIC BLOOD PRESSURE: 75 MMHG

## 2024-08-07 DIAGNOSIS — R25.2 MUSCLE CRAMPING: ICD-10-CM

## 2024-08-07 DIAGNOSIS — F41.9 ANXIETY: ICD-10-CM

## 2024-08-07 DIAGNOSIS — F22 PARANOIA (H): ICD-10-CM

## 2024-08-07 PROCEDURE — 99284 EMERGENCY DEPT VISIT MOD MDM: CPT | Performed by: STUDENT IN AN ORGANIZED HEALTH CARE EDUCATION/TRAINING PROGRAM

## 2024-08-07 PROCEDURE — 250N000013 HC RX MED GY IP 250 OP 250 PS 637: Performed by: STUDENT IN AN ORGANIZED HEALTH CARE EDUCATION/TRAINING PROGRAM

## 2024-08-07 PROCEDURE — 96372 THER/PROPH/DIAG INJ SC/IM: CPT | Performed by: STUDENT IN AN ORGANIZED HEALTH CARE EDUCATION/TRAINING PROGRAM

## 2024-08-07 PROCEDURE — 99284 EMERGENCY DEPT VISIT MOD MDM: CPT

## 2024-08-07 PROCEDURE — 250N000011 HC RX IP 250 OP 636: Performed by: STUDENT IN AN ORGANIZED HEALTH CARE EDUCATION/TRAINING PROGRAM

## 2024-08-07 RX ORDER — CYCLOBENZAPRINE HCL 10 MG
10 TABLET ORAL ONCE
Status: COMPLETED | OUTPATIENT
Start: 2024-08-07 | End: 2024-08-07

## 2024-08-07 RX ORDER — CYCLOBENZAPRINE HCL 10 MG
10 TABLET ORAL 3 TIMES DAILY PRN
Qty: 15 TABLET | Refills: 0 | Status: SHIPPED | OUTPATIENT
Start: 2024-08-07 | End: 2024-08-21

## 2024-08-07 RX ORDER — KETOROLAC TROMETHAMINE 30 MG/ML
30 INJECTION, SOLUTION INTRAMUSCULAR; INTRAVENOUS ONCE
Status: COMPLETED | OUTPATIENT
Start: 2024-08-07 | End: 2024-08-07

## 2024-08-07 RX ADMIN — KETOROLAC TROMETHAMINE 30 MG: 30 INJECTION, SOLUTION INTRAMUSCULAR at 12:10

## 2024-08-07 RX ADMIN — CYCLOBENZAPRINE 10 MG: 10 TABLET, FILM COATED ORAL at 12:10

## 2024-08-07 ASSESSMENT — ACTIVITIES OF DAILY LIVING (ADL): ADLS_ACUITY_SCORE: 37

## 2024-08-07 NOTE — DISCHARGE INSTRUCTIONS
I agree with the doctor from yesterday that you would benefit from taking the psychiatric medications that have been prescribed to you.  However, since you do not take them currently, I think it would be reasonable to start a muscle relaxing medication (Flexeril) to help with your cramping.  Use this only as needed and drink plenty of fluids to stay hydrated.    Please follow-up in clinic as scheduled tomorrow.  Return to the emergency department sooner for any new or worsening symptoms.

## 2024-08-07 NOTE — TELEPHONE ENCOUNTER
Reason for Call:  Appointment Request    Patient requesting this type of appt:  Hospital/ED Follow-Up     Requested provider: No Ref-Primary, Physician    Reason patient unable to be scheduled: Not within requested timeframe    When does patient want to be seen/preferred time: Same day    Comments: Pt was in the ER for pain of thigh muscle and sleeping difficulty. He has been instructed to schedule a follow up with primary care. Pt is requesting to be seen today if possible 8/7. Clinic will have to call pt to schedule an appt as first available per central scheduling won't be until 8/22.     Okay to leave a detailed message?: Yes at Home number on file [phone] (home)    Call taken on 8/7/2024 at 7:47 AM by Danielle Rico

## 2024-08-07 NOTE — DISCHARGE INSTRUCTIONS
Ibuprofen 600 mg and Tylenol 1000 mg every 6 hours as needed for pain.  You can take them at the same time.  Take with food so the Ibuprofen doesn't upset your stomach.  Ice or heat may be helpful.  Take your hydroxyzine as prescribed for anxiety and it should also help you sleep.  I highly recommend that you get back on the rest of your medications including the Haldol.  Take your Cogentin to help with side effects in that regard and if the Depakote keeps you awake, take it in the morning rather than later in the day.  Expect a call from the schedulers to set you up to see a primary provider to establish care.  Follow-up with psychiatry as scheduled.  It was nice visiting with you tonight.  I hope things settle down quickly for you.    Thank you for choosing [facility]. We appreciate the opportunity to meet your urgent medical needs. Please let us know if we could have done anything to make your stay more satisfying.    After discharge, please closely monitor for any new or worsening symptoms. Return to the Emergency Department if you develop any acute worsening signs or symptoms.    If you had lab work, cultures or imaging studies done during your stay, the final results may still be pending. We will call you if your plan of care needs to change. However, if you are not improving as expected, please follow up with your primary care provider or clinic.     Start any prescription medications that were prescribed to you and take them as directed.     Please see additional handouts that may be pertinent to your condition.
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
## 2024-08-07 NOTE — ED TRIAGE NOTES
"Brought in via EMS but unsure what his chief complaint. States he wanted a same day appointment but was unable to to get in. \"Maybe I should be checked for parkinson's.\" \"I think there is CO2 in the house\"     Triage Assessment (Adult)       Row Name 08/07/24 1146          Triage Assessment    Airway WDL WDL        Respiratory WDL    Respiratory WDL WDL        Cardiac WDL    Cardiac WDL WDL        Cognitive/Neuro/Behavioral WDL    Cognitive/Neuro/Behavioral WDL X     Orientation person;place;time;situation     Speech pace/rate variance  speaks slowly and stares     Mood/Behavior other (see comments)  appears angry but denies self harm or harm to others                     "

## 2024-08-07 NOTE — ED NOTES
"Pacing in room=\"My ride is outside\". Informed pt Dr Mckeon is out of the department at this time. Will discharge when able  "
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
## 2024-08-07 NOTE — TELEPHONE ENCOUNTER
Patient contacted and reviewed note from Radha Garcia. Patient reported pain in thigh has not improved. Offered appointment for tomorrow with Marisol Jaimes. Appointment scheduled 8/8/24 at 10 am with 9:40 check-in.  Patient gave verbal understanding of this check-in time for appointment.     Sara Villegas, RN on 8/7/2024 at 9:27 AM      Note attached from Radha Garcia, Radha HOLBROOK, NP  8/7/24  Please call patient. I do not have availability today. Marisol has some openings tomorrow or Friday, I am pretty well booked tomorrow too it looks like. If he is worsening, he will need to return to the ED

## 2024-08-07 NOTE — ED TRIAGE NOTES
"Pt arrives from home with EMS. Pt called to report R hand pain, chest pain, bilateral leg pain, wheezing, emesis x1 and urinary frequency x2. Upon arrival pt states he does not take any of his prescribed medication as they \"are wrong for him\". Has not taken Depakote or any of his other mental health medications. Pt does not feel safe at home. Is difficult to follow with ideas and conversation. Says he has not slept in a long time. Denies meth use but does reports hx of meth use.       Triage Assessment (Adult)       Row Name 08/06/24 1530          Triage Assessment    Airway WDL WDL        Respiratory WDL    Respiratory WDL WDL        Skin Circulation/Temperature WDL    Skin Circulation/Temperature WDL WDL        Cardiac WDL    Cardiac WDL WDL        Peripheral/Neurovascular WDL    Peripheral Neurovascular WDL WDL        Cognitive/Neuro/Behavioral WDL    Cognitive/Neuro/Behavioral WDL X;mood/behavior     Level of Consciousness alert     Mood/Behavior anxious;hyperactive (agitated, impulsive)        Pupils (CN II)    Pupil PERRLA yes     Pupil Size Left 2 mm     Pupil Size Right 2 mm                     "

## 2024-08-21 ENCOUNTER — OFFICE VISIT (OUTPATIENT)
Dept: FAMILY MEDICINE | Facility: CLINIC | Age: 29
End: 2024-08-21
Payer: MEDICARE

## 2024-08-21 VITALS
WEIGHT: 148.25 LBS | TEMPERATURE: 98 F | HEIGHT: 67 IN | OXYGEN SATURATION: 99 % | DIASTOLIC BLOOD PRESSURE: 98 MMHG | RESPIRATION RATE: 22 BRPM | BODY MASS INDEX: 23.27 KG/M2 | SYSTOLIC BLOOD PRESSURE: 142 MMHG | HEART RATE: 80 BPM

## 2024-08-21 DIAGNOSIS — Z09 HOSPITAL DISCHARGE FOLLOW-UP: Primary | ICD-10-CM

## 2024-08-21 DIAGNOSIS — M79.10 MUSCLE PAIN: ICD-10-CM

## 2024-08-21 DIAGNOSIS — F20.0 PARANOID SCHIZOPHRENIA (H): ICD-10-CM

## 2024-08-21 DIAGNOSIS — F15.20 AMPHETAMINE USE DISORDER, SEVERE (H): ICD-10-CM

## 2024-08-21 DIAGNOSIS — F31.13 BIPOLAR AFFECTIVE DISORDER, MANIC, SEVERE (H): ICD-10-CM

## 2024-08-21 DIAGNOSIS — F29 PSYCHOSIS, UNSPECIFIED PSYCHOSIS TYPE (H): ICD-10-CM

## 2024-08-21 PROCEDURE — 99204 OFFICE O/P NEW MOD 45 MIN: CPT | Performed by: NURSE PRACTITIONER

## 2024-08-21 RX ORDER — CYCLOBENZAPRINE HCL 5 MG
5 TABLET ORAL 3 TIMES DAILY PRN
Qty: 30 TABLET | Refills: 1 | Status: SHIPPED | OUTPATIENT
Start: 2024-08-21

## 2024-08-21 ASSESSMENT — PAIN SCALES - GENERAL: PAINLEVEL: MILD PAIN (2)
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
## 2024-09-09 ENCOUNTER — HOSPITAL ENCOUNTER (OUTPATIENT)
Facility: CLINIC | Age: 29
Setting detail: OBSERVATION
Discharge: HOME | End: 2024-09-11
Attending: EMERGENCY MEDICINE | Admitting: EMERGENCY MEDICINE
Payer: MEDICARE

## 2024-09-09 DIAGNOSIS — R44.3 HALLUCINATIONS: ICD-10-CM

## 2024-09-09 DIAGNOSIS — T43.505D: ICD-10-CM

## 2024-09-09 DIAGNOSIS — F25.0 SCHIZOAFFECTIVE DISORDER, BIPOLAR TYPE (H): ICD-10-CM

## 2024-09-09 PROCEDURE — 99285 EMERGENCY DEPT VISIT HI MDM: CPT | Mod: 25

## 2024-09-09 ASSESSMENT — ACTIVITIES OF DAILY LIVING (ADL): ADLS_ACUITY_SCORE: 37

## 2024-09-10 PROBLEM — R44.3 HALLUCINATIONS: Status: ACTIVE | Noted: 2024-09-10

## 2024-09-10 PROBLEM — F29 PSYCHOSIS, UNSPECIFIED PSYCHOSIS TYPE (H): Status: ACTIVE | Noted: 2017-12-29

## 2024-09-10 PROCEDURE — 250N000013 HC RX MED GY IP 250 OP 250 PS 637: Performed by: NURSE PRACTITIONER

## 2024-09-10 PROCEDURE — 250N000013 HC RX MED GY IP 250 OP 250 PS 637: Performed by: STUDENT IN AN ORGANIZED HEALTH CARE EDUCATION/TRAINING PROGRAM

## 2024-09-10 PROCEDURE — 250N000011 HC RX IP 250 OP 636: Performed by: PSYCHIATRY & NEUROLOGY

## 2024-09-10 PROCEDURE — G0378 HOSPITAL OBSERVATION PER HR: HCPCS

## 2024-09-10 PROCEDURE — 99222 1ST HOSP IP/OBS MODERATE 55: CPT | Mod: AI | Performed by: PSYCHIATRY & NEUROLOGY

## 2024-09-10 PROCEDURE — 96372 THER/PROPH/DIAG INJ SC/IM: CPT | Performed by: PSYCHIATRY & NEUROLOGY

## 2024-09-10 PROCEDURE — 250N000013 HC RX MED GY IP 250 OP 250 PS 637: Performed by: PSYCHIATRY & NEUROLOGY

## 2024-09-10 RX ORDER — LORAZEPAM 2 MG/ML
2 INJECTION INTRAMUSCULAR EVERY 6 HOURS PRN
Status: DISCONTINUED | OUTPATIENT
Start: 2024-09-10 | End: 2024-09-11 | Stop reason: HOSPADM

## 2024-09-10 RX ORDER — BENZTROPINE MESYLATE 1 MG/1
2 TABLET ORAL 2 TIMES DAILY
Status: DISCONTINUED | OUTPATIENT
Start: 2024-09-10 | End: 2024-09-10

## 2024-09-10 RX ORDER — HALOPERIDOL 5 MG/1
5 TABLET ORAL 2 TIMES DAILY PRN
Qty: 10 TABLET | Refills: 0 | Status: SHIPPED | OUTPATIENT
Start: 2024-09-10

## 2024-09-10 RX ORDER — HALOPERIDOL 5 MG/1
15 TABLET ORAL AT BEDTIME
Status: DISCONTINUED | OUTPATIENT
Start: 2024-09-10 | End: 2024-09-11 | Stop reason: HOSPADM

## 2024-09-10 RX ORDER — DIVALPROEX SODIUM 500 MG/1
1000 TABLET, EXTENDED RELEASE ORAL AT BEDTIME
Qty: 30 TABLET | Refills: 0 | Status: SHIPPED | OUTPATIENT
Start: 2024-09-10 | End: 2024-09-25

## 2024-09-10 RX ORDER — OLANZAPINE 10 MG/1
10 TABLET ORAL EVERY 8 HOURS PRN
Status: DISCONTINUED | OUTPATIENT
Start: 2024-09-10 | End: 2024-09-11 | Stop reason: HOSPADM

## 2024-09-10 RX ORDER — POLYETHYLENE GLYCOL 3350 17 G
4 POWDER IN PACKET (EA) ORAL
Status: DISCONTINUED | OUTPATIENT
Start: 2024-09-10 | End: 2024-09-11 | Stop reason: ALTCHOICE

## 2024-09-10 RX ORDER — DIPHENHYDRAMINE HYDROCHLORIDE 50 MG/ML
50 INJECTION INTRAMUSCULAR; INTRAVENOUS ONCE
Status: COMPLETED | OUTPATIENT
Start: 2024-09-10 | End: 2024-09-10

## 2024-09-10 RX ORDER — HALOPERIDOL 5 MG/ML
10 INJECTION INTRAMUSCULAR EVERY 6 HOURS PRN
Status: DISCONTINUED | OUTPATIENT
Start: 2024-09-10 | End: 2024-09-11 | Stop reason: HOSPADM

## 2024-09-10 RX ORDER — HYDROXYZINE HYDROCHLORIDE 50 MG/1
50 TABLET, FILM COATED ORAL EVERY 6 HOURS PRN
Status: DISCONTINUED | OUTPATIENT
Start: 2024-09-10 | End: 2024-09-11 | Stop reason: HOSPADM

## 2024-09-10 RX ORDER — HALOPERIDOL DECANOATE 100 MG/ML
100 INJECTION INTRAMUSCULAR ONCE
Status: DISCONTINUED | OUTPATIENT
Start: 2024-09-10 | End: 2024-09-10

## 2024-09-10 RX ORDER — BENZTROPINE MESYLATE 1 MG/ML
1 INJECTION, SOLUTION INTRAMUSCULAR; INTRAVENOUS EVERY 6 HOURS PRN
Status: DISCONTINUED | OUTPATIENT
Start: 2024-09-10 | End: 2024-09-11 | Stop reason: HOSPADM

## 2024-09-10 RX ORDER — HYDROXYZINE HYDROCHLORIDE 25 MG/1
25 TABLET, FILM COATED ORAL 3 TIMES DAILY PRN
Qty: 15 TABLET | Refills: 0 | Status: SHIPPED | OUTPATIENT
Start: 2024-09-10

## 2024-09-10 RX ORDER — OLANZAPINE 5 MG/1
5 TABLET ORAL EVERY 8 HOURS PRN
Status: DISCONTINUED | OUTPATIENT
Start: 2024-09-10 | End: 2024-09-10

## 2024-09-10 RX ORDER — HALOPERIDOL 5 MG/1
20 TABLET ORAL AT BEDTIME
Status: DISCONTINUED | OUTPATIENT
Start: 2024-09-10 | End: 2024-09-10

## 2024-09-10 RX ORDER — BENZTROPINE MESYLATE 1 MG/1
2 TABLET ORAL 2 TIMES DAILY
Status: DISCONTINUED | OUTPATIENT
Start: 2024-09-10 | End: 2024-09-11 | Stop reason: HOSPADM

## 2024-09-10 RX ORDER — TRAZODONE HYDROCHLORIDE 50 MG/1
50 TABLET, FILM COATED ORAL
Status: DISCONTINUED | OUTPATIENT
Start: 2024-09-10 | End: 2024-09-11 | Stop reason: HOSPADM

## 2024-09-10 RX ORDER — ACETAMINOPHEN 325 MG/1
650 TABLET ORAL EVERY 4 HOURS PRN
Status: DISCONTINUED | OUTPATIENT
Start: 2024-09-10 | End: 2024-09-11 | Stop reason: HOSPADM

## 2024-09-10 RX ORDER — BENZTROPINE MESYLATE 1 MG/1
TABLET ORAL
Qty: 20 TABLET | Refills: 0 | Status: SHIPPED | OUTPATIENT
Start: 2024-09-10 | End: 2024-09-11

## 2024-09-10 RX ORDER — ONDANSETRON 4 MG/1
4 TABLET, ORALLY DISINTEGRATING ORAL EVERY 6 HOURS PRN
Status: DISCONTINUED | OUTPATIENT
Start: 2024-09-10 | End: 2024-09-11 | Stop reason: HOSPADM

## 2024-09-10 RX ORDER — DIVALPROEX SODIUM 250 MG/1
250 TABLET, EXTENDED RELEASE ORAL AT BEDTIME
Qty: 15 TABLET | Refills: 0 | Status: SHIPPED | OUTPATIENT
Start: 2024-09-10 | End: 2024-09-25

## 2024-09-10 RX ADMIN — NICOTINE POLACRILEX 4 MG: 2 LOZENGE ORAL at 19:54

## 2024-09-10 RX ADMIN — HALOPERIDOL 20 MG: 5 TABLET ORAL at 00:34

## 2024-09-10 RX ADMIN — NICOTINE POLACRILEX 4 MG: 4 GUM, CHEWING BUCCAL at 13:49

## 2024-09-10 RX ADMIN — DIPHENHYDRAMINE HYDROCHLORIDE 50 MG: 50 INJECTION, SOLUTION INTRAMUSCULAR; INTRAVENOUS at 14:36

## 2024-09-10 RX ADMIN — BENZTROPINE MESYLATE 2 MG: 1 TABLET ORAL at 21:30

## 2024-09-10 RX ADMIN — NICOTINE POLACRILEX 4 MG: 2 LOZENGE ORAL at 20:54

## 2024-09-10 RX ADMIN — NICOTINE POLACRILEX 4 MG: 2 LOZENGE ORAL at 17:38

## 2024-09-10 RX ADMIN — HYDROXYZINE HYDROCHLORIDE 50 MG: 50 TABLET, FILM COATED ORAL at 18:24

## 2024-09-10 RX ADMIN — HYDROXYZINE HYDROCHLORIDE 50 MG: 50 TABLET, FILM COATED ORAL at 00:35

## 2024-09-10 RX ADMIN — NICOTINE POLACRILEX 4 MG: 4 GUM, CHEWING BUCCAL at 10:35

## 2024-09-10 RX ADMIN — HALOPERIDOL 15 MG: 5 TABLET ORAL at 21:30

## 2024-09-10 RX ADMIN — NICOTINE POLACRILEX 4 MG: 2 LOZENGE ORAL at 21:51

## 2024-09-10 RX ADMIN — NICOTINE POLACRILEX 4 MG: 2 LOZENGE ORAL at 18:50

## 2024-09-10 RX ADMIN — NICOTINE POLACRILEX 4 MG: 4 GUM, CHEWING BUCCAL at 12:24

## 2024-09-10 ASSESSMENT — ACTIVITIES OF DAILY LIVING (ADL)
ADLS_ACUITY_SCORE: 37

## 2024-09-10 ASSESSMENT — COLUMBIA-SUICIDE SEVERITY RATING SCALE - C-SSRS
2. HAVE YOU ACTUALLY HAD ANY THOUGHTS OF KILLING YOURSELF?: NO
TOTAL  NUMBER OF ABORTED OR SELF INTERRUPTED ATTEMPTS SINCE LAST CONTACT: NO
SUICIDE, SINCE LAST CONTACT: NO
1. SINCE LAST CONTACT, HAVE YOU WISHED YOU WERE DEAD OR WISHED YOU COULD GO TO SLEEP AND NOT WAKE UP?: NO
ATTEMPT SINCE LAST CONTACT: NO
6. HAVE YOU EVER DONE ANYTHING, STARTED TO DO ANYTHING, OR PREPARED TO DO ANYTHING TO END YOUR LIFE?: NO
TOTAL  NUMBER OF INTERRUPTED ATTEMPTS SINCE LAST CONTACT: NO

## 2024-09-10 NOTE — ED NOTES
Patient talking to the psych assistant and told him he is doing better after taking the meds. He talked about being in a bad place yesterday.  Taking nicotine and coffee.

## 2024-09-10 NOTE — ED NOTES
[facility]  ED to EMPATH Checklist:      Goal for EMPATH: Medication management, Bipolar, and Hallucinations    Current Behavior: Flat Affect cooperative    Safety Concerns: Auditory Hallucinations    Legal Hold Status: Voluntary    Medically Cleared by ED provider: Yes    Patient Therapeutically Searched: Not searched - Currently in triage    Belongings: In room locker    Independent Ambulation at Baseline: Yes/No: Yes    Participates in Care/Conversation: Yes/No: Yes    Patient Informed about EMPATH: Yes/No: Yes    DEC: Not ordered    Patient Ready to be Transferred to EMPATH? Yes/No: Yes

## 2024-09-10 NOTE — ED NOTES
Bed: ED22  Expected date: 9/9/24  Expected time: 11:00 PM  Means of arrival: Ambulance  Comments:  Kwasi 542 28M  maicol; no hold
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
## 2024-09-10 NOTE — PROGRESS NOTES
Pt paced unit for several minutes after meeting with LMHP, returned to the recliner and appeared to rest comfortably with eyes closed for the remainder of the shift.  Observed resp even and unlabored during rounds, no signs of distress or discomfort noted

## 2024-09-10 NOTE — ED TRIAGE NOTES
BIBA from [facility] for medication adjustment.  Patient on haldol and depakote for schizoaffective and bipolar disorders.  States medications are not working properly and needs im haldol. Denies SI or HI     Triage Assessment (Adult)       Row Name 09/09/24 9481          Triage Assessment    Airway WDL WDL        Respiratory WDL    Respiratory WDL WDL        Skin Circulation/Temperature WDL    Skin Circulation/Temperature WDL WDL        Cardiac WDL    Cardiac WDL WDL        Peripheral/Neurovascular WDL    Peripheral Neurovascular WDL WDL        Cognitive/Neuro/Behavioral WDL    Cognitive/Neuro/Behavioral WDL WDL

## 2024-09-10 NOTE — ED NOTES
Patient was having extrapyramidal side effects of his eyes just prior to giving him haldol deconate.  Dr Tapia ordered benedryl IM as a one time dose.  He then stopped the haldol deconate and ordered cogentin bid.  He spoke to the patient about staying overnight to be observed.   About 30 minutes later the patient asked if he was leaving and the writer explained that Dr. Tapia had wanted him to stay overnight and be observed.  He remains restless and pacing as he has for most of the day.

## 2024-09-10 NOTE — PLAN OF CARE
Joseph HOLBROOK Denysjarrod  September 10, 2024  Plan of Care Hand-off Note     Patient Care Path: observation    Plan for Care:   A lower level of care has been unsuccessful in treating and stabilizing patient s mental health symptoms. Patient will remain on EmPATH unit under observation for continued monitoring, treatment and therapeutic intervention of mental health symptoms. Observation at [facility] could help mitigate the need for a more restrictive level of care in an inpatient setting.    Identified Goals and Safety Issues: symptom reduction, engage in safety planning    Overview:  Joseph Jayashley Sr, father, [phone]      Legal Status: Legal Status at Admission: Voluntary/Patient has signed consent for treatment    Psychiatry Consult: pt will meet with psychiatry provider while on EmPATH       Updated RN regarding plan of care.           Miranda Haddad, LPCC, LADC

## 2024-09-10 NOTE — ED PROVIDER NOTES
"  Emergency Department Note      History of Present Illness     Chief Complaint   Mental Health Problem      HPI   Joseph Stephens is a 28 year old male with history of schizoaffective disorder who presents with concerns for increased to his nations and possible need for adjustment of his medications.  The patient states that he is not feeling suicidal or homicidal.  He does state that he is afraid if he does not have his medications adjusted that he could become more aggressive.  States that when he feels this way he sometimes needs an extra dose of Haldol.    Independent Historian   None    Review of External Notes   Discharge summary from 8/14/2024 reviewed.  He was admitted to inpatient mental health because of decompensation due to medication noncompliance as well as substance abuse.    Past Medical History     Medical History and Problem List   No past medical history on file.    Medications   benztropine (COGENTIN) 1 MG tablet  cyclobenzaprine (FLEXERIL) 5 MG tablet  divalproex sodium extended-release (DEPAKOTE ER) 250 MG 24 hr tablet  divalproex sodium extended-release (DEPAKOTE ER) 500 MG 24 hr tablet  haloperidol (HALDOL) 5 MG tablet  hydrOXYzine HCl (ATARAX) 25 MG tablet        Surgical History   Past Surgical History:   Procedure Laterality Date    NO HISTORY OF SURGERY         Physical Exam     Patient Vitals for the past 24 hrs:   BP Temp Temp src Pulse Resp SpO2 Height Weight   09/10/24 0013 (!) 146/94 97.8  F (36.6  C) Oral 76 20 97 % 1.727 m (5' 8\") 60.1 kg (132 lb 8 oz)   09/09/24 2310 131/88 98.2  F (36.8  C) Oral 74 16 97 % -- 63.5 kg (140 lb)     Physical Exam  General: Sitting on the ED bed  HEENT: Normocephalic, atraumatic  Cardiac: Well perfused  Pulm: Breathing comfortably, no accessory muscle usage, no conversational dyspnea  MSK: No bony deformities  Skin: Dry  Neuro: Moves all extremities  Psych: Pleasant mood and affect, calm, cooperative      Diagnostics     Independent " Interpretation   None    ED Course      Medications Administered   Medications   hydrOXYzine HCl (ATARAX) tablet 50 mg (has no administration in time range)   acetaminophen (TYLENOL) tablet 650 mg (has no administration in time range)   traZODone (DESYREL) tablet 50 mg (has no administration in time range)   haloperidol (HALDOL) tablet 20 mg (has no administration in time range)   ondansetron (ZOFRAN ODT) ODT tab 4 mg (has no administration in time range)   OLANZapine (zyPREXA) tablet 5 mg (has no administration in time range)       Procedures   Procedures     Discussion of Management   None    ED Course        Additional Documentation  None    Medical Decision Making / Diagnosis     CMS Diagnoses: None    MIPS       None    MDM   Joseph Stephens is a 28 year old male who presents with increasing hallucinations as above.  This is in the context of schizoaffective disorder complicated by substance use.  He is calm and cooperative tonight, no evidence of acute psychosis and denies HI/SI.  Vital signs are reassuring.  No evidence of acute medical illness requiring lab work or imaging in the ED.  I do think the patient would benefit from further evaluation and treatment with our mental health team in [facility].  The patient is agreeable as well and we will plan for the same.  Patient transferred to [facility].     Disposition   The patient was transferred to [facility].     Diagnosis     ICD-10-CM    1. Hallucinations  R44.3              MD Sander IRELAND Colin, MD  09/10/24 0029

## 2024-09-10 NOTE — ED NOTES
Pt has been pacing the lounge most of the evening and has been feeling a bit anxious . Pt was offered PRN 50 mg of hydroxyzine. Pt was reassured that he would be continuing to take Haldol tonight and that the plan is to have the pt stay over night on OBS and reassess medications. Pt is willing to take his medication tonight and is agreeable with the plan for reassessment to tomorrow. Pt denies any SI or any hallucinations. Pt denies any homicidal thoughts and has been pleasant and cooperative. No EPS sx observed this shift. Nursing to continue to monitor.

## 2024-09-10 NOTE — PSYCH
Writer received call for empath admission orders regarding patient who is here voluntarily for psychosis. Patient reported to be medically cleared. Vitals reviewed and stable. Most recent labs in chart from ~ 1 month ago. Admission orders placed.

## 2024-09-10 NOTE — DISCHARGE INSTRUCTIONS
Your Upcoming Appointment  Type: Medication Mgmt - Initial (In-Person)  Date: Wednesday, 9/25/2024  Time: 2:20 pm - 3:20 pm  Provider: Iftikhar Guerrero  MSN  CNP,PMHNP,RN  Location: [facility], [address] [place] [address], [address], [place] [address]  Phone: [phone]  Patient Instructions: Before your appointment, you must speak with our Intake Department. Our intake team will attempt to contact you. If you do not hear from them within 24 hours, please call them at [phone] and tell them you are a [facility] referral. If you do not speak with our Intake Department and complete the necessary paperwork they send you, we cannot see you at your scheduled appointment time. Appointment times are not guaranteed until verified with [facility] intake team.
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
## 2024-09-10 NOTE — ED NOTES
Patient is up now.  He took some coffee and is pacing the unit.  Writer asked him if he would take anything for anxiety and he said no.  He did sign a release for his mom.  He has miminal eye contact and one word answers.  He did ask for nicorette gum.

## 2024-09-10 NOTE — ED NOTES
Patient continues to pace.  He ate lunch and had vital signs.  He denies suicidal ideation and denies hallucinations at this time.

## 2024-09-10 NOTE — PROGRESS NOTES
"28 year old male transferred to [facility] from ED due to hallucinations and feeling like his current medications are not working. Reports auditory hallucinations, command in nature, states that \"people are making me speak\".  He requested a haldol injection several times during assessment, states \"that's the only thing that works.\"  He is unable to describe the voices.  Pt denies SI/HI.  States he is 1 month sober from meth use, endorses recent cannabis use.  Pt minimally cooperative in intake, guarded in assessments, occasional response lag.  Demonstrated some thought blocking, put head down and stopped responding twice during intake.  He does not have a therapist or medication manager and would like referrals.  Nursing and risk assessments completed. Admission information reviewed with patient. Patient given a tour of [facility] and instructions on using the facility. Questions regarding [facility] addressed. Pt safety search completed.    "

## 2024-09-10 NOTE — ED PROVIDER NOTES
[facility] Unit - Psychiatry  Combined Observation Note and Discharge Summary  [facility] Emergency Department  Observation Initiation Date: Sep 9, 2024    Joseph Stephens MRN: [id number]   Age: 28 year old YOB: 1995     History     Chief Complaint   Patient presents with    Mental Health Problem     HPI  Joseph Stephens is a 28 year old male with a past history notable for schizoaffective disorder, bipolar type who came to the emergency department seeking mental health treatment.  He had reported experiencing auditory hallucinations contributing to an increased sense of agitation and he did not wish this to escalate towards becoming aggressive.  He was determined to be medically stable and transferred to the [facility] unit for psychiatric assessment.  He is now approaching 14 hours in the emergency department.  On examination, the patient's reports that recent symptom exacerbation is likely related to variable medication adherence.  He admits that he is fearful of taking antipsychotic medications as he has experienced extrapyramidal side effects in the past.  He recalls good tolerability to Haldol decanoate and interprets maintaining good symptom management when previously receiving those injections.  He does not recall what dose he was taking.  He eventually encountered transportation difficulties which led to discontinuation of treatment.  He has attempted to restart oral Haldol however frequently challenged by maintaining routine and regular adherence.  This was further challenged after he was kicked out of his mother's home, leading to a state of homelessness, and resorting to staying in a hotel.  He presents today as help seeking and desiring consultation to optimize his medication plan, targeting reduction of auditory hallucinations and mood irritability.  He was hesitant to discuss the content of hallucinations however denied command hallucinations to harm himself or others.  He denied  "suicidal and homicidal thoughts.  He is not seeking psychiatric hospitalization.  He was agreeable to the medication plan as outlined below.      Past Medical History  No past medical history on file.  Past Surgical History:   Procedure Laterality Date    NO HISTORY OF SURGERY       benztropine (COGENTIN) 1 MG tablet  cyclobenzaprine (FLEXERIL) 5 MG tablet  divalproex sodium extended-release (DEPAKOTE ER) 250 MG 24 hr tablet  divalproex sodium extended-release (DEPAKOTE ER) 500 MG 24 hr tablet  haloperidol (HALDOL) 5 MG tablet  hydrOXYzine HCl (ATARAX) 25 MG tablet      No Known Allergies  Family History  Family History   Problem Relation Age of Onset    Neurologic Disorder Maternal Grandmother      Social History   Social History     Tobacco Use    Smoking status: Every Day     Current packs/day: 1.00     Average packs/day: 1 pack/day for 9.0 years (9.0 ttl pk-yrs)     Types: Cigarettes    Smokeless tobacco: Never   Substance Use Topics    Alcohol use: Yes     Comment: rarely    Drug use: Yes     Types: Marijuana          Review of Systems  A medically appropriate review of systems was performed with pertinent positives and negatives noted in the HPI, and all other systems negative.    Physical Examination   BP: 131/88  Pulse: 74  Temp: 98.2  F (36.8  C)  Resp: 16  Height: 172.7 cm (5' 8\")  Weight: 63.5 kg (140 lb)  SpO2: 97 %    Physical Exam  General: Appears stated age.   Neuro: Alert and fully oriented. Extremities appear to demonstrate normal strength on visual inspection.   Integumentary/Skin: no rash visualized, normal color    Psychiatric Examination   Appearance: awake, alert  Attitude:  cooperative and guarded  Eye Contact:  fair  Mood:  anxious and better  Affect:  intensity is flat  Speech:  clear, coherent  Psychomotor Behavior:  no evidence of tardive dyskinesia, dystonia, or tics  Thought Process:  linear  Associations:  no loose associations  Thought Content:  no evidence of suicidal ideation or " homicidal ideation and auditory hallucinations present  Insight:  fair  Judgement:  fair  Oriented to:  time, person, and place  Attention Span and Concentration:  fair  Recent and Remote Memory:  fair  Language: able to name/identify objects without impairment  Fund of Knowledge: intact with awareness of current and past events    ED Course        Labs Ordered and Resulted from Time of ED Arrival to Time of ED Departure - No data to display    Assessments & Plan (with Medical Decision Making)   Patient presenting with mood irritability and auditory hallucinations which are likely worse due to nonadherence with his psychotropic medications.  His treatment plan is focused on resuming medications that have been helpful to him while helping him to reestablish with outpatient providers for ongoing care and management. Nursing notes reviewed noting no acute issues.     I have reviewed the assessment completed by the [facility].     During the observation period, the patient did not require medications for agitation, and did not require restraints/seclusion for patient and/or provider safety.    The patient was found to have a psychiatric condition that would benefit from an observation stay in the emergency department for further psychiatric stabilization and/or coordination of a safe disposition. The observation plan includes serial assessments of psychiatric condition, potential administration of medications if indicated, further disposition pending the patient's psychiatric course during the monitoring period.     Preliminary diagnosis:    ICD-10-CM    1. Hallucinations  R44.3       2. Schizoaffective disorder, bipolar type (H)  F25.0 divalproex sodium extended-release (DEPAKOTE ER) 250 MG 24 hr tablet     divalproex sodium extended-release (DEPAKOTE ER) 500 MG 24 hr tablet     haloperidol (HALDOL) 5 MG tablet          Treatment Plan:  -Restart Haldol decanoate for treatment of symptoms associated with his schizoaffective  disorder.  Haldol decanoate will be initiated at 100 mg today, injected intramuscularly, while planning to administer the second portion of his loading dose in 3 to 7 days with another 100 mg dose.  Afterwards, his monthly dose will be 200 mg injected every 30 days.  This should reflect an approximate oral equivalent dose of 20 mg daily.  Risks and benefits were reviewed with the patient.  -Resume Depakote ER 1250 mg daily for additional mood stabilization  -Resume Cogentin 1 mg twice a day, scheduled for the first 7 days then changed to 1 mg twice a day as needed for management of extrapyramidal symptoms.  -Hydroxyzine 25 mg 3 times a day as needed for reduction of anxiety  -Oral Haldol 5 mg twice a day as needed for reduction of hallucinations until Haldol decanoate is able to provide adequate relief of symptoms.  -The patient is not seeking psychiatric hospitalization  -Referral for outpatient psychiatric medication management  -Discharged back to the community today.    Addendum: The patient later reported to nursing staff that he was having difficulty what was described as a possible oculogyric extrapyramidal side effect.  He was reportedly covering his eyes at the time therefore nursing staff could not observe the symptom.  I met with the patient to reassess at which time he was making eye contact with me and able to redirect his eyes.  During my meeting with him, he did not exhibit EPS although maintains high anxiety about that possibility.  Noting that he has experienced this in the past per his report, we will proceed with preventative measures: Administer intramuscular Benadryl 50 mg 1 time, schedule Cogentin 2 mg oral tablet twice a day, cancel administration of Haldol decanoate today until he can be reassessed tomorrow, cancel plans to discharge and proceed with observation status overnight.  --  Garth Tapia MD   [facility] EMERGENCY DEPT  [facility] Unit        Garth Tapia,  MD  09/10/24 1304       Garth Tapia MD  09/10/24 9610

## 2024-09-11 VITALS
HEIGHT: 68 IN | WEIGHT: 132.5 LBS | TEMPERATURE: 98.2 F | OXYGEN SATURATION: 97 % | RESPIRATION RATE: 16 BRPM | HEART RATE: 97 BPM | DIASTOLIC BLOOD PRESSURE: 88 MMHG | SYSTOLIC BLOOD PRESSURE: 123 MMHG | BODY MASS INDEX: 20.08 KG/M2

## 2024-09-11 PROCEDURE — 250N000011 HC RX IP 250 OP 636: Performed by: PSYCHIATRY & NEUROLOGY

## 2024-09-11 PROCEDURE — 250N000013 HC RX MED GY IP 250 OP 250 PS 637: Performed by: PSYCHIATRY & NEUROLOGY

## 2024-09-11 PROCEDURE — 250N000013 HC RX MED GY IP 250 OP 250 PS 637: Performed by: NURSE PRACTITIONER

## 2024-09-11 PROCEDURE — 96372 THER/PROPH/DIAG INJ SC/IM: CPT | Performed by: PSYCHIATRY & NEUROLOGY

## 2024-09-11 PROCEDURE — 250N000013 HC RX MED GY IP 250 OP 250 PS 637: Performed by: STUDENT IN AN ORGANIZED HEALTH CARE EDUCATION/TRAINING PROGRAM

## 2024-09-11 PROCEDURE — 99239 HOSP IP/OBS DSCHRG MGMT >30: CPT | Performed by: PSYCHIATRY & NEUROLOGY

## 2024-09-11 PROCEDURE — G0378 HOSPITAL OBSERVATION PER HR: HCPCS

## 2024-09-11 RX ORDER — HALOPERIDOL DECANOATE 100 MG/ML
100 INJECTION INTRAMUSCULAR ONCE
Status: COMPLETED | OUTPATIENT
Start: 2024-09-11 | End: 2024-09-11

## 2024-09-11 RX ORDER — BENZTROPINE MESYLATE 2 MG/1
2 TABLET ORAL 2 TIMES DAILY
Qty: 30 TABLET | Refills: 0 | Status: SHIPPED | OUTPATIENT
Start: 2024-09-11 | End: 2024-09-26

## 2024-09-11 RX ADMIN — NICOTINE POLACRILEX 4 MG: 2 LOZENGE ORAL at 12:14

## 2024-09-11 RX ADMIN — NICOTINE POLACRILEX 4 MG: 2 LOZENGE ORAL at 08:15

## 2024-09-11 RX ADMIN — HALOPERIDOL DECANOATE 100 MG: 100 INJECTION INTRAMUSCULAR at 10:50

## 2024-09-11 RX ADMIN — NICOTINE POLACRILEX 2 MG: 2 GUM, CHEWING BUCCAL at 13:09

## 2024-09-11 RX ADMIN — NICOTINE POLACRILEX 4 MG: 2 LOZENGE ORAL at 09:16

## 2024-09-11 RX ADMIN — NICOTINE POLACRILEX 4 MG: 2 LOZENGE ORAL at 11:01

## 2024-09-11 RX ADMIN — BENZTROPINE MESYLATE 2 MG: 1 TABLET ORAL at 09:15

## 2024-09-11 RX ADMIN — HYDROXYZINE HYDROCHLORIDE 50 MG: 50 TABLET, FILM COATED ORAL at 14:13

## 2024-09-11 RX ADMIN — NICOTINE POLACRILEX 2 MG: 2 GUM, CHEWING BUCCAL at 15:13

## 2024-09-11 ASSESSMENT — COLUMBIA-SUICIDE SEVERITY RATING SCALE - C-SSRS
1. SINCE LAST CONTACT, HAVE YOU WISHED YOU WERE DEAD OR WISHED YOU COULD GO TO SLEEP AND NOT WAKE UP?: NO
TOTAL  NUMBER OF INTERRUPTED ATTEMPTS SINCE LAST CONTACT: NO
TOTAL  NUMBER OF ABORTED OR SELF INTERRUPTED ATTEMPTS SINCE LAST CONTACT: NO
ATTEMPT SINCE LAST CONTACT: NO
SUICIDE, SINCE LAST CONTACT: NO
2. HAVE YOU ACTUALLY HAD ANY THOUGHTS OF KILLING YOURSELF?: NO
6. HAVE YOU EVER DONE ANYTHING, STARTED TO DO ANYTHING, OR PREPARED TO DO ANYTHING TO END YOUR LIFE?: NO

## 2024-09-11 ASSESSMENT — ACTIVITIES OF DAILY LIVING (ADL)
ADLS_ACUITY_SCORE: 37

## 2024-09-11 NOTE — ED PROVIDER NOTES
EmPATH Unit - Psychiatric Observation Discharge Summary  [facility] Emergency Department  Discharge Date: 9/11/2024    Joseph Stephens MRN: [id number]   Age: 28 year old YOB: 1995     Brief HPI & Initial ED Course     Chief Complaint   Patient presents with    Mental Health Problem     HPI  Joseph Stephens is a 28 year old male with history notable for schizoaffective disorder, bipolar type who is currently under observation status on the EmPATH unit, now approaching 36 hours in the emergency department.  Overnight, there were no acute issues.  As mentioned in my note yesterday, the patient had reported experiencing some eye discomfort in the early afternoon yesterday, presumed to be an oculogyric dystonic reaction from Haldol although not observed by staff or myself on exam.  The patient reported gaining significant improvement after an intramuscular injection of Benadryl, then transitioning to Cogentin later that evening.  He was able to tolerate resuming his oral dose of Haldol in the evening and side effects or other discomforts have not returned since then.  On examination today, the patient reports feeling much better when compared to yesterday.  Auditory hallucinations have decreased in intensity and currently described as moderate.  He denied command hallucinations to harm himself or others.  His demeanor appeared more calm and he has been observed as being more pleasant and social on the unit.  He reports sleeping well last night.  He confirms that dystonic reactions have not occurred since yesterday.  He is comfortable taking Haldol decanoate, continuing to recall maintaining good symptom management in the past when taking this medication.  He is agreeable to follow up with outpatient providers.  He reports improved conversations with his mother and is hopeful that he will be able to return home and avoid staying at a shelter.  He denied suicidal and homicidal  "thoughts.        Physical Examination   BP: 123/88  Pulse: 97  Temp: 98.2  F (36.8  C)  Resp: 16  Height: 172.7 cm (5' 8\")  Weight: 60.1 kg (132 lb 8 oz)  SpO2: 97 %    Physical Exam  General: Appears stated age.   Neuro: Alert and fully oriented. Extremities appear to demonstrate normal strength on visual inspection.   Integumentary/Skin: no rash visualized, normal color    Psychiatric Examination   Appearance: awake, alert  Attitude:  cooperative  Eye Contact:  fair  Mood:  better  Affect:  intensity is flat  Speech:  clear, coherent  Psychomotor Behavior:  no evidence of tardive dyskinesia, dystonia, or tics  Thought Process:  logical and linear  Associations:  no loose associations  Thought Content:  no evidence of suicidal ideation or homicidal ideation and no evidence of psychotic thought  Insight:  fair  Judgement:  fair  Oriented to:  time, person, and place  Attention Span and Concentration:  fair  Recent and Remote Memory:  fair  Language: able to name/identify objects without impairment  Fund of Knowledge: intact with awareness of current and past events    Results        Labs Ordered and Resulted from Time of ED Arrival to Time of ED Departure - No data to display    Observation Course   The patient was found to have a psychiatric condition that would benefit from an observation stay in the emergency department for further psychiatric stabilization and/or coordination of a safe disposition. The plan upon observation admission included serial assessments of psychiatric condition, potential administration of medications if indicated, further disposition pending the patient's psychiatric course during the monitoring period.     Serial assessments of the patient's psychiatric condition were performed. Nursing notes were reviewed. During the observation period, the patient did not require medications for agitation, and did not require restraints/seclusion for patient and/or provider safety.     After a period " of working with the treatment team on the EmPATH unit, the patient's mental state improved to allow a safe transition to outpatient care. After counseling on the diagnosis, work-up, and treatment plan, the patient was discharged. Close follow-up with a psychiatrist and/or therapist was recommended and community psychiatric resources were provided. Patient is to return to the ED if any urgent or potentially life-threatening concerns.     Discharge Diagnoses:   Final diagnoses:   Hallucinations   Schizoaffective disorder, bipolar type (H)       Treatment Plan:  -Plan to discontinue oral Haldol 15 mg nightly and proceed with transitioning to Haldol decanoate, targeting a total monthly dose of 150 mg every 30 days.  His first dose of 100 mg will be administered today on 9/11/2024.  He should receive the remainder of his anticipated monthly dose, an additional 50 mg, in about 1 week.  Afterwards, Haldol decanoate may be continued every 30 days at 150 mg.  He will follow-up with outpatient providers to ensure adequate tolerability and therapeutic response.  -Continue Cogentin at the higher dose of 2 mg twice a day for EPS prevention.  As his tolerability to Haldol has been established in the upcoming weeks, he may work with his outpatient provider on gradually lowering the dose of Cogentin to minimize anticholinergic side effects.  -Continue Depakote ER 1250 mg daily for mood stabilization.  Recommend checking a serum level in about 2 weeks to ensure safe and therapeutic dosing  -Hydroxyzine 25 mg 3 times a day may be available as needed for reduction of anxiety  -Haldol 5 mg twice a day as needed may be available for management of breakthrough psychosis or mood instability until Haldol decanoate has demonstrated therapeutic effects.  -Referral for out patient psychiatric medication management  -Discharge home today.      At the time of discharge, the patient's acute suicide risk was determined to be low due to the  following factors: Reduction in the intensity of mood/anxiety symptoms that preceded the admission, denial of suicidal thoughts, denies feeling helpless or helpless, not currently under the influence of alcohol or illicit substances, denies experiencing command hallucinations, no immediate access to firearms. The patient's acute risk could be higher if noncompliant with their treatment plan, medications, follow-up appointments or using illicit substances or alcohol. Protective factors include: social supports    I spent more than 30 minutes on discharge day activities.    --  Garth Tapia MD  [facility] EMERGENCY DEPT  EmPATH Unit       Garth Tapia MD  09/11/24 [id number]
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
## 2024-09-11 NOTE — ED NOTES
Patient pleasant and cooperative.  Patient is making eye contact and smiling at times.  He does not seemed to be guarded today.  He is pacing on the unit.  He denies suicidal ideation and denies hallucinations.  He was talking to the volunteer with the dog and petting the dog.

## 2024-09-11 NOTE — ED NOTES
Patient pacing all day.  He just took hydroxyzine. He has been using nicorette. He took a shower.  He has been pleasant. Plan is to go to the hotel where his mother stays tonight when his mother goes home from work at 2330.  We will send him via cab around that time. Haldol Deconate given.  No side effects noted today.

## 2024-09-11 NOTE — ED NOTES
Pt is desiring to discharge and plans to go the hotel where is mother is staying. Pt did call mom and she was OK with pt getting a key to the hotel where she is staying. Pt plans on gettign a key and then going shopping around the hotel and wanting to meet with his mother. Pt denies hallucinations and denies any SI or HI. Pt is anxious to discharge as he wants to smoke. Discharge instructions reviewed with patient including follow-up care plan. Educated on medication regime and advised not to stop prescribed medication without consulting their physician. Reviewed safety plan and outpatient resources/appointments.Verbalized understanding of discharge instructions. All belongings which where brought into the hospital have been returned to patient. Escorted off the unit @ 7870. Discharged to self.

## 2024-09-12 ENCOUNTER — TELEPHONE (OUTPATIENT)
Dept: BEHAVIORAL HEALTH | Facility: CLINIC | Age: 29
End: 2024-09-12
Payer: MEDICARE

## 2024-09-12 NOTE — TELEPHONE ENCOUNTER
Spoke with PT about follow-up care. Offered additional appts/resources and patient declined. No further follow-up needed. EmPATH number provided if they would like additional assistance.

## (undated) RX ORDER — DIPHENHYDRAMINE HYDROCHLORIDE 50 MG/ML
INJECTION INTRAMUSCULAR; INTRAVENOUS
Status: DISPENSED
Start: 2019-03-02

## (undated) RX ORDER — HALOPERIDOL 5 MG/ML
INJECTION INTRAMUSCULAR
Status: DISPENSED
Start: 2019-03-02

## (undated) RX ORDER — LORAZEPAM 2 MG/ML
INJECTION INTRAMUSCULAR
Status: DISPENSED
Start: 2019-03-02